# Patient Record
Sex: MALE | Race: WHITE | Employment: OTHER | URBAN - NONMETROPOLITAN AREA
[De-identification: names, ages, dates, MRNs, and addresses within clinical notes are randomized per-mention and may not be internally consistent; named-entity substitution may affect disease eponyms.]

---

## 2017-01-24 ENCOUNTER — HOSPITAL ENCOUNTER (OUTPATIENT)
Dept: PAIN MANAGEMENT | Age: 62
Discharge: HOME | End: 2017-01-24
Payer: MEDICARE

## 2017-01-24 VITALS
WEIGHT: 171 LBS | BODY MASS INDEX: 28.49 KG/M2 | RESPIRATION RATE: 16 BRPM | SYSTOLIC BLOOD PRESSURE: 141 MMHG | TEMPERATURE: 97.7 F | HEART RATE: 75 BPM | OXYGEN SATURATION: 94 % | DIASTOLIC BLOOD PRESSURE: 82 MMHG | HEIGHT: 65 IN

## 2017-01-24 DIAGNOSIS — M47.816 LUMBAR FACET ARTHROPATHY: ICD-10-CM

## 2017-01-24 PROCEDURE — 99214 OFFICE O/P EST MOD 30 MIN: CPT

## 2017-01-24 RX ORDER — OXYCODONE HYDROCHLORIDE 15 MG/1
1 TABLET, FILM COATED, EXTENDED RELEASE ORAL EVERY 12 HOURS
Qty: 60 TABLET | Refills: 0 | Status: SHIPPED | OUTPATIENT
Start: 2017-01-29 | End: 2017-02-20 | Stop reason: SDUPTHER

## 2017-01-24 RX ORDER — OXYCODONE HYDROCHLORIDE 10 MG/1
10 TABLET ORAL 4 TIMES DAILY PRN
Qty: 120 TABLET | Refills: 0 | Status: SHIPPED | OUTPATIENT
Start: 2017-01-29 | End: 2017-02-20 | Stop reason: SDUPTHER

## 2017-01-24 ASSESSMENT — PAIN DESCRIPTION - DESCRIPTORS: DESCRIPTORS: ACHING;CONSTANT

## 2017-01-24 ASSESSMENT — PAIN SCALES - GENERAL: PAINLEVEL_OUTOF10: 5

## 2017-01-24 ASSESSMENT — PAIN DESCRIPTION - PROGRESSION: CLINICAL_PROGRESSION: NOT CHANGED

## 2017-01-24 ASSESSMENT — PAIN DESCRIPTION - FREQUENCY: FREQUENCY: CONTINUOUS

## 2017-01-24 ASSESSMENT — PAIN DESCRIPTION - ORIENTATION: ORIENTATION: RIGHT;LEFT;LOWER

## 2017-01-24 ASSESSMENT — PAIN DESCRIPTION - PAIN TYPE: TYPE: CHRONIC PAIN

## 2017-01-24 ASSESSMENT — ACTIVITIES OF DAILY LIVING (ADL): EFFECT OF PAIN ON DAILY ACTIVITIES: DAILY ACTIVITIES

## 2017-01-24 ASSESSMENT — PAIN DESCRIPTION - ONSET: ONSET: ON-GOING

## 2017-02-21 RX ORDER — OXYCODONE HYDROCHLORIDE 15 MG/1
1 TABLET, FILM COATED, EXTENDED RELEASE ORAL EVERY 12 HOURS
Qty: 60 TABLET | Refills: 0 | Status: SHIPPED | OUTPATIENT
Start: 2017-02-28 | End: 2017-03-06 | Stop reason: SDUPTHER

## 2017-02-21 RX ORDER — OXYCODONE HYDROCHLORIDE 10 MG/1
10 TABLET ORAL 4 TIMES DAILY PRN
Qty: 120 TABLET | Refills: 0 | Status: SHIPPED | OUTPATIENT
Start: 2017-02-28 | End: 2017-03-06 | Stop reason: SDUPTHER

## 2017-03-06 ENCOUNTER — HOSPITAL ENCOUNTER (OUTPATIENT)
Dept: PAIN MANAGEMENT | Age: 62
Discharge: HOME | End: 2017-03-06
Payer: MEDICARE

## 2017-03-06 VITALS
BODY MASS INDEX: 28.16 KG/M2 | SYSTOLIC BLOOD PRESSURE: 122 MMHG | HEART RATE: 82 BPM | HEIGHT: 65 IN | RESPIRATION RATE: 20 BRPM | DIASTOLIC BLOOD PRESSURE: 76 MMHG | OXYGEN SATURATION: 98 % | WEIGHT: 169 LBS | TEMPERATURE: 97.5 F

## 2017-03-06 DIAGNOSIS — M79.18 MYOFASCIAL PAIN SYNDROME: Chronic | ICD-10-CM

## 2017-03-06 DIAGNOSIS — M47.816 LUMBAR FACET ARTHROPATHY: ICD-10-CM

## 2017-03-06 DIAGNOSIS — G89.29 CHRONIC BILATERAL LOW BACK PAIN WITH RIGHT-SIDED SCIATICA: ICD-10-CM

## 2017-03-06 DIAGNOSIS — M54.41 CHRONIC BILATERAL LOW BACK PAIN WITH RIGHT-SIDED SCIATICA: ICD-10-CM

## 2017-03-06 PROCEDURE — 6360000002 HC RX W HCPCS

## 2017-03-06 PROCEDURE — 20553 NJX 1/MLT TRIGGER POINTS 3/>: CPT

## 2017-03-06 PROCEDURE — 2500000003 HC RX 250 WO HCPCS

## 2017-03-06 RX ORDER — OXYCODONE HYDROCHLORIDE 10 MG/1
10 TABLET ORAL 4 TIMES DAILY PRN
Qty: 120 TABLET | Refills: 0 | Status: SHIPPED | OUTPATIENT
Start: 2017-03-30 | End: 2017-04-12 | Stop reason: SDUPTHER

## 2017-03-06 RX ORDER — OXYCODONE HYDROCHLORIDE 15 MG/1
1 TABLET, FILM COATED, EXTENDED RELEASE ORAL EVERY 12 HOURS
Qty: 60 TABLET | Refills: 0 | Status: SHIPPED | OUTPATIENT
Start: 2017-03-30 | End: 2017-04-12 | Stop reason: SDUPTHER

## 2017-03-06 RX ORDER — TRIAMCINOLONE ACETONIDE 40 MG/ML
INJECTION, SUSPENSION INTRA-ARTICULAR; INTRAMUSCULAR
Status: COMPLETED | OUTPATIENT
Start: 2017-03-06 | End: 2017-03-06

## 2017-03-06 RX ORDER — BUPIVACAINE HYDROCHLORIDE 5 MG/ML
INJECTION, SOLUTION EPIDURAL; INTRACAUDAL
Status: COMPLETED | OUTPATIENT
Start: 2017-03-06 | End: 2017-03-06

## 2017-03-06 RX ADMIN — TRIAMCINOLONE ACETONIDE 30 MG: 40 INJECTION, SUSPENSION INTRA-ARTICULAR; INTRAMUSCULAR at 11:36

## 2017-03-06 RX ADMIN — BUPIVACAINE HYDROCHLORIDE 14 ML: 5 INJECTION, SOLUTION EPIDURAL; INTRACAUDAL at 11:32

## 2017-03-06 ASSESSMENT — PAIN DESCRIPTION - DESCRIPTORS: DESCRIPTORS: ACHING;CONSTANT;RADIATING

## 2017-03-06 ASSESSMENT — PAIN DESCRIPTION - LOCATION: LOCATION: BACK;NECK

## 2017-03-06 ASSESSMENT — PAIN DESCRIPTION - ONSET: ONSET: ON-GOING

## 2017-03-06 ASSESSMENT — PAIN DESCRIPTION - FREQUENCY: FREQUENCY: CONTINUOUS

## 2017-03-06 ASSESSMENT — PAIN DESCRIPTION - PROGRESSION: CLINICAL_PROGRESSION: NOT CHANGED

## 2017-03-06 ASSESSMENT — PAIN DESCRIPTION - PAIN TYPE: TYPE: CHRONIC PAIN

## 2017-04-13 RX ORDER — OXYCODONE HYDROCHLORIDE 10 MG/1
10 TABLET ORAL 4 TIMES DAILY PRN
Qty: 120 TABLET | Refills: 0 | Status: SHIPPED | OUTPATIENT
Start: 2017-04-29 | End: 2017-05-02 | Stop reason: SDUPTHER

## 2017-04-13 RX ORDER — OXYCODONE HYDROCHLORIDE 15 MG/1
1 TABLET, FILM COATED, EXTENDED RELEASE ORAL EVERY 12 HOURS
Qty: 60 TABLET | Refills: 0 | Status: SHIPPED | OUTPATIENT
Start: 2017-04-29 | End: 2017-05-02 | Stop reason: SDUPTHER

## 2017-05-02 ENCOUNTER — HOSPITAL ENCOUNTER (OUTPATIENT)
Dept: PAIN MANAGEMENT | Age: 62
Discharge: HOME | End: 2017-05-02
Payer: MEDICARE

## 2017-05-02 VITALS
HEART RATE: 69 BPM | OXYGEN SATURATION: 95 % | DIASTOLIC BLOOD PRESSURE: 74 MMHG | SYSTOLIC BLOOD PRESSURE: 112 MMHG | RESPIRATION RATE: 20 BRPM

## 2017-05-02 DIAGNOSIS — M54.59 LUMBAR FACET JOINT PAIN: ICD-10-CM

## 2017-05-02 DIAGNOSIS — M79.18 MYOFASCIAL PAIN SYNDROME: ICD-10-CM

## 2017-05-02 DIAGNOSIS — M47.816 LUMBAR FACET ARTHROPATHY: ICD-10-CM

## 2017-05-02 PROCEDURE — 6360000002 HC RX W HCPCS

## 2017-05-02 PROCEDURE — 2500000003 HC RX 250 WO HCPCS

## 2017-05-02 PROCEDURE — 64494 INJ PARAVERT F JNT L/S 2 LEV: CPT

## 2017-05-02 PROCEDURE — 64493 INJ PARAVERT F JNT L/S 1 LEV: CPT

## 2017-05-02 PROCEDURE — 3209999900 FLUORO FOR SURGICAL PROCEDURES

## 2017-05-02 PROCEDURE — 99152 MOD SED SAME PHYS/QHP 5/>YRS: CPT

## 2017-05-02 RX ORDER — BUPIVACAINE HYDROCHLORIDE 5 MG/ML
INJECTION, SOLUTION EPIDURAL; INTRACAUDAL
Status: COMPLETED | OUTPATIENT
Start: 2017-05-02 | End: 2017-05-02

## 2017-05-02 RX ORDER — TRIAMCINOLONE ACETONIDE 40 MG/ML
INJECTION, SUSPENSION INTRA-ARTICULAR; INTRAMUSCULAR
Status: COMPLETED | OUTPATIENT
Start: 2017-05-02 | End: 2017-05-02

## 2017-05-02 RX ORDER — MIDAZOLAM HYDROCHLORIDE 1 MG/ML
INJECTION INTRAMUSCULAR; INTRAVENOUS
Status: COMPLETED | OUTPATIENT
Start: 2017-05-02 | End: 2017-05-02

## 2017-05-02 RX ORDER — OXYCODONE AND ACETAMINOPHEN 10; 325 MG/1; MG/1
1 TABLET ORAL EVERY 4 HOURS PRN
COMMUNITY
Start: 2017-05-29 | End: 2017-06-20 | Stop reason: ALTCHOICE

## 2017-05-02 RX ORDER — OXYCODONE AND ACETAMINOPHEN 10; 325 MG/1; MG/1
TABLET ORAL
Qty: 150 TABLET | Refills: 0 | Status: SHIPPED | OUTPATIENT
Start: 2017-05-02 | End: 2017-06-20 | Stop reason: ALTCHOICE

## 2017-05-02 RX ORDER — OXYCODONE AND ACETAMINOPHEN 10; 325 MG/1; MG/1
1 TABLET ORAL EVERY 4 HOURS PRN
Qty: 150 TABLET | Refills: 0 | Status: SHIPPED | OUTPATIENT
Start: 2017-05-29 | End: 2017-06-20 | Stop reason: SDUPTHER

## 2017-05-02 RX ADMIN — MIDAZOLAM HYDROCHLORIDE 2 MG: 1 INJECTION INTRAMUSCULAR; INTRAVENOUS at 11:44

## 2017-05-02 RX ADMIN — BUPIVACAINE HYDROCHLORIDE 0.5 ML: 5 INJECTION, SOLUTION EPIDURAL; INTRACAUDAL at 11:52

## 2017-05-02 RX ADMIN — TRIAMCINOLONE ACETONIDE 2 MG: 40 INJECTION, SUSPENSION INTRA-ARTICULAR; INTRAMUSCULAR at 11:53

## 2017-06-09 ENCOUNTER — TELEPHONE (OUTPATIENT)
Dept: GASTROENTEROLOGY | Facility: CLINIC | Age: 62
End: 2017-06-09

## 2017-06-09 NOTE — TELEPHONE ENCOUNTER
Sent copy of lab orders and reminder letter to patient for Hepatitis C RNA, Quantitative, PCR order.

## 2017-06-20 ENCOUNTER — HOSPITAL ENCOUNTER (OUTPATIENT)
Dept: PAIN MANAGEMENT | Age: 62
Discharge: HOME | End: 2017-06-20
Payer: MEDICARE

## 2017-06-20 ENCOUNTER — HOSPITAL ENCOUNTER (OUTPATIENT)
Dept: GENERAL RADIOLOGY | Age: 62
Discharge: HOME | End: 2017-06-20
Payer: MEDICARE

## 2017-06-20 VITALS
BODY MASS INDEX: 27.32 KG/M2 | OXYGEN SATURATION: 99 % | DIASTOLIC BLOOD PRESSURE: 88 MMHG | RESPIRATION RATE: 18 BRPM | SYSTOLIC BLOOD PRESSURE: 152 MMHG | TEMPERATURE: 97.2 F | HEART RATE: 78 BPM | WEIGHT: 164 LBS | HEIGHT: 65 IN

## 2017-06-20 DIAGNOSIS — M79.18 MYOFASCIAL PAIN SYNDROME: ICD-10-CM

## 2017-06-20 DIAGNOSIS — M54.41 CHRONIC BILATERAL LOW BACK PAIN WITH RIGHT-SIDED SCIATICA: ICD-10-CM

## 2017-06-20 DIAGNOSIS — G89.29 CHRONIC BILATERAL LOW BACK PAIN WITH RIGHT-SIDED SCIATICA: ICD-10-CM

## 2017-06-20 DIAGNOSIS — M47.816 LUMBAR FACET ARTHROPATHY: ICD-10-CM

## 2017-06-20 PROCEDURE — 99213 OFFICE O/P EST LOW 20 MIN: CPT

## 2017-06-20 PROCEDURE — 72120 X-RAY BEND ONLY L-S SPINE: CPT

## 2017-06-20 PROCEDURE — G0463 HOSPITAL OUTPT CLINIC VISIT: HCPCS

## 2017-06-20 RX ORDER — OXYCODONE AND ACETAMINOPHEN 10; 325 MG/1; MG/1
1 TABLET ORAL EVERY 4 HOURS PRN
Qty: 150 TABLET | Refills: 0 | Status: SHIPPED | OUTPATIENT
Start: 2017-06-28 | End: 2017-07-19 | Stop reason: SDUPTHER

## 2017-06-20 ASSESSMENT — PAIN DESCRIPTION - PROGRESSION: CLINICAL_PROGRESSION: NOT CHANGED

## 2017-06-20 ASSESSMENT — PAIN DESCRIPTION - DESCRIPTORS: DESCRIPTORS: ACHING;CONSTANT;RADIATING

## 2017-06-20 ASSESSMENT — PAIN DESCRIPTION - ORIENTATION: ORIENTATION: LOWER

## 2017-06-20 ASSESSMENT — ACTIVITIES OF DAILY LIVING (ADL): EFFECT OF PAIN ON DAILY ACTIVITIES: LIMITS ACTIVITIES

## 2017-06-20 ASSESSMENT — PAIN DESCRIPTION - FREQUENCY: FREQUENCY: CONTINUOUS

## 2017-06-20 ASSESSMENT — PAIN SCALES - GENERAL: PAINLEVEL_OUTOF10: 5

## 2017-06-20 ASSESSMENT — PAIN DESCRIPTION - PAIN TYPE: TYPE: CHRONIC PAIN

## 2017-06-20 ASSESSMENT — PAIN DESCRIPTION - LOCATION: LOCATION: BACK

## 2017-06-20 ASSESSMENT — PAIN DESCRIPTION - ONSET: ONSET: ON-GOING

## 2017-07-07 ENCOUNTER — TELEPHONE (OUTPATIENT)
Dept: GASTROENTEROLOGY | Facility: CLINIC | Age: 62
End: 2017-07-07

## 2017-07-07 NOTE — TELEPHONE ENCOUNTER
"----- Message from Emma Mittal MA sent at 7/7/2017  8:52 AM CDT -----  Regarding: Cancellation of Order # [id number]  Order number [id number] for the procedure HEPATITIS C RNA,   QUANTITATIVE, PCR (GRAPH) [[id number]] has been canceled by Emma Mittal MA [[id number]]. This procedure was ordered by you on Nov 21, 2016 for the patient Cayetano Curry [[id number]]. The reason for  cancellation was \"Other\".    This was a future order.  "

## 2017-07-19 RX ORDER — OXYCODONE AND ACETAMINOPHEN 10; 325 MG/1; MG/1
1 TABLET ORAL EVERY 4 HOURS PRN
Qty: 150 TABLET | Refills: 0 | Status: SHIPPED | OUTPATIENT
Start: 2017-07-28 | End: 2017-08-03 | Stop reason: SDUPTHER

## 2017-08-03 ENCOUNTER — HOSPITAL ENCOUNTER (OUTPATIENT)
Dept: PAIN MANAGEMENT | Age: 62
Discharge: HOME | End: 2017-08-03
Payer: MEDICARE

## 2017-08-03 VITALS
WEIGHT: 164 LBS | RESPIRATION RATE: 20 BRPM | HEART RATE: 70 BPM | HEIGHT: 65 IN | OXYGEN SATURATION: 99 % | TEMPERATURE: 97.5 F | BODY MASS INDEX: 27.32 KG/M2 | DIASTOLIC BLOOD PRESSURE: 68 MMHG | SYSTOLIC BLOOD PRESSURE: 124 MMHG

## 2017-08-03 DIAGNOSIS — M47.816 LUMBAR FACET ARTHROPATHY: ICD-10-CM

## 2017-08-03 DIAGNOSIS — M54.59 LUMBAR FACET JOINT PAIN: ICD-10-CM

## 2017-08-03 DIAGNOSIS — M79.18 MYOFASCIAL PAIN SYNDROME: ICD-10-CM

## 2017-08-03 PROCEDURE — 99152 MOD SED SAME PHYS/QHP 5/>YRS: CPT

## 2017-08-03 PROCEDURE — 64636 DESTROY L/S FACET JNT ADDL: CPT

## 2017-08-03 PROCEDURE — 2500000003 HC RX 250 WO HCPCS

## 2017-08-03 PROCEDURE — 64635 DESTROY LUMB/SAC FACET JNT: CPT

## 2017-08-03 PROCEDURE — 80307 DRUG TEST PRSMV CHEM ANLYZR: CPT

## 2017-08-03 PROCEDURE — 3209999900 FLUORO FOR SURGICAL PROCEDURES

## 2017-08-03 PROCEDURE — 6360000002 HC RX W HCPCS

## 2017-08-03 RX ORDER — ROPINIROLE 0.25 MG/1
0.25 TABLET, FILM COATED ORAL NIGHTLY
COMMUNITY

## 2017-08-03 RX ORDER — OXYCODONE AND ACETAMINOPHEN 10; 325 MG/1; MG/1
1 TABLET ORAL EVERY 4 HOURS PRN
Qty: 150 TABLET | Refills: 0 | Status: SHIPPED | OUTPATIENT
Start: 2017-08-26 | End: 2017-09-21 | Stop reason: SDUPTHER

## 2017-08-03 RX ORDER — LIDOCAINE HYDROCHLORIDE 10 MG/ML
INJECTION, SOLUTION EPIDURAL; INFILTRATION; INTRACAUDAL; PERINEURAL
Status: COMPLETED | OUTPATIENT
Start: 2017-08-03 | End: 2017-08-03

## 2017-08-03 RX ORDER — BUPIVACAINE HYDROCHLORIDE 5 MG/ML
INJECTION, SOLUTION EPIDURAL; INTRACAUDAL
Status: COMPLETED | OUTPATIENT
Start: 2017-08-03 | End: 2017-08-03

## 2017-08-03 RX ORDER — TRIAMCINOLONE ACETONIDE 40 MG/ML
INJECTION, SUSPENSION INTRA-ARTICULAR; INTRAMUSCULAR
Status: COMPLETED | OUTPATIENT
Start: 2017-08-03 | End: 2017-08-03

## 2017-08-03 RX ORDER — LIDOCAINE HYDROCHLORIDE 20 MG/ML
INJECTION, SOLUTION EPIDURAL; INFILTRATION; INTRACAUDAL; PERINEURAL
Status: COMPLETED | OUTPATIENT
Start: 2017-08-03 | End: 2017-08-03

## 2017-08-03 RX ORDER — MIDAZOLAM HYDROCHLORIDE 1 MG/ML
INJECTION INTRAMUSCULAR; INTRAVENOUS
Status: COMPLETED | OUTPATIENT
Start: 2017-08-03 | End: 2017-08-03

## 2017-08-03 RX ADMIN — MIDAZOLAM HYDROCHLORIDE 2 MG: 1 INJECTION INTRAMUSCULAR; INTRAVENOUS at 09:53

## 2017-08-03 RX ADMIN — LIDOCAINE HYDROCHLORIDE 2 ML: 20 INJECTION, SOLUTION EPIDURAL; INFILTRATION; INTRACAUDAL; PERINEURAL at 10:09

## 2017-08-03 RX ADMIN — LIDOCAINE HYDROCHLORIDE 3 ML: 10 INJECTION, SOLUTION EPIDURAL; INFILTRATION; INTRACAUDAL; PERINEURAL at 09:58

## 2017-08-03 RX ADMIN — TRIAMCINOLONE ACETONIDE 8 MG: 40 INJECTION, SUSPENSION INTRA-ARTICULAR; INTRAMUSCULAR at 10:10

## 2017-08-03 RX ADMIN — BUPIVACAINE HYDROCHLORIDE 1 ML: 5 INJECTION, SOLUTION EPIDURAL; INTRACAUDAL at 10:09

## 2017-08-03 ASSESSMENT — PAIN DESCRIPTION - DESCRIPTORS: DESCRIPTORS: ACHING;CONSTANT;THROBBING;RADIATING;NUMBNESS

## 2017-08-03 ASSESSMENT — PAIN - FUNCTIONAL ASSESSMENT
PAIN_FUNCTIONAL_ASSESSMENT: 0-10
PAIN_FUNCTIONAL_ASSESSMENT: 0-10

## 2017-08-03 ASSESSMENT — ACTIVITIES OF DAILY LIVING (ADL): EFFECT OF PAIN ON DAILY ACTIVITIES: DAILY CHORES AND ACTIVITIES

## 2017-08-07 LAB
AMPHETAMINES, URINE: NEGATIVE NG/ML
BARBITURATES, URINE: NEGATIVE NG/ML
BENZODIAZEPINES, URINE: NEGATIVE NG/ML
CANNABINOIDS, URINE: NEGATIVE NG/ML
COCAINE METABOLITE, URINE: NEGATIVE NG/ML
CREATININE, URINE: 21.8 MG/DL (ref 20–300)
ETHANOL U, QUAN: NEGATIVE %
FENTANYL URINE: NEGATIVE PG/ML
MEPERIDINE, UR: NEGATIVE NG/ML
METHADONE SCREEN, URINE: NEGATIVE NG/ML
OPIATES, URINE: NEGATIVE NG/ML
OXYCODONE URINE: POSITIVE
OXYCODONE, UR GC/MS: 585 NG/ML
OXYCODONE/OXYMORPH, UR: POSITIVE
OXYCODONE/OXYMORPHONE, UR: ABNORMAL NG/ML
OXYMORPHONE, URINE: NEGATIVE
PH, URINE: 6.5 (ref 4.5–8.9)
PHENCYCLIDINE, URINE: NEGATIVE NG/ML
PROPOXYPHENE, URINE: NEGATIVE NG/ML

## 2017-09-22 RX ORDER — OXYCODONE AND ACETAMINOPHEN 10; 325 MG/1; MG/1
1 TABLET ORAL 4 TIMES DAILY PRN
Qty: 120 TABLET | Refills: 0 | Status: SHIPPED | OUTPATIENT
Start: 2017-09-25 | End: 2017-10-23 | Stop reason: SDUPTHER

## 2017-10-24 RX ORDER — OXYCODONE AND ACETAMINOPHEN 10; 325 MG/1; MG/1
1 TABLET ORAL 4 TIMES DAILY PRN
Qty: 120 TABLET | Refills: 0 | Status: SHIPPED | OUTPATIENT
Start: 2017-10-25 | End: 2017-11-03 | Stop reason: SDUPTHER

## 2017-11-03 ENCOUNTER — HOSPITAL ENCOUNTER (OUTPATIENT)
Dept: PAIN MANAGEMENT | Age: 62
Discharge: HOME | End: 2017-11-03
Payer: MEDICARE

## 2017-11-03 ENCOUNTER — HOSPITAL ENCOUNTER (OUTPATIENT)
Dept: GENERAL RADIOLOGY | Age: 62
Discharge: HOME | End: 2017-11-03
Payer: MEDICARE

## 2017-11-03 VITALS
WEIGHT: 157 LBS | BODY MASS INDEX: 26.16 KG/M2 | SYSTOLIC BLOOD PRESSURE: 120 MMHG | HEART RATE: 68 BPM | TEMPERATURE: 97.5 F | RESPIRATION RATE: 18 BRPM | DIASTOLIC BLOOD PRESSURE: 80 MMHG | OXYGEN SATURATION: 97 % | HEIGHT: 65 IN

## 2017-11-03 DIAGNOSIS — M25.552 PAIN OF LEFT HIP JOINT: ICD-10-CM

## 2017-11-03 DIAGNOSIS — M47.816 LUMBAR FACET ARTHROPATHY: ICD-10-CM

## 2017-11-03 DIAGNOSIS — M79.18 MYOFASCIAL PAIN SYNDROME: ICD-10-CM

## 2017-11-03 DIAGNOSIS — M25.552 PAIN OF LEFT HIP JOINT: Primary | ICD-10-CM

## 2017-11-03 DIAGNOSIS — G89.29 CHRONIC BILATERAL LOW BACK PAIN WITH RIGHT-SIDED SCIATICA: ICD-10-CM

## 2017-11-03 DIAGNOSIS — M54.41 CHRONIC BILATERAL LOW BACK PAIN WITH RIGHT-SIDED SCIATICA: ICD-10-CM

## 2017-11-03 PROCEDURE — 99213 OFFICE O/P EST LOW 20 MIN: CPT

## 2017-11-03 PROCEDURE — 73502 X-RAY EXAM HIP UNI 2-3 VIEWS: CPT

## 2017-11-03 RX ORDER — OXYCODONE AND ACETAMINOPHEN 10; 325 MG/1; MG/1
1 TABLET ORAL 4 TIMES DAILY PRN
Qty: 120 TABLET | Refills: 0 | Status: SHIPPED | OUTPATIENT
Start: 2017-11-24 | End: 2017-12-12 | Stop reason: SDUPTHER

## 2017-11-03 ASSESSMENT — PAIN SCALES - GENERAL: PAINLEVEL_OUTOF10: 6

## 2017-11-03 ASSESSMENT — PAIN DESCRIPTION - PROGRESSION: CLINICAL_PROGRESSION: NOT CHANGED

## 2017-11-03 ASSESSMENT — PAIN DESCRIPTION - PAIN TYPE: TYPE: CHRONIC PAIN

## 2017-11-03 ASSESSMENT — PAIN DESCRIPTION - DIRECTION: RADIATING_TOWARDS: INTO BILATERAL HIPS

## 2017-11-03 ASSESSMENT — ACTIVITIES OF DAILY LIVING (ADL): EFFECT OF PAIN ON DAILY ACTIVITIES: LIMITS ACTIVITY

## 2017-11-03 ASSESSMENT — PAIN DESCRIPTION - ONSET: ONSET: ON-GOING

## 2017-11-03 ASSESSMENT — PAIN DESCRIPTION - ORIENTATION: ORIENTATION: LOWER

## 2017-11-03 ASSESSMENT — PAIN DESCRIPTION - FREQUENCY: FREQUENCY: CONTINUOUS

## 2017-11-03 ASSESSMENT — PAIN DESCRIPTION - LOCATION: LOCATION: BACK

## 2017-11-03 ASSESSMENT — PAIN DESCRIPTION - DESCRIPTORS: DESCRIPTORS: ACHING;CONSTANT;RADIATING;THROBBING;NUMBNESS

## 2017-11-03 NOTE — PROGRESS NOTES
Marisela Yates/Franc  Patient Pain Assessment  Progress Note       Chief Complaint   Patient presents with     Left Hip Pain     Neck/Upper Back Pain    Lower Back Pain     Pain Assessment  Pain Assessment: 0-10  Pain Level: 6  Pain Type: Chronic pain  Pain Location: Back, Neck, Hip  Pain Orientation: Lower, Upper, Left  Pain Descriptors: Aching, Constant, Radiating, Throbbing, Numbness  Pain Frequency: Continuous  Pain Onset: On-going  Clinical Progression: Not changed  Effect of Pain on Daily Activities: limits activity       [x]  Issues of Concern:    Reports he does not want to r/s another RFL currently, says it only lasted 2 weeks. Discussed other options related to current pain. Patient reports he has gotten good pain relief with previous trigger points to upper and lower back, neck region. Reporting pain in left hip and thinks it relates to limping due to limited ROM of left knee since prosthesis placement, reports he will be seeing Dr. Hodan Anders regarding his knee soon. Discussed injection for left hip pain as well and he is agreeable to this plan.       [x]  Reports current medication is helping, but continues to have on-going pain    [x]  Reports current pain medication increases ability to do activities of daily living     [x]  Current narcotic medications    [x]  Discussed possible medication side effects, risk of tolerance and/or dependence, alternative treatments    [x]  Encouraged to set goals of decreasing daily narcotic intake    [x]  Discussed effects of long term narcotic use      [x]  Injection options discussed (see above note)     []Yes [x]No  Current medication side effects, comment if applicable:      []Yes [x]No   Acute bladder or bowel changes    Previous Procedure / Percentage of pain control / Imaging / PT History:  Percentage of Pain Relief after Last Procedure: 40 %  How long lasted: 2 weeks     Radiology exams received during the last 12 months: XR Lumbar Spine  When 6/2017

## 2017-12-13 RX ORDER — OXYCODONE AND ACETAMINOPHEN 10; 325 MG/1; MG/1
1 TABLET ORAL 4 TIMES DAILY PRN
Qty: 120 TABLET | Refills: 0 | Status: SHIPPED | OUTPATIENT
Start: 2017-12-24 | End: 2018-01-17 | Stop reason: SDUPTHER

## 2018-01-18 RX ORDER — OXYCODONE AND ACETAMINOPHEN 10; 325 MG/1; MG/1
1 TABLET ORAL 4 TIMES DAILY PRN
Qty: 120 TABLET | Refills: 0 | Status: SHIPPED | OUTPATIENT
Start: 2018-01-23 | End: 2018-02-02 | Stop reason: SDUPTHER

## 2018-02-02 ENCOUNTER — HOSPITAL ENCOUNTER (OUTPATIENT)
Dept: PAIN MANAGEMENT | Age: 63
Discharge: HOME | End: 2018-02-02
Payer: MEDICARE

## 2018-02-02 VITALS
HEIGHT: 66 IN | OXYGEN SATURATION: 100 % | DIASTOLIC BLOOD PRESSURE: 61 MMHG | WEIGHT: 162 LBS | SYSTOLIC BLOOD PRESSURE: 126 MMHG | TEMPERATURE: 97.9 F | HEART RATE: 65 BPM | BODY MASS INDEX: 26.03 KG/M2 | RESPIRATION RATE: 20 BRPM

## 2018-02-02 DIAGNOSIS — M25.552 LEFT HIP PAIN: ICD-10-CM

## 2018-02-02 DIAGNOSIS — M47.816 LUMBAR FACET ARTHROPATHY: ICD-10-CM

## 2018-02-02 DIAGNOSIS — M79.18 MYOFASCIAL PAIN SYNDROME: ICD-10-CM

## 2018-02-02 DIAGNOSIS — G89.29 CHRONIC BILATERAL LOW BACK PAIN WITH RIGHT-SIDED SCIATICA: ICD-10-CM

## 2018-02-02 DIAGNOSIS — M79.18 MYOFASCIAL PAIN: Chronic | ICD-10-CM

## 2018-02-02 DIAGNOSIS — M54.41 CHRONIC BILATERAL LOW BACK PAIN WITH RIGHT-SIDED SCIATICA: ICD-10-CM

## 2018-02-02 PROCEDURE — 20553 NJX 1/MLT TRIGGER POINTS 3/>: CPT

## 2018-02-02 PROCEDURE — 20610 DRAIN/INJ JOINT/BURSA W/O US: CPT

## 2018-02-02 PROCEDURE — 3209999900 FLUORO FOR SURGICAL PROCEDURES

## 2018-02-02 PROCEDURE — 6360000002 HC RX W HCPCS

## 2018-02-02 PROCEDURE — 2500000003 HC RX 250 WO HCPCS

## 2018-02-02 RX ORDER — TRIAMCINOLONE ACETONIDE 40 MG/ML
INJECTION, SUSPENSION INTRA-ARTICULAR; INTRAMUSCULAR
Status: COMPLETED | OUTPATIENT
Start: 2018-02-02 | End: 2018-02-02

## 2018-02-02 RX ORDER — OXYCODONE AND ACETAMINOPHEN 10; 325 MG/1; MG/1
1 TABLET ORAL 4 TIMES DAILY PRN
Qty: 120 TABLET | Refills: 0 | Status: SHIPPED | OUTPATIENT
Start: 2018-02-22 | End: 2018-03-16 | Stop reason: SDUPTHER

## 2018-02-02 RX ORDER — BUPIVACAINE HYDROCHLORIDE 5 MG/ML
INJECTION, SOLUTION EPIDURAL; INTRACAUDAL
Status: COMPLETED | OUTPATIENT
Start: 2018-02-02 | End: 2018-02-02

## 2018-02-02 RX ADMIN — TRIAMCINOLONE ACETONIDE 30 MG: 40 INJECTION, SUSPENSION INTRA-ARTICULAR; INTRAMUSCULAR at 11:21

## 2018-02-02 RX ADMIN — BUPIVACAINE HYDROCHLORIDE 9 ML: 5 INJECTION, SOLUTION EPIDURAL; INTRACAUDAL at 11:16

## 2018-02-02 RX ADMIN — BUPIVACAINE HYDROCHLORIDE 15 ML: 5 INJECTION, SOLUTION EPIDURAL; INTRACAUDAL at 11:21

## 2018-02-02 RX ADMIN — TRIAMCINOLONE ACETONIDE 40 MG: 40 INJECTION, SUSPENSION INTRA-ARTICULAR; INTRAMUSCULAR at 11:16

## 2018-02-02 ASSESSMENT — PAIN DESCRIPTION - DESCRIPTORS: DESCRIPTORS: ACHING;CONSTANT;RADIATING;THROBBING

## 2018-02-02 ASSESSMENT — PAIN - FUNCTIONAL ASSESSMENT: PAIN_FUNCTIONAL_ASSESSMENT: 0-10

## 2018-02-02 ASSESSMENT — ACTIVITIES OF DAILY LIVING (ADL): EFFECT OF PAIN ON DAILY ACTIVITIES: LIMITS ACTIVITY

## 2018-02-02 NOTE — PROCEDURES
[]                   [] Over 50% of today's appointment was given to discussion, evaluation and counseling. [] Over 50%of today's appointment was given to discussion, evaluation and counseling.

## 2018-02-02 NOTE — PROGRESS NOTES
the last 12 months: Yes    Education Provided:  [x] Review of [employer]  [x] Agreement Review  [x] Compliance Issues Discussed    [] Cognitive Behavior Needs [x] Exercise [] Review of Test [] Financial Issues  [] Tobacco/Alcohol Use [x] Teaching [] New Patient [] Picture Obtained    Physician Plan:  [] Outgoing Referral  [] Pharmacy Consult  [] Test Ordered   [] Obtained Test Results / Consult Notes  [] UDS due at next visit, verified per EPIC      [] Suspected Physical Abuse or Suicide Risk assessed - IF YES COMPLETE QUESTIONS BELOW    If any of the following questions are answered yes - contact attending physician for referral:    Has been considering harming self to escape stress, pain problems? [] YES  [x] NO  Has a suicide plan? [] YES  [x] NO  Has attempted suicide in the past?   [] YES  [x] NO  Has a close friend or family member who committed suicide? [] YES  [x] NO    Patient Referred To :      Additional Notes:    Assessment Completed by:  Electronically signed by Enoch Parada RN on 2/2/2018 at 10:39 AM

## 2018-03-19 RX ORDER — OXYCODONE AND ACETAMINOPHEN 10; 325 MG/1; MG/1
1 TABLET ORAL 4 TIMES DAILY PRN
Qty: 120 TABLET | Refills: 0 | Status: SHIPPED | OUTPATIENT
Start: 2018-03-24 | End: 2018-04-17 | Stop reason: SDUPTHER

## 2018-04-05 ENCOUNTER — HOSPITAL ENCOUNTER (OUTPATIENT)
Dept: GENERAL RADIOLOGY | Age: 63
Discharge: HOME | End: 2018-04-05
Payer: MEDICARE

## 2018-04-05 ENCOUNTER — HOSPITAL ENCOUNTER (OUTPATIENT)
Dept: PREADMISSION TESTING | Age: 63
Discharge: HOME | End: 2018-04-09
Payer: MEDICARE

## 2018-04-05 VITALS — HEIGHT: 65 IN | BODY MASS INDEX: 27.82 KG/M2 | WEIGHT: 167 LBS

## 2018-04-05 LAB
ALBUMIN SERPL-MCNC: 4.5 G/DL (ref 3.5–5.2)
ALP BLD-CCNC: 84 U/L (ref 40–130)
ALT SERPL-CCNC: 21 U/L (ref 5–41)
ANION GAP SERPL CALCULATED.3IONS-SCNC: 14 MMOL/L (ref 7–19)
APTT: 31.8 SEC (ref 26–36.2)
AST SERPL-CCNC: 29 U/L (ref 5–40)
BASOPHILS ABSOLUTE: 0.1 K/UL (ref 0–0.2)
BASOPHILS RELATIVE PERCENT: 1 % (ref 0–1)
BILIRUB SERPL-MCNC: 0.5 MG/DL (ref 0.2–1.2)
BILIRUBIN URINE: NEGATIVE
BLOOD, URINE: NEGATIVE
BUN BLDV-MCNC: 11 MG/DL (ref 8–23)
C-REACTIVE PROTEIN: 0.24 MG/DL (ref 0–0.5)
CALCIUM SERPL-MCNC: 9.7 MG/DL (ref 8.8–10.2)
CHLORIDE BLD-SCNC: 101 MMOL/L (ref 98–111)
CLARITY: CLEAR
CO2: 28 MMOL/L (ref 22–29)
COLOR: YELLOW
CREAT SERPL-MCNC: 1 MG/DL (ref 0.5–1.2)
EOSINOPHILS ABSOLUTE: 0.4 K/UL (ref 0–0.6)
EOSINOPHILS RELATIVE PERCENT: 6 % (ref 0–5)
GFR NON-AFRICAN AMERICAN: >60
GLUCOSE BLD-MCNC: 98 MG/DL (ref 74–109)
GLUCOSE URINE: NEGATIVE MG/DL
HCT VFR BLD CALC: 46.4 % (ref 42–52)
HEMOGLOBIN: 15.2 G/DL (ref 14–18)
INR BLD: 0.97 (ref 0.88–1.18)
KETONES, URINE: NEGATIVE MG/DL
LEUKOCYTE ESTERASE, URINE: NEGATIVE
LYMPHOCYTES ABSOLUTE: 2.5 K/UL (ref 1.1–4.5)
LYMPHOCYTES RELATIVE PERCENT: 34.2 % (ref 20–40)
MCH RBC QN AUTO: 30.3 PG (ref 27–31)
MCHC RBC AUTO-ENTMCNC: 32.8 G/DL (ref 33–37)
MCV RBC AUTO: 92.4 FL (ref 80–94)
MONOCYTES ABSOLUTE: 0.7 K/UL (ref 0–0.9)
MONOCYTES RELATIVE PERCENT: 9.5 % (ref 0–10)
NEUTROPHILS ABSOLUTE: 3.6 K/UL (ref 1.5–7.5)
NEUTROPHILS RELATIVE PERCENT: 49 % (ref 50–65)
NITRITE, URINE: NEGATIVE
PDW BLD-RTO: 13.2 % (ref 11.5–14.5)
PH UA: 7
PLATELET # BLD: 260 K/UL (ref 130–400)
PMV BLD AUTO: 9.9 FL (ref 9.4–12.4)
POTASSIUM SERPL-SCNC: 4.4 MMOL/L (ref 3.5–5)
PROTEIN UA: NEGATIVE MG/DL
PROTHROMBIN TIME: 12.8 SEC (ref 12–14.6)
RBC # BLD: 5.02 M/UL (ref 4.7–6.1)
SEDIMENTATION RATE, ERYTHROCYTE: 2 MM/HR (ref 0–15)
SODIUM BLD-SCNC: 143 MMOL/L (ref 136–145)
SPECIFIC GRAVITY UA: 1.02
TOTAL PROTEIN: 7.1 G/DL (ref 6.6–8.7)
URINE REFLEX TO CULTURE: NORMAL
UROBILINOGEN, URINE: 1 E.U./DL
WBC # BLD: 7.3 K/UL (ref 4.8–10.8)

## 2018-04-05 PROCEDURE — 93005 ELECTROCARDIOGRAM TRACING: CPT

## 2018-04-05 PROCEDURE — 71046 X-RAY EXAM CHEST 2 VIEWS: CPT

## 2018-04-05 PROCEDURE — 85730 THROMBOPLASTIN TIME PARTIAL: CPT

## 2018-04-05 PROCEDURE — 85025 COMPLETE CBC W/AUTO DIFF WBC: CPT

## 2018-04-05 PROCEDURE — 85610 PROTHROMBIN TIME: CPT

## 2018-04-05 PROCEDURE — 81003 URINALYSIS AUTO W/O SCOPE: CPT

## 2018-04-05 PROCEDURE — 85652 RBC SED RATE AUTOMATED: CPT

## 2018-04-05 PROCEDURE — 86140 C-REACTIVE PROTEIN: CPT

## 2018-04-05 PROCEDURE — 87070 CULTURE OTHR SPECIMN AEROBIC: CPT

## 2018-04-05 PROCEDURE — 80053 COMPREHEN METABOLIC PANEL: CPT

## 2018-04-05 RX ORDER — ACETAMINOPHEN 500 MG
1000 TABLET ORAL ONCE
Status: CANCELLED | OUTPATIENT
Start: 2018-04-18

## 2018-04-05 RX ORDER — VANCOMYCIN HYDROCHLORIDE 1 G/200ML
1000 INJECTION, SOLUTION INTRAVENOUS ONCE
Status: CANCELLED | OUTPATIENT
Start: 2018-04-18

## 2018-04-05 RX ORDER — OXYCODONE HCL 10 MG/1
10 TABLET, FILM COATED, EXTENDED RELEASE ORAL ONCE
Status: CANCELLED | OUTPATIENT
Start: 2018-04-18

## 2018-04-05 RX ORDER — CELECOXIB 200 MG/1
200 CAPSULE ORAL ONCE
Status: CANCELLED | OUTPATIENT
Start: 2018-04-18

## 2018-04-05 RX ORDER — DEXAMETHASONE SODIUM PHOSPHATE 10 MG/ML
10 INJECTION, SOLUTION INTRAMUSCULAR; INTRAVENOUS ONCE
Status: CANCELLED | OUTPATIENT
Start: 2018-04-18

## 2018-04-05 RX ORDER — PREGABALIN 75 MG/1
75 CAPSULE ORAL ONCE
Status: CANCELLED | OUTPATIENT
Start: 2018-04-18

## 2018-04-06 LAB
EKG P AXIS: 78 DEGREES
EKG P-R INTERVAL: 148 MS
EKG Q-T INTERVAL: 394 MS
EKG QRS DURATION: 78 MS
EKG QTC CALCULATION (BAZETT): 408 MS
EKG T AXIS: 67 DEGREES
MRSA CULTURE ONLY: NORMAL

## 2018-04-12 ENCOUNTER — HOSPITAL ENCOUNTER (OUTPATIENT)
Dept: PAIN MANAGEMENT | Age: 63
Discharge: HOME | End: 2018-04-12
Payer: MEDICARE

## 2018-04-12 VITALS
SYSTOLIC BLOOD PRESSURE: 128 MMHG | BODY MASS INDEX: 26.16 KG/M2 | TEMPERATURE: 98 F | DIASTOLIC BLOOD PRESSURE: 79 MMHG | WEIGHT: 157 LBS | HEIGHT: 65 IN | OXYGEN SATURATION: 97 % | RESPIRATION RATE: 18 BRPM | HEART RATE: 69 BPM

## 2018-04-12 DIAGNOSIS — M54.41 CHRONIC BILATERAL LOW BACK PAIN WITH RIGHT-SIDED SCIATICA: ICD-10-CM

## 2018-04-12 DIAGNOSIS — M79.18 MYOFASCIAL PAIN: ICD-10-CM

## 2018-04-12 DIAGNOSIS — M47.816 LUMBAR FACET ARTHROPATHY: ICD-10-CM

## 2018-04-12 DIAGNOSIS — M79.18 MYOFASCIAL PAIN SYNDROME: ICD-10-CM

## 2018-04-12 DIAGNOSIS — G89.29 CHRONIC BILATERAL LOW BACK PAIN WITH RIGHT-SIDED SCIATICA: ICD-10-CM

## 2018-04-12 PROCEDURE — 99213 OFFICE O/P EST LOW 20 MIN: CPT

## 2018-04-12 RX ORDER — OXYCODONE AND ACETAMINOPHEN 10; 325 MG/1; MG/1
1 TABLET ORAL 4 TIMES DAILY PRN
Qty: 60 TABLET | Refills: 0 | Status: SHIPPED | OUTPATIENT
Start: 2018-04-18 | End: 2018-04-25

## 2018-04-12 ASSESSMENT — PAIN DESCRIPTION - ORIENTATION: ORIENTATION: LEFT

## 2018-04-12 ASSESSMENT — PAIN DESCRIPTION - PROGRESSION: CLINICAL_PROGRESSION: NOT CHANGED

## 2018-04-12 ASSESSMENT — PAIN DESCRIPTION - DESCRIPTORS: DESCRIPTORS: ACHING;CONSTANT;RADIATING;THROBBING

## 2018-04-12 ASSESSMENT — PAIN DESCRIPTION - DIRECTION: RADIATING_TOWARDS: LEFT HIP

## 2018-04-12 ASSESSMENT — PAIN DESCRIPTION - LOCATION: LOCATION: BACK;NECK;HIP

## 2018-04-12 ASSESSMENT — PAIN SCALES - GENERAL: PAINLEVEL_OUTOF10: 6

## 2018-04-12 ASSESSMENT — PAIN DESCRIPTION - PAIN TYPE: TYPE: CHRONIC PAIN

## 2018-04-12 ASSESSMENT — PAIN DESCRIPTION - ONSET: ONSET: ON-GOING

## 2018-04-12 ASSESSMENT — ACTIVITIES OF DAILY LIVING (ADL): EFFECT OF PAIN ON DAILY ACTIVITIES: LIMITS ACTIVITY

## 2018-04-12 ASSESSMENT — PAIN DESCRIPTION - FREQUENCY: FREQUENCY: CONTINUOUS

## 2018-04-18 ENCOUNTER — ANESTHESIA EVENT (OUTPATIENT)
Dept: OPERATING ROOM | Age: 63
DRG: 467 | End: 2018-04-18
Payer: MEDICARE

## 2018-04-18 ENCOUNTER — HOSPITAL ENCOUNTER (INPATIENT)
Age: 63
LOS: 5 days | Discharge: HOME HEALTH | DRG: 467 | End: 2018-04-23
Attending: ORTHOPAEDIC SURGERY | Admitting: ORTHOPAEDIC SURGERY
Payer: MEDICARE

## 2018-04-18 ENCOUNTER — APPOINTMENT (OUTPATIENT)
Dept: GENERAL RADIOLOGY | Age: 63
DRG: 467 | End: 2018-04-18
Attending: ORTHOPAEDIC SURGERY
Payer: MEDICARE

## 2018-04-18 ENCOUNTER — ANESTHESIA (OUTPATIENT)
Dept: OPERATING ROOM | Age: 63
DRG: 467 | End: 2018-04-18
Payer: MEDICARE

## 2018-04-18 VITALS
DIASTOLIC BLOOD PRESSURE: 67 MMHG | TEMPERATURE: 98.4 F | RESPIRATION RATE: 28 BRPM | SYSTOLIC BLOOD PRESSURE: 132 MMHG | OXYGEN SATURATION: 100 %

## 2018-04-18 PROBLEM — M76.892: Status: ACTIVE | Noted: 2018-04-18

## 2018-04-18 LAB
ABO/RH: NORMAL
ANTIBODY SCREEN: NORMAL

## 2018-04-18 PROCEDURE — 2580000003 HC RX 258: Performed by: ORTHOPAEDIC SURGERY

## 2018-04-18 PROCEDURE — 36415 COLL VENOUS BLD VENIPUNCTURE: CPT

## 2018-04-18 PROCEDURE — 6370000000 HC RX 637 (ALT 250 FOR IP): Performed by: ORTHOPAEDIC SURGERY

## 2018-04-18 PROCEDURE — 2500000003 HC RX 250 WO HCPCS: Performed by: NURSE ANESTHETIST, CERTIFIED REGISTERED

## 2018-04-18 PROCEDURE — 2580000003 HC RX 258: Performed by: NURSE ANESTHETIST, CERTIFIED REGISTERED

## 2018-04-18 PROCEDURE — 86901 BLOOD TYPING SEROLOGIC RH(D): CPT

## 2018-04-18 PROCEDURE — 7100000000 HC PACU RECOVERY - FIRST 15 MIN: Performed by: ORTHOPAEDIC SURGERY

## 2018-04-18 PROCEDURE — 87205 SMEAR GRAM STAIN: CPT

## 2018-04-18 PROCEDURE — 0SPD0JZ REMOVAL OF SYNTHETIC SUBSTITUTE FROM LEFT KNEE JOINT, OPEN APPROACH: ICD-10-PCS | Performed by: ORTHOPAEDIC SURGERY

## 2018-04-18 PROCEDURE — 87070 CULTURE OTHR SPECIMN AEROBIC: CPT

## 2018-04-18 PROCEDURE — 6360000002 HC RX W HCPCS: Performed by: NURSE ANESTHETIST, CERTIFIED REGISTERED

## 2018-04-18 PROCEDURE — 87176 TISSUE HOMOGENIZATION CULTR: CPT

## 2018-04-18 PROCEDURE — 6360000002 HC RX W HCPCS: Performed by: ORTHOPAEDIC SURGERY

## 2018-04-18 PROCEDURE — 3600000015 HC SURGERY LEVEL 5 ADDTL 15MIN: Performed by: ORTHOPAEDIC SURGERY

## 2018-04-18 PROCEDURE — 3700000001 HC ADD 15 MINUTES (ANESTHESIA): Performed by: ORTHOPAEDIC SURGERY

## 2018-04-18 PROCEDURE — 2720000000 HC MISC SURG SUPPLY STERILE $0-50: Performed by: ORTHOPAEDIC SURGERY

## 2018-04-18 PROCEDURE — 6360000002 HC RX W HCPCS: Performed by: ANESTHESIOLOGY

## 2018-04-18 PROCEDURE — C9290 INJ, BUPIVACAINE LIPOSOME: HCPCS | Performed by: ORTHOPAEDIC SURGERY

## 2018-04-18 PROCEDURE — 0SRD0J9 REPLACEMENT OF LEFT KNEE JOINT WITH SYNTHETIC SUBSTITUTE, CEMENTED, OPEN APPROACH: ICD-10-PCS | Performed by: ORTHOPAEDIC SURGERY

## 2018-04-18 PROCEDURE — 2780000010 HC IMPLANT OTHER: Performed by: ORTHOPAEDIC SURGERY

## 2018-04-18 PROCEDURE — 64447 NJX AA&/STRD FEMORAL NRV IMG: CPT | Performed by: NURSE ANESTHETIST, CERTIFIED REGISTERED

## 2018-04-18 PROCEDURE — 3209999900 FLUORO FOR SURGICAL PROCEDURES

## 2018-04-18 PROCEDURE — 86900 BLOOD TYPING SEROLOGIC ABO: CPT

## 2018-04-18 PROCEDURE — 1210000000 HC MED SURG R&B

## 2018-04-18 PROCEDURE — 86850 RBC ANTIBODY SCREEN: CPT

## 2018-04-18 PROCEDURE — 2580000003 HC RX 258: Performed by: ANESTHESIOLOGY

## 2018-04-18 PROCEDURE — C1776 JOINT DEVICE (IMPLANTABLE): HCPCS | Performed by: ORTHOPAEDIC SURGERY

## 2018-04-18 PROCEDURE — C1713 ANCHOR/SCREW BN/BN,TIS/BN: HCPCS | Performed by: ORTHOPAEDIC SURGERY

## 2018-04-18 PROCEDURE — 2720000001 HC MISC SURG SUPPLY STERILE $51-500: Performed by: ORTHOPAEDIC SURGERY

## 2018-04-18 PROCEDURE — 73560 X-RAY EXAM OF KNEE 1 OR 2: CPT

## 2018-04-18 PROCEDURE — 94664 DEMO&/EVAL PT USE INHALER: CPT

## 2018-04-18 PROCEDURE — 2500000003 HC RX 250 WO HCPCS: Performed by: ORTHOPAEDIC SURGERY

## 2018-04-18 PROCEDURE — 3600000005 HC SURGERY LEVEL 5 BASE: Performed by: ORTHOPAEDIC SURGERY

## 2018-04-18 PROCEDURE — 0T9B70Z DRAINAGE OF BLADDER WITH DRAINAGE DEVICE, VIA NATURAL OR ARTIFICIAL OPENING: ICD-10-PCS | Performed by: ORTHOPAEDIC SURGERY

## 2018-04-18 PROCEDURE — 3700000000 HC ANESTHESIA ATTENDED CARE: Performed by: ORTHOPAEDIC SURGERY

## 2018-04-18 PROCEDURE — 7100000001 HC PACU RECOVERY - ADDTL 15 MIN: Performed by: ORTHOPAEDIC SURGERY

## 2018-04-18 PROCEDURE — 2720000010 HC SURG SUPPLY STERILE: Performed by: ORTHOPAEDIC SURGERY

## 2018-04-18 DEVICE — STEM FEM L115MM DIA14MM UNIV KNEE FLUT PRESSFIT FOR ROT HNG: Type: IMPLANTABLE DEVICE | Site: KNEE | Status: FUNCTIONAL

## 2018-04-18 DEVICE — IMPLANTABLE DEVICE: Type: IMPLANTABLE DEVICE | Site: KNEE | Status: FUNCTIONAL

## 2018-04-18 DEVICE — SLEEVE FEM L34MM UNIV MTPHSEAL FULL POR LPS: Type: IMPLANTABLE DEVICE | Site: KNEE | Status: FUNCTIONAL

## 2018-04-18 DEVICE — STEM FEM L115MM DIA12MM UNIV KNEE FLUT PRESSFIT FOR ROT HNG: Type: IMPLANTABLE DEVICE | Site: KNEE | Status: FUNCTIONAL

## 2018-04-18 DEVICE — TRAY TIB SZ 3 AP45.8MM ML69.6MM THK4.8MM CEM KEELED MOB: Type: IMPLANTABLE DEVICE | Site: KNEE | Status: FUNCTIONAL

## 2018-04-18 DEVICE — AUGMENT FEM SZ 4 THK4MM POST KNEE TI BLK PRI CEM PFC SIG: Type: IMPLANTABLE DEVICE | Site: KNEE | Status: FUNCTIONAL

## 2018-04-18 DEVICE — ADAPTER FEM 5DEG KNEE PFC SIG: Type: IMPLANTABLE DEVICE | Site: KNEE | Status: FUNCTIONAL

## 2018-04-18 DEVICE — AGENT HEMSTAT HUM FBRN SEAL EVICEL KT: Type: IMPLANTABLE DEVICE | Site: KNEE | Status: FUNCTIONAL

## 2018-04-18 DEVICE — DISCONTINUED USE 415964 CEMENT PALACOS R + G SING DOSE 40GR: Type: IMPLANTABLE DEVICE | Site: KNEE | Status: FUNCTIONAL

## 2018-04-18 DEVICE — ADAPTER FEM +2/-2MM OFFSET BOLT PFC SIG: Type: IMPLANTABLE DEVICE | Site: KNEE | Status: FUNCTIONAL

## 2018-04-18 DEVICE — SLEEVE TIB H40MM AP27MM ML45MM MTPHSEAL TI PORCOAT POR REV: Type: IMPLANTABLE DEVICE | Site: KNEE | Status: FUNCTIONAL

## 2018-04-18 RX ORDER — SODIUM CHLORIDE, SODIUM LACTATE, POTASSIUM CHLORIDE, CALCIUM CHLORIDE 600; 310; 30; 20 MG/100ML; MG/100ML; MG/100ML; MG/100ML
INJECTION, SOLUTION INTRAVENOUS CONTINUOUS
Status: DISCONTINUED | OUTPATIENT
Start: 2018-04-18 | End: 2018-04-18

## 2018-04-18 RX ORDER — ROCURONIUM BROMIDE 10 MG/ML
INJECTION, SOLUTION INTRAVENOUS PRN
Status: DISCONTINUED | OUTPATIENT
Start: 2018-04-18 | End: 2018-04-18 | Stop reason: SDUPTHER

## 2018-04-18 RX ORDER — VANCOMYCIN HYDROCHLORIDE 1 G/200ML
1000 INJECTION, SOLUTION INTRAVENOUS ONCE
Status: COMPLETED | OUTPATIENT
Start: 2018-04-18 | End: 2018-04-18

## 2018-04-18 RX ORDER — PROMETHAZINE HYDROCHLORIDE 25 MG/ML
6.25 INJECTION, SOLUTION INTRAMUSCULAR; INTRAVENOUS
Status: DISCONTINUED | OUTPATIENT
Start: 2018-04-18 | End: 2018-04-18 | Stop reason: HOSPADM

## 2018-04-18 RX ORDER — FENTANYL CITRATE 50 UG/ML
25 INJECTION, SOLUTION INTRAMUSCULAR; INTRAVENOUS
Status: DISCONTINUED | OUTPATIENT
Start: 2018-04-18 | End: 2018-04-18 | Stop reason: HOSPADM

## 2018-04-18 RX ORDER — ACETAMINOPHEN 500 MG
1000 TABLET ORAL EVERY 8 HOURS
Status: DISCONTINUED | OUTPATIENT
Start: 2018-04-18 | End: 2018-04-23 | Stop reason: HOSPADM

## 2018-04-18 RX ORDER — HYDRALAZINE HYDROCHLORIDE 20 MG/ML
5 INJECTION INTRAMUSCULAR; INTRAVENOUS EVERY 10 MIN PRN
Status: DISCONTINUED | OUTPATIENT
Start: 2018-04-18 | End: 2018-04-18 | Stop reason: HOSPADM

## 2018-04-18 RX ORDER — BUPIVACAINE HYDROCHLORIDE 2.5 MG/ML
INJECTION, SOLUTION INFILTRATION; PERINEURAL PRN
Status: DISCONTINUED | OUTPATIENT
Start: 2018-04-18 | End: 2018-04-18 | Stop reason: HOSPADM

## 2018-04-18 RX ORDER — SODIUM CHLORIDE 0.9 % (FLUSH) 0.9 %
10 SYRINGE (ML) INJECTION PRN
Status: DISCONTINUED | OUTPATIENT
Start: 2018-04-18 | End: 2018-04-23 | Stop reason: HOSPADM

## 2018-04-18 RX ORDER — DIPHENHYDRAMINE HYDROCHLORIDE 50 MG/ML
12.5 INJECTION INTRAMUSCULAR; INTRAVENOUS
Status: DISCONTINUED | OUTPATIENT
Start: 2018-04-18 | End: 2018-04-18 | Stop reason: HOSPADM

## 2018-04-18 RX ORDER — OXYCODONE HCL 20 MG/1
20 TABLET, FILM COATED, EXTENDED RELEASE ORAL EVERY 12 HOURS SCHEDULED
Status: DISCONTINUED | OUTPATIENT
Start: 2018-04-18 | End: 2018-04-23 | Stop reason: HOSPADM

## 2018-04-18 RX ORDER — SODIUM CHLORIDE, SODIUM LACTATE, POTASSIUM CHLORIDE, CALCIUM CHLORIDE 600; 310; 30; 20 MG/100ML; MG/100ML; MG/100ML; MG/100ML
INJECTION, SOLUTION INTRAVENOUS CONTINUOUS PRN
Status: DISCONTINUED | OUTPATIENT
Start: 2018-04-18 | End: 2018-04-18 | Stop reason: SDUPTHER

## 2018-04-18 RX ORDER — DEXAMETHASONE SODIUM PHOSPHATE 10 MG/ML
INJECTION INTRAMUSCULAR; INTRAVENOUS PRN
Status: DISCONTINUED | OUTPATIENT
Start: 2018-04-18 | End: 2018-04-18 | Stop reason: SDUPTHER

## 2018-04-18 RX ORDER — MIDAZOLAM HYDROCHLORIDE 1 MG/ML
2 INJECTION INTRAMUSCULAR; INTRAVENOUS
Status: COMPLETED | OUTPATIENT
Start: 2018-04-18 | End: 2018-04-18

## 2018-04-18 RX ORDER — HYDROMORPHONE HCL IN 0.9% NACL 0.5 MG/ML
0.25 SYRINGE (ML) INTRAVENOUS EVERY 5 MIN PRN
Status: DISCONTINUED | OUTPATIENT
Start: 2018-04-18 | End: 2018-04-18 | Stop reason: HOSPADM

## 2018-04-18 RX ORDER — SUCCINYLCHOLINE CHLORIDE 20 MG/ML
INJECTION INTRAMUSCULAR; INTRAVENOUS PRN
Status: DISCONTINUED | OUTPATIENT
Start: 2018-04-18 | End: 2018-04-18 | Stop reason: SDUPTHER

## 2018-04-18 RX ORDER — DOCUSATE SODIUM 100 MG/1
100 CAPSULE, LIQUID FILLED ORAL 2 TIMES DAILY
Status: DISCONTINUED | OUTPATIENT
Start: 2018-04-18 | End: 2018-04-23 | Stop reason: HOSPADM

## 2018-04-18 RX ORDER — HYDROMORPHONE HCL IN 0.9% NACL 0.5 MG/ML
1 SYRINGE (ML) INTRAVENOUS
Status: DISCONTINUED | OUTPATIENT
Start: 2018-04-18 | End: 2018-04-23 | Stop reason: HOSPADM

## 2018-04-18 RX ORDER — SODIUM CHLORIDE 0.9 % (FLUSH) 0.9 %
10 SYRINGE (ML) INJECTION EVERY 12 HOURS SCHEDULED
Status: DISCONTINUED | OUTPATIENT
Start: 2018-04-18 | End: 2018-04-23 | Stop reason: HOSPADM

## 2018-04-18 RX ORDER — ONDANSETRON 2 MG/ML
4 INJECTION INTRAMUSCULAR; INTRAVENOUS
Status: DISCONTINUED | OUTPATIENT
Start: 2018-04-18 | End: 2018-04-18 | Stop reason: HOSPADM

## 2018-04-18 RX ORDER — TAMSULOSIN HYDROCHLORIDE 0.4 MG/1
0.4 CAPSULE ORAL NIGHTLY
Status: DISCONTINUED | OUTPATIENT
Start: 2018-04-18 | End: 2018-04-23 | Stop reason: HOSPADM

## 2018-04-18 RX ORDER — OXYCODONE AND ACETAMINOPHEN 10; 325 MG/1; MG/1
1 TABLET ORAL 4 TIMES DAILY PRN
Qty: 120 TABLET | Refills: 0 | Status: SHIPPED | OUTPATIENT
Start: 2018-04-23 | End: 2018-05-23

## 2018-04-18 RX ORDER — SODIUM CHLORIDE 0.9 % (FLUSH) 0.9 %
10 SYRINGE (ML) INJECTION PRN
Status: DISCONTINUED | OUTPATIENT
Start: 2018-04-18 | End: 2018-04-18 | Stop reason: HOSPADM

## 2018-04-18 RX ORDER — OXYCODONE HYDROCHLORIDE 5 MG/1
10 TABLET ORAL EVERY 4 HOURS PRN
Status: DISCONTINUED | OUTPATIENT
Start: 2018-04-18 | End: 2018-04-23 | Stop reason: HOSPADM

## 2018-04-18 RX ORDER — PREGABALIN 75 MG/1
75 CAPSULE ORAL ONCE
Status: COMPLETED | OUTPATIENT
Start: 2018-04-18 | End: 2018-04-18

## 2018-04-18 RX ORDER — OXYCODONE HYDROCHLORIDE 5 MG/1
20 TABLET ORAL EVERY 4 HOURS PRN
Status: DISCONTINUED | OUTPATIENT
Start: 2018-04-18 | End: 2018-04-23 | Stop reason: HOSPADM

## 2018-04-18 RX ORDER — FENTANYL CITRATE 50 UG/ML
INJECTION, SOLUTION INTRAMUSCULAR; INTRAVENOUS PRN
Status: DISCONTINUED | OUTPATIENT
Start: 2018-04-18 | End: 2018-04-18 | Stop reason: SDUPTHER

## 2018-04-18 RX ORDER — ONDANSETRON 2 MG/ML
INJECTION INTRAMUSCULAR; INTRAVENOUS PRN
Status: DISCONTINUED | OUTPATIENT
Start: 2018-04-18 | End: 2018-04-18 | Stop reason: SDUPTHER

## 2018-04-18 RX ORDER — SODIUM CHLORIDE 0.9 % (FLUSH) 0.9 %
10 SYRINGE (ML) INJECTION EVERY 12 HOURS SCHEDULED
Status: DISCONTINUED | OUTPATIENT
Start: 2018-04-18 | End: 2018-04-18 | Stop reason: HOSPADM

## 2018-04-18 RX ORDER — ROPINIROLE 0.25 MG/1
0.25 TABLET, FILM COATED ORAL NIGHTLY
Status: DISCONTINUED | OUTPATIENT
Start: 2018-04-18 | End: 2018-04-23 | Stop reason: HOSPADM

## 2018-04-18 RX ORDER — PROPOFOL 10 MG/ML
INJECTION, EMULSION INTRAVENOUS PRN
Status: DISCONTINUED | OUTPATIENT
Start: 2018-04-18 | End: 2018-04-18 | Stop reason: SDUPTHER

## 2018-04-18 RX ORDER — 0.9 % SODIUM CHLORIDE 0.9 %
500 INTRAVENOUS SOLUTION INTRAVENOUS PRN
Status: DISCONTINUED | OUTPATIENT
Start: 2018-04-18 | End: 2018-04-23 | Stop reason: HOSPADM

## 2018-04-18 RX ORDER — CITALOPRAM 40 MG/1
40 TABLET ORAL DAILY
Status: DISCONTINUED | OUTPATIENT
Start: 2018-04-19 | End: 2018-04-23 | Stop reason: HOSPADM

## 2018-04-18 RX ORDER — MORPHINE SULFATE 4 MG/ML
2 INJECTION, SOLUTION INTRAMUSCULAR; INTRAVENOUS EVERY 5 MIN PRN
Status: DISCONTINUED | OUTPATIENT
Start: 2018-04-18 | End: 2018-04-18 | Stop reason: HOSPADM

## 2018-04-18 RX ORDER — HYDROMORPHONE HCL IN 0.9% NACL 0.5 MG/ML
2 SYRINGE (ML) INTRAVENOUS
Status: DISCONTINUED | OUTPATIENT
Start: 2018-04-18 | End: 2018-04-23 | Stop reason: HOSPADM

## 2018-04-18 RX ORDER — CELECOXIB 200 MG/1
200 CAPSULE ORAL ONCE
Status: COMPLETED | OUTPATIENT
Start: 2018-04-18 | End: 2018-04-18

## 2018-04-18 RX ORDER — MEPERIDINE HYDROCHLORIDE 50 MG/ML
12.5 INJECTION INTRAMUSCULAR; INTRAVENOUS; SUBCUTANEOUS EVERY 5 MIN PRN
Status: DISCONTINUED | OUTPATIENT
Start: 2018-04-18 | End: 2018-04-18 | Stop reason: HOSPADM

## 2018-04-18 RX ORDER — LIDOCAINE HYDROCHLORIDE 10 MG/ML
1 INJECTION, SOLUTION EPIDURAL; INFILTRATION; INTRACAUDAL; PERINEURAL
Status: DISCONTINUED | OUTPATIENT
Start: 2018-04-18 | End: 2018-04-18 | Stop reason: HOSPADM

## 2018-04-18 RX ORDER — KETAMINE HYDROCHLORIDE 100 MG/ML
INJECTION, SOLUTION INTRAMUSCULAR; INTRAVENOUS PRN
Status: DISCONTINUED | OUTPATIENT
Start: 2018-04-18 | End: 2018-04-18 | Stop reason: SDUPTHER

## 2018-04-18 RX ORDER — LIDOCAINE HYDROCHLORIDE 10 MG/ML
INJECTION, SOLUTION EPIDURAL; INFILTRATION; INTRACAUDAL; PERINEURAL PRN
Status: DISCONTINUED | OUTPATIENT
Start: 2018-04-18 | End: 2018-04-18 | Stop reason: SDUPTHER

## 2018-04-18 RX ORDER — HYDROMORPHONE HCL IN 0.9% NACL 0.5 MG/ML
0.5 SYRINGE (ML) INTRAVENOUS EVERY 5 MIN PRN
Status: DISCONTINUED | OUTPATIENT
Start: 2018-04-18 | End: 2018-04-18 | Stop reason: HOSPADM

## 2018-04-18 RX ORDER — TRAMADOL HYDROCHLORIDE 50 MG/1
50 TABLET ORAL EVERY 6 HOURS
Status: DISCONTINUED | OUTPATIENT
Start: 2018-04-18 | End: 2018-04-23 | Stop reason: HOSPADM

## 2018-04-18 RX ORDER — LABETALOL HYDROCHLORIDE 5 MG/ML
5 INJECTION, SOLUTION INTRAVENOUS EVERY 10 MIN PRN
Status: DISCONTINUED | OUTPATIENT
Start: 2018-04-18 | End: 2018-04-18 | Stop reason: HOSPADM

## 2018-04-18 RX ORDER — VANCOMYCIN HYDROCHLORIDE 1 G/200ML
1000 INJECTION, SOLUTION INTRAVENOUS EVERY 12 HOURS
Status: COMPLETED | OUTPATIENT
Start: 2018-04-19 | End: 2018-04-20

## 2018-04-18 RX ORDER — MORPHINE SULFATE 4 MG/ML
1 INJECTION, SOLUTION INTRAMUSCULAR; INTRAVENOUS EVERY 5 MIN PRN
Status: DISCONTINUED | OUTPATIENT
Start: 2018-04-18 | End: 2018-04-18 | Stop reason: HOSPADM

## 2018-04-18 RX ORDER — HYDROMORPHONE HCL IN 0.9% NACL 0.5 MG/ML
0.5 SYRINGE (ML) INTRAVENOUS
Status: DISCONTINUED | OUTPATIENT
Start: 2018-04-18 | End: 2018-04-23 | Stop reason: HOSPADM

## 2018-04-18 RX ORDER — OXYCODONE HCL 10 MG/1
10 TABLET, FILM COATED, EXTENDED RELEASE ORAL ONCE
Status: COMPLETED | OUTPATIENT
Start: 2018-04-18 | End: 2018-04-18

## 2018-04-18 RX ORDER — ONDANSETRON 2 MG/ML
4 INJECTION INTRAMUSCULAR; INTRAVENOUS EVERY 6 HOURS PRN
Status: DISCONTINUED | OUTPATIENT
Start: 2018-04-18 | End: 2018-04-23 | Stop reason: HOSPADM

## 2018-04-18 RX ORDER — DIPHENHYDRAMINE HCL 25 MG
25 TABLET ORAL EVERY 6 HOURS PRN
Status: DISCONTINUED | OUTPATIENT
Start: 2018-04-18 | End: 2018-04-23 | Stop reason: HOSPADM

## 2018-04-18 RX ORDER — DEXAMETHASONE SODIUM PHOSPHATE 10 MG/ML
10 INJECTION INTRAMUSCULAR; INTRAVENOUS ONCE
Status: DISCONTINUED | OUTPATIENT
Start: 2018-04-18 | End: 2018-04-23 | Stop reason: HOSPADM

## 2018-04-18 RX ORDER — ROPIVACAINE HYDROCHLORIDE 5 MG/ML
INJECTION, SOLUTION EPIDURAL; INFILTRATION; PERINEURAL PRN
Status: DISCONTINUED | OUTPATIENT
Start: 2018-04-18 | End: 2018-04-18 | Stop reason: SDUPTHER

## 2018-04-18 RX ORDER — DIAZEPAM 5 MG/1
5 TABLET ORAL EVERY 6 HOURS PRN
Status: DISCONTINUED | OUTPATIENT
Start: 2018-04-18 | End: 2018-04-23 | Stop reason: HOSPADM

## 2018-04-18 RX ORDER — FENTANYL CITRATE 50 UG/ML
50 INJECTION, SOLUTION INTRAMUSCULAR; INTRAVENOUS
Status: DISCONTINUED | OUTPATIENT
Start: 2018-04-18 | End: 2018-04-18 | Stop reason: HOSPADM

## 2018-04-18 RX ORDER — PANTOPRAZOLE SODIUM 40 MG/1
40 TABLET, DELAYED RELEASE ORAL
Status: DISCONTINUED | OUTPATIENT
Start: 2018-04-19 | End: 2018-04-23 | Stop reason: HOSPADM

## 2018-04-18 RX ORDER — FLUTICASONE PROPIONATE 50 MCG
1 SPRAY, SUSPENSION (ML) NASAL 2 TIMES DAILY
Status: DISCONTINUED | OUTPATIENT
Start: 2018-04-18 | End: 2018-04-23 | Stop reason: HOSPADM

## 2018-04-18 RX ORDER — SUFENTANIL CITRATE 50 UG/ML
INJECTION EPIDURAL; INTRAVENOUS PRN
Status: DISCONTINUED | OUTPATIENT
Start: 2018-04-18 | End: 2018-04-18 | Stop reason: SDUPTHER

## 2018-04-18 RX ORDER — TRANEXAMIC ACID 100 MG/ML
INJECTION, SOLUTION INTRAVENOUS PRN
Status: DISCONTINUED | OUTPATIENT
Start: 2018-04-18 | End: 2018-04-18 | Stop reason: SDUPTHER

## 2018-04-18 RX ORDER — OXYCODONE HYDROCHLORIDE 5 MG/1
5 TABLET ORAL EVERY 4 HOURS PRN
Status: DISCONTINUED | OUTPATIENT
Start: 2018-04-18 | End: 2018-04-23 | Stop reason: HOSPADM

## 2018-04-18 RX ORDER — ACETAMINOPHEN 500 MG
1000 TABLET ORAL ONCE
Status: COMPLETED | OUTPATIENT
Start: 2018-04-18 | End: 2018-04-18

## 2018-04-18 RX ORDER — ENALAPRILAT 2.5 MG/2ML
1.25 INJECTION INTRAVENOUS
Status: DISCONTINUED | OUTPATIENT
Start: 2018-04-18 | End: 2018-04-18 | Stop reason: HOSPADM

## 2018-04-18 RX ORDER — SODIUM CHLORIDE 9 MG/ML
INJECTION, SOLUTION INTRAVENOUS CONTINUOUS
Status: DISCONTINUED | OUTPATIENT
Start: 2018-04-18 | End: 2018-04-23 | Stop reason: HOSPADM

## 2018-04-18 RX ADMIN — HYDROMORPHONE HYDROCHLORIDE 0.5 MG: 1 INJECTION, SOLUTION INTRAMUSCULAR; INTRAVENOUS; SUBCUTANEOUS at 18:13

## 2018-04-18 RX ADMIN — TRANEXAMIC ACID 1000 MG: 100 INJECTION, SOLUTION INTRAVENOUS at 15:00

## 2018-04-18 RX ADMIN — TRAMADOL HYDROCHLORIDE 50 MG: 50 TABLET, FILM COATED ORAL at 22:28

## 2018-04-18 RX ADMIN — ROPINIROLE HYDROCHLORIDE 0.25 MG: 0.25 TABLET, FILM COATED ORAL at 22:28

## 2018-04-18 RX ADMIN — PROPOFOL 150 MG: 10 INJECTION, EMULSION INTRAVENOUS at 14:44

## 2018-04-18 RX ADMIN — MIDAZOLAM 2 MG: 1 INJECTION INTRAMUSCULAR; INTRAVENOUS at 14:30

## 2018-04-18 RX ADMIN — SUFENTANIL CITRATE 5 MCG: 50 INJECTION EPIDURAL; INTRAVENOUS at 19:21

## 2018-04-18 RX ADMIN — SUGAMMADEX 150 MG: 100 INJECTION, SOLUTION INTRAVENOUS at 18:01

## 2018-04-18 RX ADMIN — FENTANYL CITRATE 100 MCG: 50 INJECTION, SOLUTION INTRAMUSCULAR; INTRAVENOUS at 15:55

## 2018-04-18 RX ADMIN — PHENYLEPHRINE HYDROCHLORIDE 80 MCG: 10 INJECTION INTRAVENOUS at 17:52

## 2018-04-18 RX ADMIN — ROCURONIUM BROMIDE 50 MG: 10 INJECTION INTRAVENOUS at 14:44

## 2018-04-18 RX ADMIN — ACETAMINOPHEN 1000 MG: 500 TABLET ORAL at 13:43

## 2018-04-18 RX ADMIN — SODIUM CHLORIDE, POTASSIUM CHLORIDE, SODIUM LACTATE AND CALCIUM CHLORIDE: 600; 310; 30; 20 INJECTION, SOLUTION INTRAVENOUS at 13:43

## 2018-04-18 RX ADMIN — OXYCODONE HYDROCHLORIDE 10 MG: 10 TABLET, FILM COATED, EXTENDED RELEASE ORAL at 13:43

## 2018-04-18 RX ADMIN — HYDROMORPHONE HYDROCHLORIDE 0.5 MG: 1 INJECTION, SOLUTION INTRAMUSCULAR; INTRAVENOUS; SUBCUTANEOUS at 16:15

## 2018-04-18 RX ADMIN — VANCOMYCIN HYDROCHLORIDE 1000 MG: 1 INJECTION, SOLUTION INTRAVENOUS at 13:43

## 2018-04-18 RX ADMIN — Medication 0.5 MG: at 19:56

## 2018-04-18 RX ADMIN — Medication 30 MG: at 17:14

## 2018-04-18 RX ADMIN — CELECOXIB 200 MG: 200 CAPSULE ORAL at 13:43

## 2018-04-18 RX ADMIN — TAMSULOSIN HYDROCHLORIDE 0.4 MG: 0.4 CAPSULE ORAL at 22:29

## 2018-04-18 RX ADMIN — ACETAMINOPHEN 1000 MG: 500 TABLET, FILM COATED ORAL at 22:29

## 2018-04-18 RX ADMIN — ONDANSETRON HYDROCHLORIDE 4 MG: 2 SOLUTION INTRAMUSCULAR; INTRAVENOUS at 17:58

## 2018-04-18 RX ADMIN — LIDOCAINE HYDROCHLORIDE 5 ML: 10 INJECTION, SOLUTION EPIDURAL; INFILTRATION; INTRACAUDAL; PERINEURAL at 14:44

## 2018-04-18 RX ADMIN — SODIUM CHLORIDE, SODIUM LACTATE, POTASSIUM CHLORIDE, AND CALCIUM CHLORIDE: 600; 310; 30; 20 INJECTION, SOLUTION INTRAVENOUS at 17:40

## 2018-04-18 RX ADMIN — Medication 80 MG: at 18:54

## 2018-04-18 RX ADMIN — SODIUM CHLORIDE: 9 INJECTION, SOLUTION INTRAVENOUS at 22:29

## 2018-04-18 RX ADMIN — ROPIVACAINE HYDROCHLORIDE 20 ML: 5 INJECTION, SOLUTION EPIDURAL; INFILTRATION; PERINEURAL at 14:30

## 2018-04-18 RX ADMIN — OXYCODONE HYDROCHLORIDE 20 MG: 5 TABLET ORAL at 23:43

## 2018-04-18 RX ADMIN — PHENYLEPHRINE HYDROCHLORIDE 80 MCG: 10 INJECTION INTRAVENOUS at 17:59

## 2018-04-18 RX ADMIN — DOCUSATE SODIUM 100 MG: 100 CAPSULE, LIQUID FILLED ORAL at 22:28

## 2018-04-18 RX ADMIN — ROCURONIUM BROMIDE 50 MG: 10 INJECTION INTRAVENOUS at 15:55

## 2018-04-18 RX ADMIN — TRANEXAMIC ACID 1000 MG: 100 INJECTION, SOLUTION INTRAVENOUS at 17:15

## 2018-04-18 RX ADMIN — SODIUM CHLORIDE 2 G: 9 INJECTION INTRAMUSCULAR; INTRAVENOUS; SUBCUTANEOUS at 15:00

## 2018-04-18 RX ADMIN — SUFENTANIL CITRATE 5 MCG: 50 INJECTION EPIDURAL; INTRAVENOUS at 19:26

## 2018-04-18 RX ADMIN — DEXAMETHASONE SODIUM PHOSPHATE 10 MG: 10 INJECTION INTRAMUSCULAR; INTRAVENOUS at 14:44

## 2018-04-18 RX ADMIN — Medication 40 MG: at 18:57

## 2018-04-18 RX ADMIN — OXYCODONE HYDROCHLORIDE 20 MG: 20 TABLET, FILM COATED, EXTENDED RELEASE ORAL at 22:28

## 2018-04-18 RX ADMIN — FLUTICASONE PROPIONATE 1 SPRAY: 50 SPRAY, METERED NASAL at 22:28

## 2018-04-18 RX ADMIN — FENTANYL CITRATE 50 MCG: 50 INJECTION, SOLUTION INTRAMUSCULAR; INTRAVENOUS at 15:30

## 2018-04-18 RX ADMIN — SODIUM CHLORIDE, SODIUM LACTATE, POTASSIUM CHLORIDE, AND CALCIUM CHLORIDE: 600; 310; 30; 20 INJECTION, SOLUTION INTRAVENOUS at 15:15

## 2018-04-18 RX ADMIN — PREGABALIN 75 MG: 75 CAPSULE ORAL at 13:43

## 2018-04-18 RX ADMIN — Medication 0.5 MG: at 19:35

## 2018-04-18 RX ADMIN — FENTANYL CITRATE 100 MCG: 50 INJECTION, SOLUTION INTRAMUSCULAR; INTRAVENOUS at 14:44

## 2018-04-18 RX ADMIN — SODIUM CHLORIDE, SODIUM LACTATE, POTASSIUM CHLORIDE, AND CALCIUM CHLORIDE: 600; 310; 30; 20 INJECTION, SOLUTION INTRAVENOUS at 14:40

## 2018-04-18 ASSESSMENT — PAIN SCALES - GENERAL
PAINLEVEL_OUTOF10: 7
PAINLEVEL_OUTOF10: 10

## 2018-04-18 ASSESSMENT — LIFESTYLE VARIABLES: SMOKING_STATUS: 0

## 2018-04-18 ASSESSMENT — PAIN - FUNCTIONAL ASSESSMENT: PAIN_FUNCTIONAL_ASSESSMENT: 0-10

## 2018-04-18 ASSESSMENT — PAIN DESCRIPTION - DESCRIPTORS: DESCRIPTORS: ACHING

## 2018-04-18 ASSESSMENT — PAIN DESCRIPTION - PAIN TYPE: TYPE: CHRONIC PAIN;ACUTE PAIN

## 2018-04-19 LAB
ANION GAP SERPL CALCULATED.3IONS-SCNC: 8 MMOL/L (ref 7–19)
BUN BLDV-MCNC: 10 MG/DL (ref 8–23)
CALCIUM SERPL-MCNC: 8.1 MG/DL (ref 8.8–10.2)
CHLORIDE BLD-SCNC: 101 MMOL/L (ref 98–111)
CO2: 27 MMOL/L (ref 22–29)
CREAT SERPL-MCNC: 0.9 MG/DL (ref 0.5–1.2)
GFR NON-AFRICAN AMERICAN: >60
GLUCOSE BLD-MCNC: 168 MG/DL (ref 74–109)
HCT VFR BLD CALC: 35.2 % (ref 42–52)
HEMOGLOBIN: 11.5 G/DL (ref 14–18)
POTASSIUM REFLEX MAGNESIUM: 4.4 MMOL/L (ref 3.5–5)
SODIUM BLD-SCNC: 136 MMOL/L (ref 136–145)

## 2018-04-19 PROCEDURE — 97161 PT EVAL LOW COMPLEX 20 MIN: CPT

## 2018-04-19 PROCEDURE — 6360000002 HC RX W HCPCS: Performed by: ORTHOPAEDIC SURGERY

## 2018-04-19 PROCEDURE — 85018 HEMOGLOBIN: CPT

## 2018-04-19 PROCEDURE — 6370000000 HC RX 637 (ALT 250 FOR IP): Performed by: ORTHOPAEDIC SURGERY

## 2018-04-19 PROCEDURE — 80048 BASIC METABOLIC PNL TOTAL CA: CPT

## 2018-04-19 PROCEDURE — G8979 MOBILITY GOAL STATUS: HCPCS

## 2018-04-19 PROCEDURE — 36415 COLL VENOUS BLD VENIPUNCTURE: CPT

## 2018-04-19 PROCEDURE — 1210000000 HC MED SURG R&B

## 2018-04-19 PROCEDURE — 2580000003 HC RX 258: Performed by: ORTHOPAEDIC SURGERY

## 2018-04-19 PROCEDURE — 85014 HEMATOCRIT: CPT

## 2018-04-19 PROCEDURE — G8978 MOBILITY CURRENT STATUS: HCPCS

## 2018-04-19 RX ADMIN — ACETAMINOPHEN 1000 MG: 500 TABLET, FILM COATED ORAL at 17:00

## 2018-04-19 RX ADMIN — OXYCODONE HYDROCHLORIDE 20 MG: 5 TABLET ORAL at 20:15

## 2018-04-19 RX ADMIN — TAMSULOSIN HYDROCHLORIDE 0.4 MG: 0.4 CAPSULE ORAL at 20:14

## 2018-04-19 RX ADMIN — Medication 1 MG: at 14:18

## 2018-04-19 RX ADMIN — SODIUM CHLORIDE: 9 INJECTION, SOLUTION INTRAVENOUS at 05:55

## 2018-04-19 RX ADMIN — ROPINIROLE HYDROCHLORIDE 0.25 MG: 0.25 TABLET, FILM COATED ORAL at 20:14

## 2018-04-19 RX ADMIN — OXYCODONE HYDROCHLORIDE 20 MG: 5 TABLET ORAL at 12:38

## 2018-04-19 RX ADMIN — PANTOPRAZOLE SODIUM 40 MG: 40 TABLET, DELAYED RELEASE ORAL at 05:51

## 2018-04-19 RX ADMIN — FLUTICASONE PROPIONATE 1 SPRAY: 50 SPRAY, METERED NASAL at 20:14

## 2018-04-19 RX ADMIN — ACETAMINOPHEN 1000 MG: 500 TABLET, FILM COATED ORAL at 05:51

## 2018-04-19 RX ADMIN — Medication 2 G: at 03:12

## 2018-04-19 RX ADMIN — TRAMADOL HYDROCHLORIDE 50 MG: 50 TABLET, FILM COATED ORAL at 21:46

## 2018-04-19 RX ADMIN — TRAMADOL HYDROCHLORIDE 50 MG: 50 TABLET, FILM COATED ORAL at 03:13

## 2018-04-19 RX ADMIN — Medication 1 MG: at 05:52

## 2018-04-19 RX ADMIN — FLUTICASONE PROPIONATE 1 SPRAY: 50 SPRAY, METERED NASAL at 08:58

## 2018-04-19 RX ADMIN — TRAMADOL HYDROCHLORIDE 50 MG: 50 TABLET, FILM COATED ORAL at 17:00

## 2018-04-19 RX ADMIN — Medication 10 ML: at 20:15

## 2018-04-19 RX ADMIN — RIVAROXABAN 10 MG: 10 TABLET, FILM COATED ORAL at 03:13

## 2018-04-19 RX ADMIN — Medication 1 MG: at 18:45

## 2018-04-19 RX ADMIN — DIAZEPAM 5 MG: 5 TABLET ORAL at 20:14

## 2018-04-19 RX ADMIN — Medication 1 MG: at 21:46

## 2018-04-19 RX ADMIN — CITALOPRAM HYDROBROMIDE 40 MG: 40 TABLET ORAL at 08:57

## 2018-04-19 RX ADMIN — Medication 1 MG: at 10:03

## 2018-04-19 RX ADMIN — Medication 1 MG: at 01:51

## 2018-04-19 RX ADMIN — VANCOMYCIN HYDROCHLORIDE 1000 MG: 1 INJECTION, SOLUTION INTRAVENOUS at 01:45

## 2018-04-19 RX ADMIN — OXYCODONE HYDROCHLORIDE 20 MG: 20 TABLET, FILM COATED, EXTENDED RELEASE ORAL at 20:14

## 2018-04-19 RX ADMIN — ACETAMINOPHEN 1000 MG: 500 TABLET, FILM COATED ORAL at 21:46

## 2018-04-19 RX ADMIN — OXYCODONE HYDROCHLORIDE 10 MG: 5 TABLET ORAL at 04:38

## 2018-04-19 RX ADMIN — VANCOMYCIN HYDROCHLORIDE 1000 MG: 1 INJECTION, SOLUTION INTRAVENOUS at 15:53

## 2018-04-19 RX ADMIN — TRAMADOL HYDROCHLORIDE 50 MG: 50 TABLET, FILM COATED ORAL at 08:57

## 2018-04-19 RX ADMIN — OXYCODONE HYDROCHLORIDE 20 MG: 20 TABLET, FILM COATED, EXTENDED RELEASE ORAL at 08:57

## 2018-04-19 RX ADMIN — Medication 2 G: at 17:00

## 2018-04-19 RX ADMIN — DOCUSATE SODIUM 100 MG: 100 CAPSULE, LIQUID FILLED ORAL at 20:14

## 2018-04-19 RX ADMIN — DOCUSATE SODIUM 100 MG: 100 CAPSULE, LIQUID FILLED ORAL at 08:57

## 2018-04-19 ASSESSMENT — PAIN SCALES - GENERAL
PAINLEVEL_OUTOF10: 4
PAINLEVEL_OUTOF10: 4
PAINLEVEL_OUTOF10: 7
PAINLEVEL_OUTOF10: 9
PAINLEVEL_OUTOF10: 7
PAINLEVEL_OUTOF10: 4
PAINLEVEL_OUTOF10: 7
PAINLEVEL_OUTOF10: 7
PAINLEVEL_OUTOF10: 4
PAINLEVEL_OUTOF10: 4
PAINLEVEL_OUTOF10: 3
PAINLEVEL_OUTOF10: 7
PAINLEVEL_OUTOF10: 8
PAINLEVEL_OUTOF10: 6
PAINLEVEL_OUTOF10: 7
PAINLEVEL_OUTOF10: 6
PAINLEVEL_OUTOF10: 4
PAINLEVEL_OUTOF10: 7
PAINLEVEL_OUTOF10: 5
PAINLEVEL_OUTOF10: 8

## 2018-04-20 LAB
ANION GAP SERPL CALCULATED.3IONS-SCNC: 11 MMOL/L (ref 7–19)
BUN BLDV-MCNC: 6 MG/DL (ref 8–23)
CALCIUM SERPL-MCNC: 8.3 MG/DL (ref 8.8–10.2)
CHLORIDE BLD-SCNC: 101 MMOL/L (ref 98–111)
CO2: 30 MMOL/L (ref 22–29)
CREAT SERPL-MCNC: 0.7 MG/DL (ref 0.5–1.2)
GFR NON-AFRICAN AMERICAN: >60
GLUCOSE BLD-MCNC: 136 MG/DL (ref 74–109)
HBA1C MFR BLD: 5.5 %
HCT VFR BLD CALC: 31.4 % (ref 42–52)
HEMOGLOBIN: 10.3 G/DL (ref 14–18)
MAGNESIUM: 2 MG/DL (ref 1.6–2.4)
MCH RBC QN AUTO: 30.5 PG (ref 27–31)
MCHC RBC AUTO-ENTMCNC: 32.8 G/DL (ref 33–37)
MCV RBC AUTO: 92.9 FL (ref 80–94)
PDW BLD-RTO: 13.4 % (ref 11.5–14.5)
PLATELET # BLD: 152 K/UL (ref 130–400)
PMV BLD AUTO: 10.2 FL (ref 9.4–12.4)
POTASSIUM REFLEX MAGNESIUM: 3.3 MMOL/L (ref 3.5–5)
RBC # BLD: 3.38 M/UL (ref 4.7–6.1)
SODIUM BLD-SCNC: 142 MMOL/L (ref 136–145)
WBC # BLD: 13.5 K/UL (ref 4.8–10.8)

## 2018-04-20 PROCEDURE — 85027 COMPLETE CBC AUTOMATED: CPT

## 2018-04-20 PROCEDURE — 1210000000 HC MED SURG R&B

## 2018-04-20 PROCEDURE — 83735 ASSAY OF MAGNESIUM: CPT

## 2018-04-20 PROCEDURE — 6360000002 HC RX W HCPCS: Performed by: ORTHOPAEDIC SURGERY

## 2018-04-20 PROCEDURE — 97116 GAIT TRAINING THERAPY: CPT

## 2018-04-20 PROCEDURE — 2580000003 HC RX 258: Performed by: ORTHOPAEDIC SURGERY

## 2018-04-20 PROCEDURE — 83036 HEMOGLOBIN GLYCOSYLATED A1C: CPT

## 2018-04-20 PROCEDURE — 36415 COLL VENOUS BLD VENIPUNCTURE: CPT

## 2018-04-20 PROCEDURE — 6370000000 HC RX 637 (ALT 250 FOR IP): Performed by: ORTHOPAEDIC SURGERY

## 2018-04-20 PROCEDURE — 6370000000 HC RX 637 (ALT 250 FOR IP): Performed by: FAMILY MEDICINE

## 2018-04-20 PROCEDURE — 80048 BASIC METABOLIC PNL TOTAL CA: CPT

## 2018-04-20 RX ORDER — POTASSIUM CHLORIDE 20 MEQ/1
40 TABLET, EXTENDED RELEASE ORAL ONCE
Status: COMPLETED | OUTPATIENT
Start: 2018-04-20 | End: 2018-04-20

## 2018-04-20 RX ADMIN — OXYCODONE HYDROCHLORIDE 20 MG: 5 TABLET ORAL at 19:07

## 2018-04-20 RX ADMIN — OXYCODONE HYDROCHLORIDE 20 MG: 20 TABLET, FILM COATED, EXTENDED RELEASE ORAL at 09:00

## 2018-04-20 RX ADMIN — TAMSULOSIN HYDROCHLORIDE 0.4 MG: 0.4 CAPSULE ORAL at 20:08

## 2018-04-20 RX ADMIN — Medication 1 MG: at 01:21

## 2018-04-20 RX ADMIN — PANTOPRAZOLE SODIUM 40 MG: 40 TABLET, DELAYED RELEASE ORAL at 09:04

## 2018-04-20 RX ADMIN — CITALOPRAM HYDROBROMIDE 40 MG: 40 TABLET ORAL at 09:00

## 2018-04-20 RX ADMIN — Medication 0.5 MG: at 16:17

## 2018-04-20 RX ADMIN — VANCOMYCIN HYDROCHLORIDE 1000 MG: 1 INJECTION, SOLUTION INTRAVENOUS at 14:49

## 2018-04-20 RX ADMIN — OXYCODONE HYDROCHLORIDE 20 MG: 5 TABLET ORAL at 04:19

## 2018-04-20 RX ADMIN — Medication 10 ML: at 09:05

## 2018-04-20 RX ADMIN — DIAZEPAM 5 MG: 5 TABLET ORAL at 04:19

## 2018-04-20 RX ADMIN — TRAMADOL HYDROCHLORIDE 50 MG: 50 TABLET, FILM COATED ORAL at 16:17

## 2018-04-20 RX ADMIN — VANCOMYCIN HYDROCHLORIDE 1000 MG: 1 INJECTION, SOLUTION INTRAVENOUS at 01:21

## 2018-04-20 RX ADMIN — OXYCODONE HYDROCHLORIDE 20 MG: 5 TABLET ORAL at 00:16

## 2018-04-20 RX ADMIN — TRAMADOL HYDROCHLORIDE 50 MG: 50 TABLET, FILM COATED ORAL at 09:00

## 2018-04-20 RX ADMIN — OXYCODONE HYDROCHLORIDE 20 MG: 20 TABLET, FILM COATED, EXTENDED RELEASE ORAL at 20:08

## 2018-04-20 RX ADMIN — Medication 1 MG: at 06:40

## 2018-04-20 RX ADMIN — TRAMADOL HYDROCHLORIDE 50 MG: 50 TABLET, FILM COATED ORAL at 20:08

## 2018-04-20 RX ADMIN — Medication 2 G: at 14:49

## 2018-04-20 RX ADMIN — ROPINIROLE HYDROCHLORIDE 0.25 MG: 0.25 TABLET, FILM COATED ORAL at 20:08

## 2018-04-20 RX ADMIN — ACETAMINOPHEN 1000 MG: 500 TABLET, FILM COATED ORAL at 14:49

## 2018-04-20 RX ADMIN — POTASSIUM CHLORIDE 40 MEQ: 20 TABLET, EXTENDED RELEASE ORAL at 20:12

## 2018-04-20 RX ADMIN — DOCUSATE SODIUM 100 MG: 100 CAPSULE, LIQUID FILLED ORAL at 20:08

## 2018-04-20 RX ADMIN — RIVAROXABAN 10 MG: 10 TABLET, FILM COATED ORAL at 09:00

## 2018-04-20 RX ADMIN — TRAMADOL HYDROCHLORIDE 50 MG: 50 TABLET, FILM COATED ORAL at 04:19

## 2018-04-20 RX ADMIN — OXYCODONE HYDROCHLORIDE 20 MG: 5 TABLET ORAL at 12:27

## 2018-04-20 RX ADMIN — DOCUSATE SODIUM 100 MG: 100 CAPSULE, LIQUID FILLED ORAL at 09:00

## 2018-04-20 RX ADMIN — ACETAMINOPHEN 1000 MG: 500 TABLET, FILM COATED ORAL at 05:21

## 2018-04-20 RX ADMIN — Medication 10 ML: at 20:13

## 2018-04-20 RX ADMIN — ACETAMINOPHEN 1000 MG: 500 TABLET, FILM COATED ORAL at 20:07

## 2018-04-20 RX ADMIN — FLUTICASONE PROPIONATE 1 SPRAY: 50 SPRAY, METERED NASAL at 09:00

## 2018-04-20 RX ADMIN — Medication 2 G: at 02:36

## 2018-04-20 RX ADMIN — OXYCODONE HYDROCHLORIDE 20 MG: 5 TABLET ORAL at 23:13

## 2018-04-20 ASSESSMENT — PAIN SCALES - GENERAL
PAINLEVEL_OUTOF10: 6
PAINLEVEL_OUTOF10: 5
PAINLEVEL_OUTOF10: 0
PAINLEVEL_OUTOF10: 6
PAINLEVEL_OUTOF10: 5
PAINLEVEL_OUTOF10: 3
PAINLEVEL_OUTOF10: 8
PAINLEVEL_OUTOF10: 7
PAINLEVEL_OUTOF10: 0
PAINLEVEL_OUTOF10: 8
PAINLEVEL_OUTOF10: 8
PAINLEVEL_OUTOF10: 7
PAINLEVEL_OUTOF10: 7
PAINLEVEL_OUTOF10: 3
PAINLEVEL_OUTOF10: 0
PAINLEVEL_OUTOF10: 2
PAINLEVEL_OUTOF10: 3

## 2018-04-21 PROBLEM — C92.10 CML (CHRONIC MYELOCYTIC LEUKEMIA) (HCC): Chronic | Status: ACTIVE | Noted: 2018-04-21

## 2018-04-21 PROBLEM — E78.5 HYPERLIPIDEMIA: Chronic | Status: ACTIVE | Noted: 2018-04-21

## 2018-04-21 LAB
ANION GAP SERPL CALCULATED.3IONS-SCNC: 7 MMOL/L (ref 7–19)
BUN BLDV-MCNC: 6 MG/DL (ref 8–23)
CALCIUM SERPL-MCNC: 9 MG/DL (ref 8.8–10.2)
CHLORIDE BLD-SCNC: 96 MMOL/L (ref 98–111)
CO2: 33 MMOL/L (ref 22–29)
CREAT SERPL-MCNC: 0.8 MG/DL (ref 0.5–1.2)
GFR NON-AFRICAN AMERICAN: >60
GLUCOSE BLD-MCNC: 124 MG/DL (ref 74–109)
HCT VFR BLD CALC: 31.6 % (ref 42–52)
HEMOGLOBIN: 10.3 G/DL (ref 14–18)
POTASSIUM REFLEX MAGNESIUM: 3.8 MMOL/L (ref 3.5–5)
SODIUM BLD-SCNC: 136 MMOL/L (ref 136–145)

## 2018-04-21 PROCEDURE — 85018 HEMOGLOBIN: CPT

## 2018-04-21 PROCEDURE — 6370000000 HC RX 637 (ALT 250 FOR IP): Performed by: ORTHOPAEDIC SURGERY

## 2018-04-21 PROCEDURE — 85014 HEMATOCRIT: CPT

## 2018-04-21 PROCEDURE — 2580000003 HC RX 258: Performed by: ORTHOPAEDIC SURGERY

## 2018-04-21 PROCEDURE — 6360000002 HC RX W HCPCS: Performed by: ORTHOPAEDIC SURGERY

## 2018-04-21 PROCEDURE — 80048 BASIC METABOLIC PNL TOTAL CA: CPT

## 2018-04-21 PROCEDURE — 1210000000 HC MED SURG R&B

## 2018-04-21 PROCEDURE — 36415 COLL VENOUS BLD VENIPUNCTURE: CPT

## 2018-04-21 RX ADMIN — OXYCODONE HYDROCHLORIDE 20 MG: 20 TABLET, FILM COATED, EXTENDED RELEASE ORAL at 08:15

## 2018-04-21 RX ADMIN — TRAMADOL HYDROCHLORIDE 50 MG: 50 TABLET, FILM COATED ORAL at 08:15

## 2018-04-21 RX ADMIN — TAMSULOSIN HYDROCHLORIDE 0.4 MG: 0.4 CAPSULE ORAL at 20:22

## 2018-04-21 RX ADMIN — DOCUSATE SODIUM 100 MG: 100 CAPSULE, LIQUID FILLED ORAL at 08:15

## 2018-04-21 RX ADMIN — PANTOPRAZOLE SODIUM 40 MG: 40 TABLET, DELAYED RELEASE ORAL at 06:06

## 2018-04-21 RX ADMIN — OXYCODONE HYDROCHLORIDE 20 MG: 5 TABLET ORAL at 08:15

## 2018-04-21 RX ADMIN — Medication 2 G: at 14:56

## 2018-04-21 RX ADMIN — TRAMADOL HYDROCHLORIDE 50 MG: 50 TABLET, FILM COATED ORAL at 14:56

## 2018-04-21 RX ADMIN — DIAZEPAM 5 MG: 5 TABLET ORAL at 11:57

## 2018-04-21 RX ADMIN — DOCUSATE SODIUM 100 MG: 100 CAPSULE, LIQUID FILLED ORAL at 20:22

## 2018-04-21 RX ADMIN — ACETAMINOPHEN 1000 MG: 500 TABLET, FILM COATED ORAL at 14:56

## 2018-04-21 RX ADMIN — ROPINIROLE HYDROCHLORIDE 0.25 MG: 0.25 TABLET, FILM COATED ORAL at 20:23

## 2018-04-21 RX ADMIN — CITALOPRAM HYDROBROMIDE 40 MG: 40 TABLET ORAL at 08:15

## 2018-04-21 RX ADMIN — OXYCODONE HYDROCHLORIDE 10 MG: 5 TABLET ORAL at 23:45

## 2018-04-21 RX ADMIN — DIAZEPAM 5 MG: 5 TABLET ORAL at 18:45

## 2018-04-21 RX ADMIN — OXYCODONE HYDROCHLORIDE 10 MG: 5 TABLET ORAL at 03:28

## 2018-04-21 RX ADMIN — Medication 2 G: at 03:27

## 2018-04-21 RX ADMIN — ACETAMINOPHEN 1000 MG: 500 TABLET, FILM COATED ORAL at 06:06

## 2018-04-21 RX ADMIN — OXYCODONE HYDROCHLORIDE 20 MG: 20 TABLET, FILM COATED, EXTENDED RELEASE ORAL at 20:23

## 2018-04-21 RX ADMIN — RIVAROXABAN 10 MG: 10 TABLET, FILM COATED ORAL at 08:15

## 2018-04-21 RX ADMIN — OXYCODONE HYDROCHLORIDE 20 MG: 5 TABLET ORAL at 11:57

## 2018-04-21 RX ADMIN — OXYCODONE HYDROCHLORIDE 20 MG: 5 TABLET ORAL at 18:45

## 2018-04-21 RX ADMIN — TRAMADOL HYDROCHLORIDE 50 MG: 50 TABLET, FILM COATED ORAL at 03:27

## 2018-04-21 ASSESSMENT — PAIN SCALES - GENERAL
PAINLEVEL_OUTOF10: 6
PAINLEVEL_OUTOF10: 0
PAINLEVEL_OUTOF10: 3
PAINLEVEL_OUTOF10: 8
PAINLEVEL_OUTOF10: 7
PAINLEVEL_OUTOF10: 4
PAINLEVEL_OUTOF10: 1
PAINLEVEL_OUTOF10: 7
PAINLEVEL_OUTOF10: 6

## 2018-04-22 LAB
ANAEROBIC CULTURE: NORMAL
CULTURE SURGICAL: NORMAL
GRAM STAIN RESULT: NORMAL

## 2018-04-22 PROCEDURE — 1210000000 HC MED SURG R&B

## 2018-04-22 PROCEDURE — 2580000003 HC RX 258: Performed by: ORTHOPAEDIC SURGERY

## 2018-04-22 PROCEDURE — 6370000000 HC RX 637 (ALT 250 FOR IP): Performed by: ORTHOPAEDIC SURGERY

## 2018-04-22 RX ORDER — POLYETHYLENE GLYCOL 3350 17 G/17G
17 POWDER, FOR SOLUTION ORAL DAILY PRN
Status: DISCONTINUED | OUTPATIENT
Start: 2018-04-22 | End: 2018-04-23 | Stop reason: HOSPADM

## 2018-04-22 RX ADMIN — TRAMADOL HYDROCHLORIDE 50 MG: 50 TABLET, FILM COATED ORAL at 08:21

## 2018-04-22 RX ADMIN — POLYETHYLENE GLYCOL 3350 17 G: 17 POWDER, FOR SOLUTION ORAL at 16:47

## 2018-04-22 RX ADMIN — DIAZEPAM 5 MG: 5 TABLET ORAL at 08:22

## 2018-04-22 RX ADMIN — OXYCODONE HYDROCHLORIDE 20 MG: 5 TABLET ORAL at 15:31

## 2018-04-22 RX ADMIN — OXYCODONE HYDROCHLORIDE 20 MG: 5 TABLET ORAL at 04:08

## 2018-04-22 RX ADMIN — DOCUSATE SODIUM 100 MG: 100 CAPSULE, LIQUID FILLED ORAL at 20:58

## 2018-04-22 RX ADMIN — Medication 10 ML: at 21:12

## 2018-04-22 RX ADMIN — ACETAMINOPHEN 1000 MG: 500 TABLET, FILM COATED ORAL at 06:12

## 2018-04-22 RX ADMIN — RIVAROXABAN 10 MG: 10 TABLET, FILM COATED ORAL at 08:22

## 2018-04-22 RX ADMIN — OXYCODONE HYDROCHLORIDE 20 MG: 20 TABLET, FILM COATED, EXTENDED RELEASE ORAL at 08:22

## 2018-04-22 RX ADMIN — DOCUSATE SODIUM 100 MG: 100 CAPSULE, LIQUID FILLED ORAL at 08:22

## 2018-04-22 RX ADMIN — OXYCODONE HYDROCHLORIDE 20 MG: 20 TABLET, FILM COATED, EXTENDED RELEASE ORAL at 20:58

## 2018-04-22 RX ADMIN — ROPINIROLE HYDROCHLORIDE 0.25 MG: 0.25 TABLET, FILM COATED ORAL at 20:58

## 2018-04-22 RX ADMIN — PANTOPRAZOLE SODIUM 40 MG: 40 TABLET, DELAYED RELEASE ORAL at 06:12

## 2018-04-22 RX ADMIN — CITALOPRAM HYDROBROMIDE 40 MG: 40 TABLET ORAL at 08:21

## 2018-04-22 RX ADMIN — TRAMADOL HYDROCHLORIDE 50 MG: 50 TABLET, FILM COATED ORAL at 20:58

## 2018-04-22 RX ADMIN — ACETAMINOPHEN 1000 MG: 500 TABLET, FILM COATED ORAL at 20:59

## 2018-04-22 RX ADMIN — ACETAMINOPHEN 1000 MG: 500 TABLET, FILM COATED ORAL at 15:31

## 2018-04-22 RX ADMIN — OXYCODONE HYDROCHLORIDE 20 MG: 5 TABLET ORAL at 08:21

## 2018-04-22 RX ADMIN — TRAMADOL HYDROCHLORIDE 50 MG: 50 TABLET, FILM COATED ORAL at 15:31

## 2018-04-22 RX ADMIN — TAMSULOSIN HYDROCHLORIDE 0.4 MG: 0.4 CAPSULE ORAL at 20:58

## 2018-04-22 ASSESSMENT — PAIN SCALES - GENERAL
PAINLEVEL_OUTOF10: 8
PAINLEVEL_OUTOF10: 8
PAINLEVEL_OUTOF10: 3
PAINLEVEL_OUTOF10: 5
PAINLEVEL_OUTOF10: 7

## 2018-04-23 VITALS
HEIGHT: 65 IN | SYSTOLIC BLOOD PRESSURE: 115 MMHG | DIASTOLIC BLOOD PRESSURE: 79 MMHG | HEART RATE: 100 BPM | TEMPERATURE: 96.9 F | BODY MASS INDEX: 26.16 KG/M2 | WEIGHT: 157 LBS | RESPIRATION RATE: 14 BRPM | OXYGEN SATURATION: 96 %

## 2018-04-23 PROCEDURE — 6370000000 HC RX 637 (ALT 250 FOR IP): Performed by: ORTHOPAEDIC SURGERY

## 2018-04-23 RX ADMIN — FLUTICASONE PROPIONATE 1 SPRAY: 50 SPRAY, METERED NASAL at 09:29

## 2018-04-23 RX ADMIN — ACETAMINOPHEN 1000 MG: 500 TABLET, FILM COATED ORAL at 06:19

## 2018-04-23 RX ADMIN — PANTOPRAZOLE SODIUM 40 MG: 40 TABLET, DELAYED RELEASE ORAL at 06:18

## 2018-04-23 RX ADMIN — OXYCODONE HYDROCHLORIDE 20 MG: 20 TABLET, FILM COATED, EXTENDED RELEASE ORAL at 09:29

## 2018-04-23 RX ADMIN — CITALOPRAM HYDROBROMIDE 40 MG: 40 TABLET ORAL at 09:29

## 2018-04-23 RX ADMIN — TRAMADOL HYDROCHLORIDE 50 MG: 50 TABLET, FILM COATED ORAL at 09:29

## 2018-04-23 RX ADMIN — RIVAROXABAN 10 MG: 10 TABLET, FILM COATED ORAL at 09:29

## 2018-04-23 RX ADMIN — TRAMADOL HYDROCHLORIDE 50 MG: 50 TABLET, FILM COATED ORAL at 04:37

## 2018-04-23 RX ADMIN — OXYCODONE HYDROCHLORIDE 10 MG: 5 TABLET ORAL at 04:38

## 2018-04-23 RX ADMIN — DOCUSATE SODIUM 100 MG: 100 CAPSULE, LIQUID FILLED ORAL at 09:29

## 2018-04-23 ASSESSMENT — ENCOUNTER SYMPTOMS
DIARRHEA: 0
SHORTNESS OF BREATH: 0
COUGH: 0

## 2018-04-23 ASSESSMENT — PAIN SCALES - GENERAL
PAINLEVEL_OUTOF10: 6
PAINLEVEL_OUTOF10: 6
PAINLEVEL_OUTOF10: 7

## 2018-05-04 ENCOUNTER — TELEPHONE (OUTPATIENT)
Dept: PAIN MANAGEMENT | Age: 63
End: 2018-05-04

## 2018-05-09 ENCOUNTER — ANESTHESIA EVENT (OUTPATIENT)
Dept: OPERATING ROOM | Age: 63
End: 2018-05-09
Payer: MEDICARE

## 2018-05-09 ENCOUNTER — ANESTHESIA (OUTPATIENT)
Dept: OPERATING ROOM | Age: 63
End: 2018-05-09
Payer: MEDICARE

## 2018-05-09 ENCOUNTER — HOSPITAL ENCOUNTER (OUTPATIENT)
Age: 63
Setting detail: OUTPATIENT SURGERY
Discharge: HOME | End: 2018-05-09
Attending: ORTHOPAEDIC SURGERY | Admitting: ORTHOPAEDIC SURGERY
Payer: MEDICARE

## 2018-05-09 VITALS
OXYGEN SATURATION: 98 % | BODY MASS INDEX: 26.66 KG/M2 | HEIGHT: 65 IN | HEART RATE: 88 BPM | DIASTOLIC BLOOD PRESSURE: 90 MMHG | RESPIRATION RATE: 16 BRPM | SYSTOLIC BLOOD PRESSURE: 142 MMHG | TEMPERATURE: 98.5 F | WEIGHT: 160 LBS

## 2018-05-09 VITALS
OXYGEN SATURATION: 98 % | SYSTOLIC BLOOD PRESSURE: 134 MMHG | RESPIRATION RATE: 15 BRPM | DIASTOLIC BLOOD PRESSURE: 79 MMHG

## 2018-05-09 PROCEDURE — 2500000003 HC RX 250 WO HCPCS: Performed by: ORTHOPAEDIC SURGERY

## 2018-05-09 PROCEDURE — 6360000002 HC RX W HCPCS: Performed by: NURSE ANESTHETIST, CERTIFIED REGISTERED

## 2018-05-09 PROCEDURE — 7100000010 HC PHASE II RECOVERY - FIRST 15 MIN: Performed by: ORTHOPAEDIC SURGERY

## 2018-05-09 PROCEDURE — 7100000001 HC PACU RECOVERY - ADDTL 15 MIN: Performed by: ORTHOPAEDIC SURGERY

## 2018-05-09 PROCEDURE — 7100000000 HC PACU RECOVERY - FIRST 15 MIN: Performed by: ORTHOPAEDIC SURGERY

## 2018-05-09 PROCEDURE — 6370000000 HC RX 637 (ALT 250 FOR IP): Performed by: ORTHOPAEDIC SURGERY

## 2018-05-09 PROCEDURE — 7100000011 HC PHASE II RECOVERY - ADDTL 15 MIN: Performed by: ORTHOPAEDIC SURGERY

## 2018-05-09 PROCEDURE — 2500000003 HC RX 250 WO HCPCS: Performed by: NURSE ANESTHETIST, CERTIFIED REGISTERED

## 2018-05-09 PROCEDURE — 3700000000 HC ANESTHESIA ATTENDED CARE: Performed by: ORTHOPAEDIC SURGERY

## 2018-05-09 PROCEDURE — 6360000002 HC RX W HCPCS: Performed by: ORTHOPAEDIC SURGERY

## 2018-05-09 PROCEDURE — 3600000100 HC MANIPULATE KNEE UNDER ANESTH: Performed by: ORTHOPAEDIC SURGERY

## 2018-05-09 PROCEDURE — C9290 INJ, BUPIVACAINE LIPOSOME: HCPCS | Performed by: ORTHOPAEDIC SURGERY

## 2018-05-09 PROCEDURE — 3700000001 HC ADD 15 MINUTES (ANESTHESIA): Performed by: ORTHOPAEDIC SURGERY

## 2018-05-09 PROCEDURE — 2580000003 HC RX 258: Performed by: ORTHOPAEDIC SURGERY

## 2018-05-09 PROCEDURE — 2580000003 HC RX 258: Performed by: NURSE ANESTHETIST, CERTIFIED REGISTERED

## 2018-05-09 RX ORDER — SODIUM CHLORIDE 0.9 % (FLUSH) 0.9 %
10 SYRINGE (ML) INJECTION PRN
Status: DISCONTINUED | OUTPATIENT
Start: 2018-05-09 | End: 2018-05-09 | Stop reason: HOSPADM

## 2018-05-09 RX ORDER — HYDROMORPHONE HCL IN 0.9% NACL 0.5 MG/ML
0.25 SYRINGE (ML) INTRAVENOUS EVERY 5 MIN PRN
Status: DISCONTINUED | OUTPATIENT
Start: 2018-05-09 | End: 2018-05-09 | Stop reason: HOSPADM

## 2018-05-09 RX ORDER — CEFAZOLIN SODIUM 1 G/3ML
INJECTION, POWDER, FOR SOLUTION INTRAMUSCULAR; INTRAVENOUS PRN
Status: DISCONTINUED | OUTPATIENT
Start: 2018-05-09 | End: 2018-05-09 | Stop reason: SDUPTHER

## 2018-05-09 RX ORDER — MORPHINE SULFATE 4 MG/ML
4 INJECTION, SOLUTION INTRAMUSCULAR; INTRAVENOUS EVERY 5 MIN PRN
Status: DISCONTINUED | OUTPATIENT
Start: 2018-05-09 | End: 2018-05-09 | Stop reason: HOSPADM

## 2018-05-09 RX ORDER — LABETALOL HYDROCHLORIDE 5 MG/ML
5 INJECTION, SOLUTION INTRAVENOUS EVERY 10 MIN PRN
Status: DISCONTINUED | OUTPATIENT
Start: 2018-05-09 | End: 2018-05-09 | Stop reason: HOSPADM

## 2018-05-09 RX ORDER — LIDOCAINE HYDROCHLORIDE 10 MG/ML
INJECTION, SOLUTION INFILTRATION; PERINEURAL PRN
Status: DISCONTINUED | OUTPATIENT
Start: 2018-05-09 | End: 2018-05-09 | Stop reason: SDUPTHER

## 2018-05-09 RX ORDER — FENTANYL CITRATE 50 UG/ML
25 INJECTION, SOLUTION INTRAMUSCULAR; INTRAVENOUS
Status: DISCONTINUED | OUTPATIENT
Start: 2018-05-09 | End: 2018-05-09 | Stop reason: HOSPADM

## 2018-05-09 RX ORDER — LIDOCAINE HYDROCHLORIDE 10 MG/ML
1 INJECTION, SOLUTION EPIDURAL; INFILTRATION; INTRACAUDAL; PERINEURAL
Status: DISCONTINUED | OUTPATIENT
Start: 2018-05-09 | End: 2018-05-09 | Stop reason: HOSPADM

## 2018-05-09 RX ORDER — SODIUM CHLORIDE, SODIUM LACTATE, POTASSIUM CHLORIDE, CALCIUM CHLORIDE 600; 310; 30; 20 MG/100ML; MG/100ML; MG/100ML; MG/100ML
INJECTION, SOLUTION INTRAVENOUS CONTINUOUS PRN
Status: DISCONTINUED | OUTPATIENT
Start: 2018-05-09 | End: 2018-05-09 | Stop reason: SDUPTHER

## 2018-05-09 RX ORDER — DIPHENHYDRAMINE HYDROCHLORIDE 50 MG/ML
12.5 INJECTION INTRAMUSCULAR; INTRAVENOUS
Status: DISCONTINUED | OUTPATIENT
Start: 2018-05-09 | End: 2018-05-09 | Stop reason: HOSPADM

## 2018-05-09 RX ORDER — MIDAZOLAM HYDROCHLORIDE 1 MG/ML
INJECTION INTRAMUSCULAR; INTRAVENOUS PRN
Status: DISCONTINUED | OUTPATIENT
Start: 2018-05-09 | End: 2018-05-09 | Stop reason: SDUPTHER

## 2018-05-09 RX ORDER — HYDRALAZINE HYDROCHLORIDE 20 MG/ML
5 INJECTION INTRAMUSCULAR; INTRAVENOUS EVERY 10 MIN PRN
Status: DISCONTINUED | OUTPATIENT
Start: 2018-05-09 | End: 2018-05-09 | Stop reason: HOSPADM

## 2018-05-09 RX ORDER — PROPOFOL 10 MG/ML
INJECTION, EMULSION INTRAVENOUS PRN
Status: DISCONTINUED | OUTPATIENT
Start: 2018-05-09 | End: 2018-05-09 | Stop reason: SDUPTHER

## 2018-05-09 RX ORDER — SODIUM CHLORIDE, SODIUM LACTATE, POTASSIUM CHLORIDE, CALCIUM CHLORIDE 600; 310; 30; 20 MG/100ML; MG/100ML; MG/100ML; MG/100ML
INJECTION, SOLUTION INTRAVENOUS CONTINUOUS
Status: DISCONTINUED | OUTPATIENT
Start: 2018-05-09 | End: 2018-05-09 | Stop reason: HOSPADM

## 2018-05-09 RX ORDER — MORPHINE SULFATE 4 MG/ML
2 INJECTION, SOLUTION INTRAMUSCULAR; INTRAVENOUS EVERY 5 MIN PRN
Status: DISCONTINUED | OUTPATIENT
Start: 2018-05-09 | End: 2018-05-09 | Stop reason: HOSPADM

## 2018-05-09 RX ORDER — PROMETHAZINE HYDROCHLORIDE 25 MG/ML
6.25 INJECTION, SOLUTION INTRAMUSCULAR; INTRAVENOUS
Status: DISCONTINUED | OUTPATIENT
Start: 2018-05-09 | End: 2018-05-09 | Stop reason: HOSPADM

## 2018-05-09 RX ORDER — MEPERIDINE HYDROCHLORIDE 50 MG/ML
12.5 INJECTION INTRAMUSCULAR; INTRAVENOUS; SUBCUTANEOUS EVERY 5 MIN PRN
Status: DISCONTINUED | OUTPATIENT
Start: 2018-05-09 | End: 2018-05-09 | Stop reason: HOSPADM

## 2018-05-09 RX ORDER — HYDROMORPHONE HCL IN 0.9% NACL 0.5 MG/ML
0.5 SYRINGE (ML) INTRAVENOUS EVERY 5 MIN PRN
Status: DISCONTINUED | OUTPATIENT
Start: 2018-05-09 | End: 2018-05-09 | Stop reason: HOSPADM

## 2018-05-09 RX ORDER — FENTANYL CITRATE 50 UG/ML
INJECTION, SOLUTION INTRAMUSCULAR; INTRAVENOUS PRN
Status: DISCONTINUED | OUTPATIENT
Start: 2018-05-09 | End: 2018-05-09 | Stop reason: SDUPTHER

## 2018-05-09 RX ORDER — FENTANYL CITRATE 50 UG/ML
50 INJECTION, SOLUTION INTRAMUSCULAR; INTRAVENOUS
Status: DISCONTINUED | OUTPATIENT
Start: 2018-05-09 | End: 2018-05-09 | Stop reason: HOSPADM

## 2018-05-09 RX ORDER — SODIUM CHLORIDE 0.9 % (FLUSH) 0.9 %
10 SYRINGE (ML) INJECTION EVERY 12 HOURS SCHEDULED
Status: DISCONTINUED | OUTPATIENT
Start: 2018-05-09 | End: 2018-05-09 | Stop reason: HOSPADM

## 2018-05-09 RX ORDER — ONDANSETRON 2 MG/ML
INJECTION INTRAMUSCULAR; INTRAVENOUS PRN
Status: DISCONTINUED | OUTPATIENT
Start: 2018-05-09 | End: 2018-05-09 | Stop reason: SDUPTHER

## 2018-05-09 RX ORDER — ENALAPRILAT 2.5 MG/2ML
1.25 INJECTION INTRAVENOUS
Status: DISCONTINUED | OUTPATIENT
Start: 2018-05-09 | End: 2018-05-09 | Stop reason: HOSPADM

## 2018-05-09 RX ORDER — DEXAMETHASONE SODIUM PHOSPHATE 10 MG/ML
INJECTION INTRAMUSCULAR; INTRAVENOUS PRN
Status: DISCONTINUED | OUTPATIENT
Start: 2018-05-09 | End: 2018-05-09 | Stop reason: SDUPTHER

## 2018-05-09 RX ORDER — KETOROLAC TROMETHAMINE 30 MG/ML
INJECTION, SOLUTION INTRAMUSCULAR; INTRAVENOUS PRN
Status: DISCONTINUED | OUTPATIENT
Start: 2018-05-09 | End: 2018-05-09 | Stop reason: SDUPTHER

## 2018-05-09 RX ORDER — OXYCODONE HYDROCHLORIDE 5 MG/1
10 TABLET ORAL ONCE
Status: COMPLETED | OUTPATIENT
Start: 2018-05-09 | End: 2018-05-09

## 2018-05-09 RX ORDER — MIDAZOLAM HYDROCHLORIDE 1 MG/ML
2 INJECTION INTRAMUSCULAR; INTRAVENOUS
Status: DISCONTINUED | OUTPATIENT
Start: 2018-05-09 | End: 2018-05-09 | Stop reason: HOSPADM

## 2018-05-09 RX ORDER — METOCLOPRAMIDE HYDROCHLORIDE 5 MG/ML
10 INJECTION INTRAMUSCULAR; INTRAVENOUS
Status: DISCONTINUED | OUTPATIENT
Start: 2018-05-09 | End: 2018-05-09 | Stop reason: HOSPADM

## 2018-05-09 RX ORDER — OXYCODONE HYDROCHLORIDE 10 MG/1
TABLET ORAL
Qty: 70 TABLET | Refills: 0 | Status: SHIPPED | OUTPATIENT
Start: 2018-05-09 | End: 2018-06-09

## 2018-05-09 RX ADMIN — CEFAZOLIN 1000 MG: 1 INJECTION, POWDER, FOR SOLUTION INTRAMUSCULAR; INTRAVENOUS; PARENTERAL at 07:33

## 2018-05-09 RX ADMIN — PROPOFOL 80 MG: 10 INJECTION, EMULSION INTRAVENOUS at 07:16

## 2018-05-09 RX ADMIN — PROPOFOL 30 MG: 10 INJECTION, EMULSION INTRAVENOUS at 07:18

## 2018-05-09 RX ADMIN — DEXAMETHASONE SODIUM PHOSPHATE 10 MG: 10 INJECTION INTRAMUSCULAR; INTRAVENOUS at 07:18

## 2018-05-09 RX ADMIN — Medication 0.5 MG: at 07:44

## 2018-05-09 RX ADMIN — MIDAZOLAM HYDROCHLORIDE 2 MG: 1 INJECTION, SOLUTION INTRAMUSCULAR; INTRAVENOUS at 07:15

## 2018-05-09 RX ADMIN — FENTANYL CITRATE 25 MCG: 50 INJECTION, SOLUTION INTRAMUSCULAR; INTRAVENOUS at 07:17

## 2018-05-09 RX ADMIN — SODIUM CHLORIDE, POTASSIUM CHLORIDE, SODIUM LACTATE AND CALCIUM CHLORIDE: 600; 310; 30; 20 INJECTION, SOLUTION INTRAVENOUS at 06:02

## 2018-05-09 RX ADMIN — OXYCODONE HYDROCHLORIDE 10 MG: 5 TABLET ORAL at 08:33

## 2018-05-09 RX ADMIN — LIDOCAINE HYDROCHLORIDE 50 MG: 10 INJECTION, SOLUTION INFILTRATION; PERINEURAL at 07:16

## 2018-05-09 RX ADMIN — PHENYLEPHRINE HYDROCHLORIDE 80 MCG: 10 INJECTION INTRAVENOUS at 07:21

## 2018-05-09 RX ADMIN — Medication 0.5 MG: at 08:00

## 2018-05-09 RX ADMIN — FENTANYL CITRATE 25 MCG: 50 INJECTION, SOLUTION INTRAMUSCULAR; INTRAVENOUS at 07:15

## 2018-05-09 RX ADMIN — SODIUM CHLORIDE, SODIUM LACTATE, POTASSIUM CHLORIDE, AND CALCIUM CHLORIDE: 600; 310; 30; 20 INJECTION, SOLUTION INTRAVENOUS at 07:15

## 2018-05-09 RX ADMIN — HYDROMORPHONE HYDROCHLORIDE 0.5 MG: 1 INJECTION, SOLUTION INTRAMUSCULAR; INTRAVENOUS; SUBCUTANEOUS at 07:17

## 2018-05-09 RX ADMIN — KETOROLAC TROMETHAMINE 30 MG: 30 INJECTION, SOLUTION INTRAMUSCULAR at 07:18

## 2018-05-09 RX ADMIN — ONDANSETRON HYDROCHLORIDE 4 MG: 2 SOLUTION INTRAMUSCULAR; INTRAVENOUS at 07:18

## 2018-05-09 ASSESSMENT — PAIN DESCRIPTION - PAIN TYPE: TYPE: SURGICAL PAIN

## 2018-05-09 ASSESSMENT — PAIN DESCRIPTION - ORIENTATION
ORIENTATION: LEFT
ORIENTATION: LEFT

## 2018-05-09 ASSESSMENT — PAIN DESCRIPTION - LOCATION
LOCATION: KNEE
LOCATION: KNEE

## 2018-05-09 ASSESSMENT — PAIN DESCRIPTION - FREQUENCY
FREQUENCY: CONTINUOUS
FREQUENCY: CONTINUOUS

## 2018-05-09 ASSESSMENT — PAIN DESCRIPTION - DESCRIPTORS
DESCRIPTORS: ACHING;BURNING
DESCRIPTORS: BURNING

## 2018-05-09 ASSESSMENT — PAIN DESCRIPTION - ONSET: ONSET: AWAKENED FROM SLEEP

## 2018-05-09 ASSESSMENT — LIFESTYLE VARIABLES: SMOKING_STATUS: 0

## 2018-05-09 ASSESSMENT — PAIN SCALES - GENERAL
PAINLEVEL_OUTOF10: 0
PAINLEVEL_OUTOF10: 8
PAINLEVEL_OUTOF10: 9
PAINLEVEL_OUTOF10: 6
PAINLEVEL_OUTOF10: 8

## 2018-05-15 ENCOUNTER — TELEPHONE (OUTPATIENT)
Dept: INPATIENT UNIT | Age: 63
End: 2018-05-15

## 2018-05-18 ENCOUNTER — TELEPHONE (OUTPATIENT)
Dept: PAIN MANAGEMENT | Age: 63
End: 2018-05-18

## 2018-07-11 ENCOUNTER — TELEPHONE (OUTPATIENT)
Dept: PAIN MANAGEMENT | Age: 63
End: 2018-07-11

## 2018-07-12 ENCOUNTER — HOSPITAL ENCOUNTER (OUTPATIENT)
Dept: PAIN MANAGEMENT | Age: 63
Discharge: HOME | End: 2018-07-12
Payer: MEDICARE

## 2018-07-12 VITALS
RESPIRATION RATE: 18 BRPM | BODY MASS INDEX: 26.29 KG/M2 | DIASTOLIC BLOOD PRESSURE: 92 MMHG | HEIGHT: 64 IN | SYSTOLIC BLOOD PRESSURE: 143 MMHG | OXYGEN SATURATION: 99 % | TEMPERATURE: 97.4 F | WEIGHT: 154 LBS | HEART RATE: 68 BPM

## 2018-07-12 DIAGNOSIS — M54.41 CHRONIC BILATERAL LOW BACK PAIN WITH RIGHT-SIDED SCIATICA: ICD-10-CM

## 2018-07-12 DIAGNOSIS — M54.42 CHRONIC MIDLINE LOW BACK PAIN WITH BILATERAL SCIATICA: ICD-10-CM

## 2018-07-12 DIAGNOSIS — G89.29 CHRONIC BILATERAL LOW BACK PAIN WITH RIGHT-SIDED SCIATICA: ICD-10-CM

## 2018-07-12 DIAGNOSIS — G89.29 CHRONIC MIDLINE LOW BACK PAIN WITH BILATERAL SCIATICA: ICD-10-CM

## 2018-07-12 DIAGNOSIS — M79.18 MYOFASCIAL PAIN: ICD-10-CM

## 2018-07-12 DIAGNOSIS — M47.816 LUMBAR FACET ARTHROPATHY: ICD-10-CM

## 2018-07-12 DIAGNOSIS — M54.2 CERVICALGIA: ICD-10-CM

## 2018-07-12 DIAGNOSIS — M54.41 CHRONIC MIDLINE LOW BACK PAIN WITH BILATERAL SCIATICA: ICD-10-CM

## 2018-07-12 DIAGNOSIS — M54.16 LUMBAR RADICULOPATHY: Primary | ICD-10-CM

## 2018-07-12 DIAGNOSIS — M79.18 MYOFASCIAL PAIN SYNDROME: ICD-10-CM

## 2018-07-12 PROCEDURE — 99213 OFFICE O/P EST LOW 20 MIN: CPT

## 2018-07-12 PROCEDURE — 99214 OFFICE O/P EST MOD 30 MIN: CPT | Performed by: NURSE PRACTITIONER

## 2018-07-12 RX ORDER — OXYCODONE AND ACETAMINOPHEN 10; 325 MG/1; MG/1
1 TABLET ORAL EVERY 6 HOURS PRN
Qty: 120 TABLET | Refills: 0 | Status: SHIPPED | OUTPATIENT
Start: 2018-07-12 | End: 2018-08-11

## 2018-07-12 ASSESSMENT — ENCOUNTER SYMPTOMS
SHORTNESS OF BREATH: 0
DOUBLE VISION: 0
BLURRED VISION: 0
BACK PAIN: 1
SORE THROAT: 0
WHEEZING: 0
CONSTIPATION: 0

## 2018-07-12 ASSESSMENT — PAIN DESCRIPTION - LOCATION: LOCATION: BACK;NECK

## 2018-07-12 ASSESSMENT — PAIN DESCRIPTION - PAIN TYPE: TYPE: CHRONIC PAIN

## 2018-07-12 ASSESSMENT — PAIN DESCRIPTION - ONSET: ONSET: ON-GOING

## 2018-07-12 ASSESSMENT — PAIN DESCRIPTION - DESCRIPTORS: DESCRIPTORS: CONSTANT;ACHING;THROBBING;RADIATING

## 2018-07-12 ASSESSMENT — PAIN DESCRIPTION - DIRECTION: RADIATING_TOWARDS: INTO LEFT HIP

## 2018-07-12 ASSESSMENT — PAIN DESCRIPTION - ORIENTATION: ORIENTATION: UPPER;LOWER

## 2018-07-12 ASSESSMENT — PAIN DESCRIPTION - FREQUENCY: FREQUENCY: CONTINUOUS

## 2018-07-12 ASSESSMENT — PAIN SCALES - GENERAL: PAINLEVEL_OUTOF10: 7

## 2018-07-12 ASSESSMENT — PAIN DESCRIPTION - PROGRESSION: CLINICAL_PROGRESSION: NOT CHANGED

## 2018-07-12 ASSESSMENT — ACTIVITIES OF DAILY LIVING (ADL): EFFECT OF PAIN ON DAILY ACTIVITIES: LIMITS ACTIVITY

## 2018-07-12 NOTE — PROGRESS NOTES
The pain is present in the midline. The quality of the pain is described as aching and burning. The pain is at a severity of 6/10. The pain is mild. The symptoms are aggravated by twisting and bending. The pain is same all the time. Stiffness is present in the morning. Associated symptoms include leg pain. Pertinent negatives include no chest pain or fever. He has tried bed rest, heat, home exercises, ice and NSAIDs for the symptoms. The treatment provided no relief. Current Pain Assement  Pain Assessment  Pain Assessment: 0-10  Pain Level: 7  Pain Type: Chronic pain  Pain Location: Back, Neck  Pain Orientation: Upper, Lower  Pain Radiating Towards: into left hip  Pain Descriptors: Constant, Aching, Throbbing, Radiating  Pain Frequency: Continuous  Pain Onset: On-going  Clinical Progression: Not changed  Effect of Pain on Daily Activities: limits activity  Patient's Stated Pain Goal: No pain  Pain Intervention(s): Medication (see eMar), Repositioned, Rest  Response to Pain Intervention: None  Multiple Pain Sites: Yes  POSS Score (Patient Ctrl Analgesia): 1          Previous Physical Therapy In the last 6 months? Yes [x]  No []  Did Physical Therapy make the pain better or worse? Worse  []   Better []      Injections in the past? Yes [x]  No [] patient reports cervical and lumbar trigger points injections have been more effective in the past, would like to discuss lumbar epidural or facet injections in future visits. Did the injections help relieve the pain?  Yes [x]  No []    Past Medical Histoy  Past Medical History:   Diagnosis Date    Arthritis     Bilateral low back pain with right-sided sciatica 12/18/2015    Cancer ([facility] [address]     cml    Hepatitis     c; remission after treatment    Hyperlipidemia 4/21/2018    MDRO (multiple drug resistant organisms) resistance     MRSA (methicillin resistant staph aureus) culture positive     bump in nose       Surgery History  Past Surgical History:   Procedure Laterality Date    BACK SURGERY      CERVICAL SPINE SURGERY      COLONOSCOPY      JOINT MANIPULATION Left 5/9/2018    LEG CAST REMOVAL KNEE MANIPULATION WITH KNEE INJECTION EXAM UNDER ANESTHESIA performed by Odilia Alanis MD at [address]      ltk/wbh/dr mccall    AL REVISE KNEE JOINT REPLACE,ALL PARTS Left 4/18/2018    KNEE TOTAL ARTHROPLASTY REVISION performed by Odilia Alanis MD at [address] History  family history includes Cancer in his father; Heart Failure in his mother. Social History    Social History   Substance Use Topics    Smoking status: Former Smoker     Quit date: 12/18/1975    Smokeless tobacco: Never Used    Alcohol use Yes      Comment: occasionally       Allergies  Patient has no known allergies. Current Medications  Current Outpatient Prescriptions   Medication Sig Dispense Refill    oxyCODONE-acetaminophen (PERCOCET)  MG per tablet Take 1 tablet by mouth every 6 hours as needed for Pain for up to 30 days. Atlee Alysa Date: 7/12/18 120 tablet 0    rOPINIRole (REQUIP) 0.25 MG tablet Take 0.25 mg by mouth nightly      citalopram (CELEXA) 40 MG tablet Take 40 mg by mouth daily      omeprazole (PRILOSEC) 40 MG capsule Take 40 mg by mouth daily      GLEEVEC 100 MG chemo tablet Take 300 mg by mouth nightly       fluticasone (FLONASE) 50 MCG/ACT nasal spray 1 spray by Nasal route 2 times daily       No current facility-administered medications for this encounter. Review of Systems:  Review of Systems   Constitutional: Negative for chills and fever. HENT: Negative for ear discharge and sore throat. Eyes: Negative for blurred vision and double vision. Respiratory: Negative for shortness of breath and wheezing. Cardiovascular: Negative for chest pain. Gastrointestinal: Negative for constipation. Genitourinary: Negative for flank pain. Musculoskeletal: Positive for back pain, myalgias and neck pain.    Skin: Positive for strength equal and handgrips- no arm weakness reported    Left leg strength impaired related to left knee replacement surgery April 2018   Skin: Skin is warm and dry. Psychiatric: He has a normal mood and affect. His behavior is normal. Judgment and thought content normal.   Nursing note and vitals reviewed. Recent Imaging:  HISTORY: Lumbar facet arthropathy. FINDINGS: Lateral view of the lumbar spine in neutral position as well  as with flexion and extension were obtained. There is extensive facet  overgrowth at the L4 and L5 level as well as previous suspected bony  fusion with prominent paraspinal bone grafting present. There is grade  1-2 anterolisthesis of L5 on S1. This is diminished with flexion. Slight retrolisthesis of L4 on L5 is stable. There is retrolisthesis  of L1 on L2, L2 on L3 and L3 on L4. The L3-L4 listhesis is stable with  flexion and extension. The L2-L3 retrolisthesis is slightly diminished  with flexion with similar mild instability noted at the L1-L2 level  with accentuation of the retrolisthesis of L1 on L2 with flexion. No  fractures are present.     Impression  1.. There is listhesis at essentially all lumbar levels. There is  slight reduction of the anterolisthesis of L5 on S1 with flexion with  mild changes in listhesis of L1 on L2 and L2 on L3. . This would  suggest instability at these levels. There is no evidence of fracture. 3. Postoperative changes with evidence of bony buttressing in the  posterior elements at the L4 and L5 level.   Signed by Dr Robin Santana on 6/20/2017 2:20 PM          Patient Active Problem List   Diagnosis    Bilateral low back pain with right-sided sciatica    Lumbar facet arthropathy    Myofascial pain syndrome    Lumbar facet arthropathy    Lumbar facet arthropathy    Myofascial pain    Adhesive capsulitis of knee, left    CML (chronic myelocytic leukemia) (HCC)    Hyperlipidemia    Chronic midline low back pain with bilateral sciatica    decreasing daily narcotic intake. Discussed with the patient about the development of hyperalgesia with long term narcotic intake.      CC:  MD [place] APRN, 7/12/2018 at 11:57 AM

## 2018-07-19 ENCOUNTER — TELEPHONE (OUTPATIENT)
Dept: PAIN MANAGEMENT | Age: 63
End: 2018-07-19

## 2018-08-01 LAB
ALBUMIN SERPL-MCNC: 4.1 G/DL (ref 3.5–5.2)
ALP BLD-CCNC: 98 U/L (ref 40–130)
ALT SERPL-CCNC: 14 U/L (ref 5–41)
ANION GAP SERPL CALCULATED.3IONS-SCNC: 12 MMOL/L (ref 7–19)
AST SERPL-CCNC: 20 U/L (ref 5–40)
BASOPHILS ABSOLUTE: 0.1 K/UL (ref 0–0.2)
BASOPHILS RELATIVE PERCENT: 0.9 % (ref 0–1)
BILIRUB SERPL-MCNC: 0.5 MG/DL (ref 0.2–1.2)
BILIRUBIN URINE: NEGATIVE
BLOOD, URINE: NEGATIVE
BUN BLDV-MCNC: 10 MG/DL (ref 8–23)
CALCIUM SERPL-MCNC: 9.1 MG/DL (ref 8.8–10.2)
CHLORIDE BLD-SCNC: 101 MMOL/L (ref 98–111)
CLARITY: CLEAR
CO2: 27 MMOL/L (ref 22–29)
COLOR: YELLOW
CREAT SERPL-MCNC: 0.9 MG/DL (ref 0.5–1.2)
CREATININE URINE: 218.8 MG/DL (ref 4.2–622)
EOSINOPHILS ABSOLUTE: 0.3 K/UL (ref 0–0.6)
EOSINOPHILS RELATIVE PERCENT: 3.1 % (ref 0–5)
GFR NON-AFRICAN AMERICAN: >60
GLUCOSE BLD-MCNC: 114 MG/DL (ref 74–109)
GLUCOSE URINE: NEGATIVE MG/DL
HCT VFR BLD CALC: 44.9 % (ref 42–52)
HEMOGLOBIN: 14 G/DL (ref 14–18)
KETONES, URINE: NEGATIVE MG/DL
LEUKOCYTE ESTERASE, URINE: NEGATIVE
LYMPHOCYTES ABSOLUTE: 3.1 K/UL (ref 1.1–4.5)
LYMPHOCYTES RELATIVE PERCENT: 35.9 % (ref 20–40)
MAGNESIUM: 2 MG/DL (ref 1.6–2.4)
MCH RBC QN AUTO: 27.8 PG (ref 27–31)
MCHC RBC AUTO-ENTMCNC: 31.2 G/DL (ref 33–37)
MCV RBC AUTO: 89.1 FL (ref 80–94)
MONOCYTES ABSOLUTE: 0.7 K/UL (ref 0–0.9)
MONOCYTES RELATIVE PERCENT: 8.3 % (ref 0–10)
NEUTROPHILS ABSOLUTE: 4.5 K/UL (ref 1.5–7.5)
NEUTROPHILS RELATIVE PERCENT: 51.7 % (ref 50–65)
NITRITE, URINE: NEGATIVE
PDW BLD-RTO: 15.4 % (ref 11.5–14.5)
PH UA: 5.5
PHOSPHORUS: 3.7 MG/DL (ref 2.5–4.5)
PLATELET # BLD: 258 K/UL (ref 130–400)
PMV BLD AUTO: 9 FL (ref 9.4–12.4)
POTASSIUM SERPL-SCNC: 4.2 MMOL/L (ref 3.5–5)
PROTEIN PROTEIN: 19 MG/DL (ref 15–45)
PROTEIN UA: NEGATIVE MG/DL
RBC # BLD: 5.04 M/UL (ref 4.7–6.1)
SODIUM BLD-SCNC: 140 MMOL/L (ref 136–145)
SPECIFIC GRAVITY UA: 1.03
TOTAL PROTEIN: 6.9 G/DL (ref 6.6–8.7)
URIC ACID, SERUM: 4.1 MG/DL (ref 3.4–7)
UROBILINOGEN, URINE: 0.2 E.U./DL
WBC # BLD: 8.7 K/UL (ref 4.8–10.8)

## 2018-09-04 LAB
ALBUMIN SERPL-MCNC: 4.2 G/DL (ref 3.5–5.2)
ANION GAP SERPL CALCULATED.3IONS-SCNC: 11 MMOL/L (ref 7–19)
BILIRUBIN URINE: NEGATIVE
BLOOD, URINE: NEGATIVE
BUN BLDV-MCNC: 10 MG/DL (ref 8–23)
CALCIUM SERPL-MCNC: 8.7 MG/DL (ref 8.8–10.2)
CHLORIDE BLD-SCNC: 103 MMOL/L (ref 98–111)
CLARITY: CLEAR
CO2: 27 MMOL/L (ref 22–29)
COLOR: YELLOW
CREAT SERPL-MCNC: 1 MG/DL (ref 0.5–1.2)
CREATININE URINE: 309.9 MG/DL (ref 4.2–622)
GFR NON-AFRICAN AMERICAN: >60
GLUCOSE BLD-MCNC: 104 MG/DL (ref 74–109)
GLUCOSE URINE: NEGATIVE MG/DL
KETONES, URINE: NEGATIVE MG/DL
LEUKOCYTE ESTERASE, URINE: NEGATIVE
NITRITE, URINE: NEGATIVE
PH UA: 6
PHOSPHORUS: 2.1 MG/DL (ref 2.5–4.5)
POTASSIUM SERPL-SCNC: 3.5 MMOL/L (ref 3.5–5)
PROTEIN PROTEIN: 28 MG/DL (ref 15–45)
PROTEIN UA: ABNORMAL MG/DL
SODIUM BLD-SCNC: 141 MMOL/L (ref 136–145)
SPECIFIC GRAVITY UA: 1.03
UROBILINOGEN, URINE: 1 E.U./DL

## 2019-01-22 ENCOUNTER — LAB REQUISITION (OUTPATIENT)
Dept: LAB | Facility: HOSPITAL | Age: 64
End: 2019-01-22

## 2019-01-22 DIAGNOSIS — Z00.00 ENCOUNTER FOR GENERAL ADULT MEDICAL EXAMINATION WITHOUT ABNORMAL FINDINGS: ICD-10-CM

## 2019-01-22 LAB
ALBUMIN SERPL-MCNC: 4 G/DL (ref 3.5–5)
ALBUMIN/GLOB SERPL: 1.5 G/DL (ref 1.1–2.5)
ALP SERPL-CCNC: 69 U/L (ref 24–120)
ALT SERPL W P-5'-P-CCNC: 35 U/L (ref 0–54)
ANION GAP SERPL CALCULATED.3IONS-SCNC: 10 MMOL/L (ref 4–13)
AST SERPL-CCNC: 41 U/L (ref 7–45)
BILIRUB SERPL-MCNC: 0.5 MG/DL (ref 0.1–1)
BUN BLD-MCNC: 14 MG/DL (ref 5–21)
BUN/CREAT SERPL: 11.9 (ref 7–25)
CALCIUM SPEC-SCNC: 9.4 MG/DL (ref 8.4–10.4)
CHLORIDE SERPL-SCNC: 99 MMOL/L (ref 98–110)
CO2 SERPL-SCNC: 32 MMOL/L (ref 24–31)
CREAT BLD-MCNC: 1.18 MG/DL (ref 0.5–1.4)
FERRITIN SERPL-MCNC: 69.8 NG/ML (ref 17.9–464)
GFR SERPL CREATININE-BSD FRML MDRD: 63 ML/MIN/1.73
GLOBULIN UR ELPH-MCNC: 2.7 GM/DL
GLUCOSE BLD-MCNC: 128 MG/DL (ref 70–100)
IRON 24H UR-MRATE: 112 MCG/DL (ref 42–180)
IRON SATN MFR SERPL: 34 % (ref 20–45)
PHOSPHATE SERPL-MCNC: 3.9 MG/DL (ref 2.5–4.5)
POTASSIUM BLD-SCNC: 4.7 MMOL/L (ref 3.5–5.3)
PROT SERPL-MCNC: 6.7 G/DL (ref 6.3–8.7)
SODIUM BLD-SCNC: 141 MMOL/L (ref 135–145)
TIBC SERPL-MCNC: 328 MCG/DL (ref 225–420)

## 2019-01-22 PROCEDURE — 84100 ASSAY OF PHOSPHORUS: CPT | Performed by: INTERNAL MEDICINE

## 2019-01-22 PROCEDURE — 83540 ASSAY OF IRON: CPT | Performed by: INTERNAL MEDICINE

## 2019-01-22 PROCEDURE — 83550 IRON BINDING TEST: CPT | Performed by: INTERNAL MEDICINE

## 2019-01-22 PROCEDURE — 82728 ASSAY OF FERRITIN: CPT | Performed by: INTERNAL MEDICINE

## 2019-01-22 PROCEDURE — 80053 COMPREHEN METABOLIC PANEL: CPT | Performed by: INTERNAL MEDICINE

## 2019-01-28 LAB
INTERPRETATION: NORMAL
LAB DIRECTOR NAME PROVIDER: NORMAL
LABORATORY COMMENT REPORT: NORMAL
Lab: NORMAL
T(ABL1,BCR)B2A2/CONTROL (IS) BLD/T: NORMAL %
T(ABL1,BCR)B3A2 MAR QL: NORMAL %
T(ABL1,BCR)E1A2/CONTROL BLD/T: NORMAL %

## 2019-02-01 ENCOUNTER — TRANSCRIBE ORDERS (OUTPATIENT)
Dept: ONCOLOGY | Facility: CLINIC | Age: 64
End: 2019-02-01

## 2019-02-01 DIAGNOSIS — C92.10 CHRONIC MYELOID LEUKEMIA (HCC): Primary | ICD-10-CM

## 2019-05-17 ENCOUNTER — LAB (OUTPATIENT)
Dept: LAB | Facility: HOSPITAL | Age: 64
End: 2019-05-17
Attending: INTERNAL MEDICINE

## 2019-05-17 DIAGNOSIS — C92.10 CHRONIC MYELOID LEUKEMIA (HCC): ICD-10-CM

## 2019-05-17 LAB
ALBUMIN SERPL-MCNC: 4.7 G/DL (ref 3.5–5)
ALBUMIN/GLOB SERPL: 1.7 G/DL (ref 1.1–2.5)
ALP SERPL-CCNC: 92 U/L (ref 24–120)
ALT SERPL W P-5'-P-CCNC: 228 U/L (ref 0–54)
ANION GAP SERPL CALCULATED.3IONS-SCNC: 9 MMOL/L (ref 4–13)
AST SERPL-CCNC: 199 U/L (ref 7–45)
BASOPHILS # BLD AUTO: 0.09 10*3/MM3 (ref 0–0.2)
BASOPHILS NFR BLD AUTO: 1.5 % (ref 0–2)
BILIRUB SERPL-MCNC: 1 MG/DL (ref 0.1–1)
BUN BLD-MCNC: 16 MG/DL (ref 5–21)
BUN/CREAT SERPL: 15.2 (ref 7–25)
CALCIUM SPEC-SCNC: 9 MG/DL (ref 8.4–10.4)
CHLORIDE SERPL-SCNC: 104 MMOL/L (ref 98–110)
CO2 SERPL-SCNC: 27 MMOL/L (ref 24–31)
CREAT BLD-MCNC: 1.05 MG/DL (ref 0.5–1.4)
DEPRECATED RDW RBC AUTO: 45.2 FL (ref 40–54)
EOSINOPHIL # BLD AUTO: 0.52 10*3/MM3 (ref 0–0.7)
EOSINOPHIL NFR BLD AUTO: 8.7 % (ref 0–4)
ERYTHROCYTE [DISTWIDTH] IN BLOOD BY AUTOMATED COUNT: 13.9 % (ref 12–15)
FERRITIN SERPL-MCNC: 144 NG/ML (ref 17.9–464)
GFR SERPL CREATININE-BSD FRML MDRD: 72 ML/MIN/1.73
GLOBULIN UR ELPH-MCNC: 2.7 GM/DL
GLUCOSE BLD-MCNC: 104 MG/DL (ref 70–100)
HCT VFR BLD AUTO: 41.8 % (ref 40–52)
HGB BLD-MCNC: 13.9 G/DL (ref 14–18)
IMM GRANULOCYTES # BLD AUTO: 0 10*3/MM3 (ref 0–0.05)
IMM GRANULOCYTES NFR BLD AUTO: 0 % (ref 0–5)
IRON 24H UR-MRATE: 90 MCG/DL (ref 42–180)
IRON SATN MFR SERPL: 26 % (ref 20–45)
LYMPHOCYTES # BLD AUTO: 1.92 10*3/MM3 (ref 0.72–4.86)
LYMPHOCYTES NFR BLD AUTO: 32.3 % (ref 15–45)
MCH RBC QN AUTO: 30 PG (ref 28–32)
MCHC RBC AUTO-ENTMCNC: 33.3 G/DL (ref 33–36)
MCV RBC AUTO: 90.1 FL (ref 82–95)
MONOCYTES # BLD AUTO: 0.74 10*3/MM3 (ref 0.19–1.3)
MONOCYTES NFR BLD AUTO: 12.4 % (ref 4–12)
NEUTROPHILS # BLD AUTO: 2.68 10*3/MM3 (ref 1.87–8.4)
NEUTROPHILS NFR BLD AUTO: 45.1 % (ref 39–78)
NRBC BLD AUTO-RTO: 0 /100 WBC (ref 0–0.2)
PLATELET # BLD AUTO: 220 10*3/MM3 (ref 130–400)
PMV BLD AUTO: 9.8 FL (ref 6–12)
POTASSIUM BLD-SCNC: 4.3 MMOL/L (ref 3.5–5.3)
PROT SERPL-MCNC: 7.4 G/DL (ref 6.3–8.7)
RBC # BLD AUTO: 4.64 10*6/MM3 (ref 4.8–5.9)
SODIUM BLD-SCNC: 140 MMOL/L (ref 135–145)
TIBC SERPL-MCNC: 340 MCG/DL (ref 225–420)
WBC NRBC COR # BLD: 5.95 10*3/MM3 (ref 4.8–10.8)

## 2019-05-17 PROCEDURE — 82728 ASSAY OF FERRITIN: CPT

## 2019-05-17 PROCEDURE — 36415 COLL VENOUS BLD VENIPUNCTURE: CPT

## 2019-05-17 PROCEDURE — 85025 COMPLETE CBC W/AUTO DIFF WBC: CPT

## 2019-05-17 PROCEDURE — 80053 COMPREHEN METABOLIC PANEL: CPT

## 2019-05-17 PROCEDURE — 83540 ASSAY OF IRON: CPT

## 2019-05-17 PROCEDURE — 83550 IRON BINDING TEST: CPT

## 2019-05-24 ENCOUNTER — LAB (OUTPATIENT)
Dept: LAB | Facility: HOSPITAL | Age: 64
End: 2019-05-24

## 2019-05-24 ENCOUNTER — TRANSCRIBE ORDERS (OUTPATIENT)
Dept: ADMINISTRATIVE | Facility: HOSPITAL | Age: 64
End: 2019-05-24

## 2019-05-24 DIAGNOSIS — B19.20 HEPATITIS C VIRUS INFECTION WITHOUT HEPATIC COMA, UNSPECIFIED CHRONICITY: ICD-10-CM

## 2019-05-24 DIAGNOSIS — C92.10 CHRONIC MYELOID LEUKEMIA (HCC): Primary | ICD-10-CM

## 2019-05-24 DIAGNOSIS — C92.10 CHRONIC MYELOID LEUKEMIA (HCC): ICD-10-CM

## 2019-05-24 LAB
BASOPHILS # BLD AUTO: 0.08 10*3/MM3 (ref 0–0.2)
BASOPHILS NFR BLD AUTO: 1.1 % (ref 0–2)
DEPRECATED RDW RBC AUTO: 47.2 FL (ref 40–54)
EOSINOPHIL # BLD AUTO: 0.45 10*3/MM3 (ref 0–0.7)
EOSINOPHIL NFR BLD AUTO: 6.4 % (ref 0–4)
ERYTHROCYTE [DISTWIDTH] IN BLOOD BY AUTOMATED COUNT: 13.8 % (ref 12–15)
HAV IGM SERPL QL IA: NEGATIVE
HBV CORE IGM SERPL QL IA: NEGATIVE
HBV SURFACE AG SERPL QL IA: NEGATIVE
HCT VFR BLD AUTO: 45.1 % (ref 40–52)
HCV AB SER DONR QL: REACTIVE
HCV S/C RATIO: 27.6 (ref 0–0.99)
HGB BLD-MCNC: 15 G/DL (ref 14–18)
IMM GRANULOCYTES # BLD AUTO: 0.02 10*3/MM3 (ref 0–0.05)
IMM GRANULOCYTES NFR BLD AUTO: 0.3 % (ref 0–5)
LYMPHOCYTES # BLD AUTO: 2.33 10*3/MM3 (ref 0.72–4.86)
LYMPHOCYTES NFR BLD AUTO: 33.3 % (ref 15–45)
MCH RBC QN AUTO: 30.7 PG (ref 28–32)
MCHC RBC AUTO-ENTMCNC: 33.3 G/DL (ref 33–36)
MCV RBC AUTO: 92.2 FL (ref 82–95)
MONOCYTES # BLD AUTO: 0.69 10*3/MM3 (ref 0.19–1.3)
MONOCYTES NFR BLD AUTO: 9.9 % (ref 4–12)
NEUTROPHILS # BLD AUTO: 3.42 10*3/MM3 (ref 1.87–8.4)
NEUTROPHILS NFR BLD AUTO: 49 % (ref 39–78)
NRBC BLD AUTO-RTO: 0 /100 WBC (ref 0–0.2)
PLATELET # BLD AUTO: 263 10*3/MM3 (ref 130–400)
PMV BLD AUTO: 10.2 FL (ref 6–12)
RBC # BLD AUTO: 4.89 10*6/MM3 (ref 4.8–5.9)
WBC NRBC COR # BLD: 6.99 10*3/MM3 (ref 4.8–10.8)

## 2019-05-24 PROCEDURE — 80074 ACUTE HEPATITIS PANEL: CPT | Performed by: INTERNAL MEDICINE

## 2019-05-24 PROCEDURE — 36415 COLL VENOUS BLD VENIPUNCTURE: CPT | Performed by: INTERNAL MEDICINE

## 2019-05-24 PROCEDURE — 85025 COMPLETE CBC W/AUTO DIFF WBC: CPT | Performed by: INTERNAL MEDICINE

## 2019-05-28 ENCOUNTER — TRANSCRIBE ORDERS (OUTPATIENT)
Dept: ADMINISTRATIVE | Facility: HOSPITAL | Age: 64
End: 2019-05-28

## 2019-05-28 DIAGNOSIS — B19.20 HEPATITIS C VIRUS INFECTION WITHOUT HEPATIC COMA, UNSPECIFIED CHRONICITY: Primary | ICD-10-CM

## 2019-06-06 ENCOUNTER — HOSPITAL ENCOUNTER (OUTPATIENT)
Dept: ULTRASOUND IMAGING | Facility: HOSPITAL | Age: 64
Discharge: HOME | End: 2019-06-06
Admitting: INTERNAL MEDICINE

## 2019-06-06 DIAGNOSIS — B19.20 HEPATITIS C VIRUS INFECTION WITHOUT HEPATIC COMA, UNSPECIFIED CHRONICITY: ICD-10-CM

## 2019-06-06 PROCEDURE — 76705 ECHO EXAM OF ABDOMEN: CPT

## 2019-06-07 ENCOUNTER — OFFICE VISIT (OUTPATIENT)
Dept: GASTROENTEROLOGY | Facility: CLINIC | Age: 64
End: 2019-06-07

## 2019-06-07 VITALS
SYSTOLIC BLOOD PRESSURE: 124 MMHG | WEIGHT: 152 LBS | OXYGEN SATURATION: 100 % | BODY MASS INDEX: 25.33 KG/M2 | TEMPERATURE: 97 F | HEIGHT: 65 IN | HEART RATE: 68 BPM | DIASTOLIC BLOOD PRESSURE: 78 MMHG

## 2019-06-07 DIAGNOSIS — D64.9 ANEMIA, UNSPECIFIED TYPE: Primary | ICD-10-CM

## 2019-06-07 DIAGNOSIS — F10.11 HISTORY OF ALCOHOL ABUSE: ICD-10-CM

## 2019-06-07 DIAGNOSIS — B18.2 CHRONIC HEPATITIS C WITHOUT HEPATIC COMA (HCC): ICD-10-CM

## 2019-06-07 DIAGNOSIS — R74.8 ELEVATED LIVER ENZYMES: ICD-10-CM

## 2019-06-07 PROCEDURE — 99214 OFFICE O/P EST MOD 30 MIN: CPT | Performed by: NURSE PRACTITIONER

## 2019-06-07 RX ORDER — GABAPENTIN 300 MG/1
CAPSULE ORAL
COMMUNITY
Start: 2019-04-04 | End: 2022-02-07

## 2019-06-07 RX ORDER — OXYCODONE AND ACETAMINOPHEN 10; 325 MG/1; MG/1
TABLET ORAL
COMMUNITY
Start: 2019-05-13 | End: 2021-09-29 | Stop reason: ALTCHOICE

## 2019-06-07 RX ORDER — OMEPRAZOLE 40 MG/1
40 CAPSULE, DELAYED RELEASE ORAL DAILY
COMMUNITY
Start: 2019-05-17

## 2019-06-07 RX ORDER — IMATINIB MESYLATE 100 MG/1
TABLET ORAL
COMMUNITY
Start: 2019-05-28 | End: 2020-04-20 | Stop reason: SDUPTHER

## 2019-06-07 RX ORDER — ROPINIROLE 0.5 MG/1
TABLET, FILM COATED ORAL
COMMUNITY
Start: 2019-05-17 | End: 2022-02-07

## 2019-06-07 RX ORDER — CITALOPRAM 40 MG/1
40 TABLET ORAL DAILY
COMMUNITY
Start: 2019-05-17

## 2019-06-07 RX ORDER — FLUTICASONE PROPIONATE 50 MCG
2 SPRAY, SUSPENSION (ML) NASAL DAILY
COMMUNITY
Start: 2019-04-04

## 2019-06-07 NOTE — H&P (VIEW-ONLY)
Chief Complaint:   Chief Complaint   Patient presents with   • Colon Cancer Screening   • Hepatitis C         Patient ID: Cayetano Curry is a 64 y.o. male     History of Present Illness:   Referred to our office for findings of hepatitis C and anemia. History of chronic leukemia diagnosed in 2003 followed by Dr. Kramer.    Labs on 5/24/2019: CBC unremarkable.  Negative hepatitis A negative hepatitis B positive hepatitis C. He tells me was treated in the past for Hep C with harvoni.  Records show the patient was initially seen in our office on 3/14/2016 for complaints of hepatitis C diagnosed in the 1990s.  The patient had not had any work-up for this.  He denied and continues to deny any history of IV drug use.  A liver biopsy performed 11/2014 revealed mild portal hypertension grade 2 stage 0.  He was found to have genotype 1a at that time as well.  The patient was seen back in the office on 4/8/2016 for final work-up of hepatitis C.  On 6/30/2016 he was seen in the office last he was being treated with Harvoni once per day for 8 weeks.  Patient stated that he had 1 week left of it.  After that visit there is no note of the patient following up after treatment.  Hepatitis C quant was ordered and HCV was not detected.     The patient denies any nausea, vomiting, epigastric pain, dysphagia, pyrosis or hematemesis.  The patient denies any fever or chills.  Denies any melena or hematochezia.  Denies any unintentional weight loss or loss of appetite.  He denies any jaundice, icterus or ascites. The patient states that he stopped drinking about 1 year ago. He drank about 1 case a day for about 30 years.     The patient's last colonoscopy was performed on 3/31/2016 found to be normal recall set for 10 years.  CBC on 5/17/2019 revealed a hemoglobin of 13.  CBC repeated on 5/24/2019 revealed a hemoglobin of 15.      Liver ultrasound 6/6/2019  IMPRESSION:  Mildly coarsened liver echogenicity, which can be seen with  chronic  liver disease.  This report was finalized on 06/06/2019 10:56 by Dr Rissa Salazar MD.    Past Medical History:   Diagnosis Date   • Chondromalacia    • Chronic myeloid leukemia (CMS/HCC)    • DJD (degenerative joint disease)    • GERD (gastroesophageal reflux disease)    • Hep C w/o coma, chronic (CMS/HCC)    • Leukopenia    • Lichen planus    • Normocytic normochromic anemia    • Restless leg syndrome    • Seasonal allergic rhinitis        Past Surgical History:   Procedure Laterality Date   • ANTERIOR CERVICAL FUSION     • CARPAL TUNNEL RELEASE Right    • COLONOSCOPY  03/31/2016    Normal; Repeat 10 years    • GANGLION CYST EXCISION     • KNEE ARTHROSCOPY Left    • KNEE ARTHROSCOPY Right    • LIVER BIOPSY  11/03/2004    Mild portal inflammation; No hepatic steatosis, portal fibrosis, cirrhosis, or metastatic malignancy; mild increase in stainable iron (3+); Changes consistent with Hep C, Scheuer grade 2, stage 0   • NECK SURGERY      To replace neck plate after a fall    • OTHER SURGICAL HISTORY      arm surgery with hardware   • REPLACEMENT TOTAL KNEE Left    • SPINAL FUSION           Current Outpatient Medications:   •  citalopram (CeleXA) 40 MG tablet, , Disp: , Rfl:   •  fluticasone (FLONASE) 50 MCG/ACT nasal spray, , Disp: , Rfl:   •  gabapentin (NEURONTIN) 300 MG capsule, , Disp: , Rfl:   •  GLEEVEC 100 MG chemo tablet, , Disp: , Rfl:   •  omeprazole (priLOSEC) 40 MG capsule, , Disp: , Rfl:   •  oxyCODONE-acetaminophen (PERCOCET)  MG per tablet, , Disp: , Rfl:   •  rOPINIRole (REQUIP) 0.5 MG tablet, , Disp: , Rfl:   •  Sod Picosulfate-Mag Ox-Cit Acd (CLENPIQ) 10-3.5-12 MG-GM -GM/160ML solution, Take 2 bottles by mouth Take As Directed., Disp: 2 bottle, Rfl: 0    No Known Allergies    Social History     Socioeconomic History   • Marital status:      Spouse name: Not on file   • Number of children: Not on file   • Years of education: Not on file   • Highest education level: Not on file   "  Tobacco Use   • Smoking status: Former Smoker   • Smokeless tobacco: Never Used   Substance and Sexual Activity   • Alcohol use: Yes     Comment: occ   • Drug use: No   • Sexual activity: Defer       Family History   Problem Relation Age of Onset   • Prostate cancer Father    • Colon cancer Neg Hx    • Colon polyps Neg Hx        Vitals:    06/07/19 0848   BP: 124/78   Pulse: 68   Temp: 97 °F (36.1 °C)   SpO2: 100%   Weight: 68.9 kg (152 lb)   Height: 165.1 cm (65\")       Review of Systems:    General:    Present -feeling well   Skin:    Not Present-Rash   HEENT:     Not Present-Acute visual changes or Acute hearing changes   Neck :    Not Present- swollen glands   Genitourinary:      Not Present- burning, frequency, urgency hematuria, dysuria,   Cardiovascular:   Not Present-chest pain, palpitations, or pressure   Respiratory:   Not Present- shortness of breath or cough   Gastrointestinal:  Musculoskeletal:  Neurological:  Psychiatric:   Present as mentioned in the HP    Not Present. Recent gait disturbances.    Not Present-Seizures and weakness in extremities.    Not Present- Anxiety or Depression.       Physical Exam:    General Appearance:    Alert, cooperative, in no acute distress   Psych:    Mood appropriate    Eyes:          conjunctivae and sclerae normal, no   icterus, no pallor   ENMT:    Ears appear intact with no abnormalities noted oral mucosa moist   Neck:   No adenopathy, supple, trachea midline, no thyromegaly, no   carotid bruit, no JVD    Cardiovascular:    Regular rhythm and normal rate, normal S1 and S2, no            murmur, no gallop, no rub, no click   Gastrointestinal:     Inspection normal.  Normal bowel sounds, no masses, no organomegaly, soft round non-tender, non-distended, no guarding, no rebound or tenderness. No hepatosplenomegaly.   Skin:   No bleeding, bruising or rash   Neurologic:   nonfocal       Lab Results   Component Value Date    WBC 6.99 05/24/2019    WBC 5.95 05/17/2019    " WBC 5.55 03/15/2016    HGB 15.0 05/24/2019    HGB 13.9 (L) 05/17/2019    HGB 10.3 (L) 04/21/2018    HCT 45.1 05/24/2019    HCT 41.8 05/17/2019    HCT 31.6 (L) 04/21/2018     05/24/2019     05/17/2019     03/15/2016        Lab Results   Component Value Date     05/17/2019     01/22/2019     08/01/2018    K 4.3 05/17/2019    K 4.7 01/22/2019    K 4.2 08/01/2018     05/17/2019    CL 99 01/22/2019     08/01/2018    CO2 27.0 05/17/2019    CO2 32.0 (H) 01/22/2019    CO2 27 08/01/2018    BUN 16 05/17/2019    BUN 14 01/22/2019    BUN 10 08/01/2018    CREATININE 1.05 05/17/2019    CREATININE 1.18 01/22/2019    CREATININE 0.9 08/01/2018    BILITOT 1.0 05/17/2019    BILITOT 0.5 01/22/2019    BILITOT 0.5 08/01/2018    ALKPHOS 92 05/17/2019    ALKPHOS 69 01/22/2019    ALKPHOS 98 08/01/2018     (H) 05/17/2019    ALT 35 01/22/2019    ALT 14 08/01/2018     (H) 05/17/2019    AST 41 01/22/2019    AST 20 08/01/2018    GLUCOSE 104 (H) 05/17/2019    GLUCOSE 128 (H) 01/22/2019    GLUCOSE 114 (H) 08/01/2018       Lab Results   Component Value Date    INR 0.97 04/05/2018    INR 0.95 03/15/2016    INR 1.00 04/28/2014       Body mass index is 25.29 kg/m². Patient's Body mass index is 25.29 kg/m². BMI is above normal parameters. Recommendations include: nutrition counseling schedule colonoscopy.    Assessment and Plan:  Assessment/Plan   Cayetnao was seen today for colon cancer screening and hepatitis c.    Diagnoses and all orders for this visit:    Anemia, unspecified type  Comments:  Schedule colonoscopy  Orders:  -     Case Request; Standing  -     Case Request    Chronic hepatitis C without hepatic coma (CMS/HCC)  Comments:  Labs ordered as noted below  Orders:  -     HCV RNA By PCR, Qn Rfx Cassie; Future  -     HCV RNA DINORA QL RFX QT; Future  -     Hepatitis B Surface Antibody; Future  -     Ethanol; Future  -     Drug Screen (10), Serum; Future  -     US Liver; Future  -      HIV-1 & HIV-2 Antibodies; Future    Elevated liver enzymes    History of alcohol abuse    Other orders  -     Sod Picosulfate-Mag Ox-Cit Acd (CLENPIQ) 10-3.5-12 MG-GM -GM/160ML solution; Take 2 bottles by mouth Take As Directed.  -     Follow Anesthesia Guidelines / Standing Orders; Future  -     Obtain Informed Consent; Future  -     Implement Anesthesia Orders Day of Procedure; Standing  -     Obtain Informed Consent; Standing  -     Verify bowel prep was successful; Standing        The risks, benefits, and alternatives of colonoscopy were reviewed with the patient today.  Risks including perforation of the colon possibly requiring surgery or colostomy.  Additional risks include risk of bleeding from biopsies or removal of colon tissue.  There is also the risk of a drug reaction or problems with anesthesia.  This will be discussed with the further by the anesthesia team on the day of the procedure.  Lastly there is a possibility of missing a colon polyp or cancer.  The benefits include the diagnosis and management of disease of the colon and rectum.  Alternatives to colonoscopy include barium enema, laboratory testing, radiographic evaluation, or no intervention.  The patient verbalizes understanding and agrees.       Patient Instructions   Hepatitis C  Hepatitis C is a liver infection that is caused by a virus. Many people have no symptoms or only mild symptoms. Hepatitis C is contagious. This means that it can spread from person to person. You can get this disease through:  · Blood.  · Birth.  · Body fluids, like breast milk, tears, semen, vaginal fluids, and spit (saliva).  · Blood or organ donations done in the [place] before 1992.    Follow these instructions at home:  Medicines  · Take over-the-counter and prescription medicines only as told by your doctor.  · Take your antiviral medicine as told by your doctor. Do not stop taking the antiviral medicine even if you start to feel better.  · Do not take  any new medicines unless your doctor says that this is okay. This includes over-the-counter medicines and birth control pills.  Activity  · Rest when you feel tired.  · Do not have sex until your doctor says this is okay.  · Ask your doctor when you may go back to school or work.  Eating and drinking  · Eat a balanced diet. Eat plenty of:  ? Fruits and vegetables.  ? Whole grains.  ? Low-fat (lean) meats or non-meat proteins, such as beans or tofu.  · Drink enough fluids to keep your pee (urine) clear or pale yellow.  · Do not drink alcohol.  How is this prevented?  · Wash your hands often with soap and water. If you do not have soap and water, use hand .  · Do not share needles or syringes.  · Practice safe sex and use condoms.  · Do not handle blood or body fluids without gloves or other protection.  · Avoid getting tattoos or piercings in places that are not clean.  General instructions  · Do not share toothbrushes, nail clippers, or razors.  · Wash your hands often with soap and water. If you do not have soap and water, use hand .  · Cover any cuts or open sores on your skin.  · Keep all follow-up visits as told by your doctor. This is important. You may need follow-up visits every 6–12 months.  Contact a doctor if:  · You have a fever.  · You have belly (abdominal) pain.  · Your pee (urine) is dark.  · Your poop (bowel movement) is the color of josiah.  · You have joint pain.  Get help right away if:  · You feel more and more tired (fatigued).  · You feel more and more weak.  · You do not feel like eating.  · You cannot eat or drink without throwing up (vomiting).  · Your skin or the whites of your eyes turn yellow (jaundice) or turn more yellow than they were before.  · You bruise or bleed easily.  Summary  · Hepatitis C is a liver infection that is caused by a virus.  · The virus can be spread through blood and body fluids.  · Do not take any new medicines unless your doctor says that this is  okay.  · Wash your hands often with soap and water. This helps prevent infection.  This information is not intended to replace advice given to you by your health care provider. Make sure you discuss any questions you have with your health care provider.  Document Released: 11/30/2009 Document Revised: 01/23/2018 Document Reviewed: 01/23/2018  [employer] Interactive Patient Education © 2019 [employer]        Next follow-up appointment          EMR Dragon/Transcription disclaimer:  Much of this encounter note is an electronic transcription/translation of spoken language to printed text. The electronic translation of spoken language may permit erroneous, or at times, nonsensical words or phrases to be inadvertently transcribed; although I have reviewed the note for such errors, some may still exist.

## 2019-06-07 NOTE — PROGRESS NOTES
Chief Complaint:   Chief Complaint   Patient presents with   • Colon Cancer Screening   • Hepatitis C         Patient ID: Cayetano Curry is a 64 y.o. male     History of Present Illness:   Referred to our office for findings of hepatitis C and anemia. History of chronic leukemia diagnosed in 2003 followed by Dr. Kramer.    Labs on 5/24/2019: CBC unremarkable.  Negative hepatitis A negative hepatitis B positive hepatitis C. He tells me was treated in the past for Hep C with harvoni.  Records show the patient was initially seen in our office on 3/14/2016 for complaints of hepatitis C diagnosed in the 1990s.  The patient had not had any work-up for this.  He denied and continues to deny any history of IV drug use.  A liver biopsy performed 11/2014 revealed mild portal hypertension grade 2 stage 0.  He was found to have genotype 1a at that time as well.  The patient was seen back in the office on 4/8/2016 for final work-up of hepatitis C.  On 6/30/2016 he was seen in the office last he was being treated with Harvoni once per day for 8 weeks.  Patient stated that he had 1 week left of it.  After that visit there is no note of the patient following up after treatment.  Hepatitis C quant was ordered and HCV was not detected.     The patient denies any nausea, vomiting, epigastric pain, dysphagia, pyrosis or hematemesis.  The patient denies any fever or chills.  Denies any melena or hematochezia.  Denies any unintentional weight loss or loss of appetite.  He denies any jaundice, icterus or ascites. The patient states that he stopped drinking about 1 year ago. He drank about 1 case a day for about 30 years.     The patient's last colonoscopy was performed on 3/31/2016 found to be normal recall set for 10 years.  CBC on 5/17/2019 revealed a hemoglobin of 13.  CBC repeated on 5/24/2019 revealed a hemoglobin of 15.      Liver ultrasound 6/6/2019  IMPRESSION:  Mildly coarsened liver echogenicity, which can be seen with  chronic  liver disease.  This report was finalized on 06/06/2019 10:56 by Dr Rissa Salazar MD.    Past Medical History:   Diagnosis Date   • Chondromalacia    • Chronic myeloid leukemia (CMS/HCC)    • DJD (degenerative joint disease)    • GERD (gastroesophageal reflux disease)    • Hep C w/o coma, chronic (CMS/HCC)    • Leukopenia    • Lichen planus    • Normocytic normochromic anemia    • Restless leg syndrome    • Seasonal allergic rhinitis        Past Surgical History:   Procedure Laterality Date   • ANTERIOR CERVICAL FUSION     • CARPAL TUNNEL RELEASE Right    • COLONOSCOPY  03/31/2016    Normal; Repeat 10 years    • GANGLION CYST EXCISION     • KNEE ARTHROSCOPY Left    • KNEE ARTHROSCOPY Right    • LIVER BIOPSY  11/03/2004    Mild portal inflammation; No hepatic steatosis, portal fibrosis, cirrhosis, or metastatic malignancy; mild increase in stainable iron (3+); Changes consistent with Hep C, Scheuer grade 2, stage 0   • NECK SURGERY      To replace neck plate after a fall    • OTHER SURGICAL HISTORY      arm surgery with hardware   • REPLACEMENT TOTAL KNEE Left    • SPINAL FUSION           Current Outpatient Medications:   •  citalopram (CeleXA) 40 MG tablet, , Disp: , Rfl:   •  fluticasone (FLONASE) 50 MCG/ACT nasal spray, , Disp: , Rfl:   •  gabapentin (NEURONTIN) 300 MG capsule, , Disp: , Rfl:   •  GLEEVEC 100 MG chemo tablet, , Disp: , Rfl:   •  omeprazole (priLOSEC) 40 MG capsule, , Disp: , Rfl:   •  oxyCODONE-acetaminophen (PERCOCET)  MG per tablet, , Disp: , Rfl:   •  rOPINIRole (REQUIP) 0.5 MG tablet, , Disp: , Rfl:   •  Sod Picosulfate-Mag Ox-Cit Acd (CLENPIQ) 10-3.5-12 MG-GM -GM/160ML solution, Take 2 bottles by mouth Take As Directed., Disp: 2 bottle, Rfl: 0    No Known Allergies    Social History     Socioeconomic History   • Marital status:      Spouse name: Not on file   • Number of children: Not on file   • Years of education: Not on file   • Highest education level: Not on file  "  Tobacco Use   • Smoking status: Former Smoker   • Smokeless tobacco: Never Used   Substance and Sexual Activity   • Alcohol use: Yes     Comment: occ   • Drug use: No   • Sexual activity: Defer       Family History   Problem Relation Age of Onset   • Prostate cancer Father    • Colon cancer Neg Hx    • Colon polyps Neg Hx        Vitals:    06/07/19 0848   BP: 124/78   Pulse: 68   Temp: 97 °F (36.1 °C)   SpO2: 100%   Weight: 68.9 kg (152 lb)   Height: 165.1 cm (65\")       Review of Systems:    General:    Present -feeling well   Skin:    Not Present-Rash   HEENT:     Not Present-Acute visual changes or Acute hearing changes   Neck :    Not Present- swollen glands   Genitourinary:      Not Present- burning, frequency, urgency hematuria, dysuria,   Cardiovascular:   Not Present-chest pain, palpitations, or pressure   Respiratory:   Not Present- shortness of breath or cough   Gastrointestinal:  Musculoskeletal:  Neurological:  Psychiatric:   Present as mentioned in the HP    Not Present. Recent gait disturbances.    Not Present-Seizures and weakness in extremities.    Not Present- Anxiety or Depression.       Physical Exam:    General Appearance:    Alert, cooperative, in no acute distress   Psych:    Mood appropriate    Eyes:          conjunctivae and sclerae normal, no   icterus, no pallor   ENMT:    Ears appear intact with no abnormalities noted oral mucosa moist   Neck:   No adenopathy, supple, trachea midline, no thyromegaly, no   carotid bruit, no JVD    Cardiovascular:    Regular rhythm and normal rate, normal S1 and S2, no            murmur, no gallop, no rub, no click   Gastrointestinal:     Inspection normal.  Normal bowel sounds, no masses, no organomegaly, soft round non-tender, non-distended, no guarding, no rebound or tenderness. No hepatosplenomegaly.   Skin:   No bleeding, bruising or rash   Neurologic:   nonfocal       Lab Results   Component Value Date    WBC 6.99 05/24/2019    WBC 5.95 05/17/2019    " WBC 5.55 03/15/2016    HGB 15.0 05/24/2019    HGB 13.9 (L) 05/17/2019    HGB 10.3 (L) 04/21/2018    HCT 45.1 05/24/2019    HCT 41.8 05/17/2019    HCT 31.6 (L) 04/21/2018     05/24/2019     05/17/2019     03/15/2016        Lab Results   Component Value Date     05/17/2019     01/22/2019     08/01/2018    K 4.3 05/17/2019    K 4.7 01/22/2019    K 4.2 08/01/2018     05/17/2019    CL 99 01/22/2019     08/01/2018    CO2 27.0 05/17/2019    CO2 32.0 (H) 01/22/2019    CO2 27 08/01/2018    BUN 16 05/17/2019    BUN 14 01/22/2019    BUN 10 08/01/2018    CREATININE 1.05 05/17/2019    CREATININE 1.18 01/22/2019    CREATININE 0.9 08/01/2018    BILITOT 1.0 05/17/2019    BILITOT 0.5 01/22/2019    BILITOT 0.5 08/01/2018    ALKPHOS 92 05/17/2019    ALKPHOS 69 01/22/2019    ALKPHOS 98 08/01/2018     (H) 05/17/2019    ALT 35 01/22/2019    ALT 14 08/01/2018     (H) 05/17/2019    AST 41 01/22/2019    AST 20 08/01/2018    GLUCOSE 104 (H) 05/17/2019    GLUCOSE 128 (H) 01/22/2019    GLUCOSE 114 (H) 08/01/2018       Lab Results   Component Value Date    INR 0.97 04/05/2018    INR 0.95 03/15/2016    INR 1.00 04/28/2014       Body mass index is 25.29 kg/m². Patient's Body mass index is 25.29 kg/m². BMI is above normal parameters. Recommendations include: nutrition counseling schedule colonoscopy.    Assessment and Plan:  Assessment/Plan   Cayetano was seen today for colon cancer screening and hepatitis c.    Diagnoses and all orders for this visit:    Anemia, unspecified type  Comments:  Schedule colonoscopy  Orders:  -     Case Request; Standing  -     Case Request    Chronic hepatitis C without hepatic coma (CMS/HCC)  Comments:  Labs ordered as noted below  Orders:  -     HCV RNA By PCR, Qn Rfx Cassie; Future  -     HCV RNA DINORA QL RFX QT; Future  -     Hepatitis B Surface Antibody; Future  -     Ethanol; Future  -     Drug Screen (10), Serum; Future  -     US Liver; Future  -      HIV-1 & HIV-2 Antibodies; Future    Elevated liver enzymes    History of alcohol abuse    Other orders  -     Sod Picosulfate-Mag Ox-Cit Acd (CLENPIQ) 10-3.5-12 MG-GM -GM/160ML solution; Take 2 bottles by mouth Take As Directed.  -     Follow Anesthesia Guidelines / Standing Orders; Future  -     Obtain Informed Consent; Future  -     Implement Anesthesia Orders Day of Procedure; Standing  -     Obtain Informed Consent; Standing  -     Verify bowel prep was successful; Standing        The risks, benefits, and alternatives of colonoscopy were reviewed with the patient today.  Risks including perforation of the colon possibly requiring surgery or colostomy.  Additional risks include risk of bleeding from biopsies or removal of colon tissue.  There is also the risk of a drug reaction or problems with anesthesia.  This will be discussed with the further by the anesthesia team on the day of the procedure.  Lastly there is a possibility of missing a colon polyp or cancer.  The benefits include the diagnosis and management of disease of the colon and rectum.  Alternatives to colonoscopy include barium enema, laboratory testing, radiographic evaluation, or no intervention.  The patient verbalizes understanding and agrees.       Patient Instructions   Hepatitis C  Hepatitis C is a liver infection that is caused by a virus. Many people have no symptoms or only mild symptoms. Hepatitis C is contagious. This means that it can spread from person to person. You can get this disease through:  · Blood.  · Birth.  · Body fluids, like breast milk, tears, semen, vaginal fluids, and spit (saliva).  · Blood or organ donations done in the [place] before 1992.    Follow these instructions at home:  Medicines  · Take over-the-counter and prescription medicines only as told by your doctor.  · Take your antiviral medicine as told by your doctor. Do not stop taking the antiviral medicine even if you start to feel better.  · Do not take  any new medicines unless your doctor says that this is okay. This includes over-the-counter medicines and birth control pills.  Activity  · Rest when you feel tired.  · Do not have sex until your doctor says this is okay.  · Ask your doctor when you may go back to school or work.  Eating and drinking  · Eat a balanced diet. Eat plenty of:  ? Fruits and vegetables.  ? Whole grains.  ? Low-fat (lean) meats or non-meat proteins, such as beans or tofu.  · Drink enough fluids to keep your pee (urine) clear or pale yellow.  · Do not drink alcohol.  How is this prevented?  · Wash your hands often with soap and water. If you do not have soap and water, use hand .  · Do not share needles or syringes.  · Practice safe sex and use condoms.  · Do not handle blood or body fluids without gloves or other protection.  · Avoid getting tattoos or piercings in places that are not clean.  General instructions  · Do not share toothbrushes, nail clippers, or razors.  · Wash your hands often with soap and water. If you do not have soap and water, use hand .  · Cover any cuts or open sores on your skin.  · Keep all follow-up visits as told by your doctor. This is important. You may need follow-up visits every 6-12 months.  Contact a doctor if:  · You have a fever.  · You have belly (abdominal) pain.  · Your pee (urine) is dark.  · Your poop (bowel movement) is the color of josiah.  · You have joint pain.  Get help right away if:  · You feel more and more tired (fatigued).  · You feel more and more weak.  · You do not feel like eating.  · You cannot eat or drink without throwing up (vomiting).  · Your skin or the whites of your eyes turn yellow (jaundice) or turn more yellow than they were before.  · You bruise or bleed easily.  Summary  · Hepatitis C is a liver infection that is caused by a virus.  · The virus can be spread through blood and body fluids.  · Do not take any new medicines unless your doctor says that this is  okay.  · Wash your hands often with soap and water. This helps prevent infection.  This information is not intended to replace advice given to you by your health care provider. Make sure you discuss any questions you have with your health care provider.  Document Released: 11/30/2009 Document Revised: 01/23/2018 Document Reviewed: 01/23/2018  [employer] Patient Education © 2019 [employer]        Next follow-up appointment          EMR Dragon/Transcription disclaimer:  Much of this encounter note is an electronic transcription/translation of spoken language to printed text. The electronic translation of spoken language may permit erroneous, or at times, nonsensical words or phrases to be inadvertently transcribed; although I have reviewed the note for such errors, some may still exist.

## 2019-06-07 NOTE — H&P (VIEW-ONLY)
Chief Complaint:   Chief Complaint   Patient presents with   • Colon Cancer Screening   • Hepatitis C         Patient ID: Cayetano Curry is a 64 y.o. male     History of Present Illness:   Referred to our office for findings of hepatitis C and anemia. History of chronic leukemia diagnosed in 2003 followed by Dr. Kramer.    Labs on 5/24/2019: CBC unremarkable.  Negative hepatitis A negative hepatitis B positive hepatitis C. He tells me was treated in the past for Hep C with harvoni.  Records show the patient was initially seen in our office on 3/14/2016 for complaints of hepatitis C diagnosed in the 1990s.  The patient had not had any work-up for this.  He denied and continues to deny any history of IV drug use.  A liver biopsy performed 11/2014 revealed mild portal hypertension grade 2 stage 0.  He was found to have genotype 1a at that time as well.  The patient was seen back in the office on 4/8/2016 for final work-up of hepatitis C.  On 6/30/2016 he was seen in the office last he was being treated with Harvoni once per day for 8 weeks.  Patient stated that he had 1 week left of it.  After that visit there is no note of the patient following up after treatment.  Hepatitis C quant was ordered and HCV was not detected.     The patient denies any nausea, vomiting, epigastric pain, dysphagia, pyrosis or hematemesis.  The patient denies any fever or chills.  Denies any melena or hematochezia.  Denies any unintentional weight loss or loss of appetite.  He denies any jaundice, icterus or ascites. The patient states that he stopped drinking about 1 year ago. He drank about 1 case a day for about 30 years.     The patient's last colonoscopy was performed on 3/31/2016 found to be normal recall set for 10 years.  CBC on 5/17/2019 revealed a hemoglobin of 13.  CBC repeated on 5/24/2019 revealed a hemoglobin of 15.      Liver ultrasound 6/6/2019  IMPRESSION:  Mildly coarsened liver echogenicity, which can be seen with  chronic  liver disease.  This report was finalized on 06/06/2019 10:56 by Dr Rissa Salazar MD.    Past Medical History:   Diagnosis Date   • Chondromalacia    • Chronic myeloid leukemia (CMS/HCC)    • DJD (degenerative joint disease)    • GERD (gastroesophageal reflux disease)    • Hep C w/o coma, chronic (CMS/HCC)    • Leukopenia    • Lichen planus    • Normocytic normochromic anemia    • Restless leg syndrome    • Seasonal allergic rhinitis        Past Surgical History:   Procedure Laterality Date   • ANTERIOR CERVICAL FUSION     • CARPAL TUNNEL RELEASE Right    • COLONOSCOPY  03/31/2016    Normal; Repeat 10 years    • GANGLION CYST EXCISION     • KNEE ARTHROSCOPY Left    • KNEE ARTHROSCOPY Right    • LIVER BIOPSY  11/03/2004    Mild portal inflammation; No hepatic steatosis, portal fibrosis, cirrhosis, or metastatic malignancy; mild increase in stainable iron (3+); Changes consistent with Hep C, Scheuer grade 2, stage 0   • NECK SURGERY      To replace neck plate after a fall    • OTHER SURGICAL HISTORY      arm surgery with hardware   • REPLACEMENT TOTAL KNEE Left    • SPINAL FUSION           Current Outpatient Medications:   •  citalopram (CeleXA) 40 MG tablet, , Disp: , Rfl:   •  fluticasone (FLONASE) 50 MCG/ACT nasal spray, , Disp: , Rfl:   •  gabapentin (NEURONTIN) 300 MG capsule, , Disp: , Rfl:   •  GLEEVEC 100 MG chemo tablet, , Disp: , Rfl:   •  omeprazole (priLOSEC) 40 MG capsule, , Disp: , Rfl:   •  oxyCODONE-acetaminophen (PERCOCET)  MG per tablet, , Disp: , Rfl:   •  rOPINIRole (REQUIP) 0.5 MG tablet, , Disp: , Rfl:   •  Sod Picosulfate-Mag Ox-Cit Acd (CLENPIQ) 10-3.5-12 MG-GM -GM/160ML solution, Take 2 bottles by mouth Take As Directed., Disp: 2 bottle, Rfl: 0    No Known Allergies    Social History     Socioeconomic History   • Marital status:      Spouse name: Not on file   • Number of children: Not on file   • Years of education: Not on file   • Highest education level: Not on file   "  Tobacco Use   • Smoking status: Former Smoker   • Smokeless tobacco: Never Used   Substance and Sexual Activity   • Alcohol use: Yes     Comment: occ   • Drug use: No   • Sexual activity: Defer       Family History   Problem Relation Age of Onset   • Prostate cancer Father    • Colon cancer Neg Hx    • Colon polyps Neg Hx        Vitals:    06/07/19 0848   BP: 124/78   Pulse: 68   Temp: 97 °F (36.1 °C)   SpO2: 100%   Weight: 68.9 kg (152 lb)   Height: 165.1 cm (65\")       Review of Systems:    General:    Present -feeling well   Skin:    Not Present-Rash   HEENT:     Not Present-Acute visual changes or Acute hearing changes   Neck :    Not Present- swollen glands   Genitourinary:      Not Present- burning, frequency, urgency hematuria, dysuria,   Cardiovascular:   Not Present-chest pain, palpitations, or pressure   Respiratory:   Not Present- shortness of breath or cough   Gastrointestinal:  Musculoskeletal:  Neurological:  Psychiatric:   Present as mentioned in the HP    Not Present. Recent gait disturbances.    Not Present-Seizures and weakness in extremities.    Not Present- Anxiety or Depression.       Physical Exam:    General Appearance:    Alert, cooperative, in no acute distress   Psych:    Mood appropriate    Eyes:          conjunctivae and sclerae normal, no   icterus, no pallor   ENMT:    Ears appear intact with no abnormalities noted oral mucosa moist   Neck:   No adenopathy, supple, trachea midline, no thyromegaly, no   carotid bruit, no JVD    Cardiovascular:    Regular rhythm and normal rate, normal S1 and S2, no            murmur, no gallop, no rub, no click   Gastrointestinal:     Inspection normal.  Normal bowel sounds, no masses, no organomegaly, soft round non-tender, non-distended, no guarding, no rebound or tenderness. No hepatosplenomegaly.   Skin:   No bleeding, bruising or rash   Neurologic:   nonfocal       Lab Results   Component Value Date    WBC 6.99 05/24/2019    WBC 5.95 05/17/2019    " WBC 5.55 03/15/2016    HGB 15.0 05/24/2019    HGB 13.9 (L) 05/17/2019    HGB 10.3 (L) 04/21/2018    HCT 45.1 05/24/2019    HCT 41.8 05/17/2019    HCT 31.6 (L) 04/21/2018     05/24/2019     05/17/2019     03/15/2016        Lab Results   Component Value Date     05/17/2019     01/22/2019     08/01/2018    K 4.3 05/17/2019    K 4.7 01/22/2019    K 4.2 08/01/2018     05/17/2019    CL 99 01/22/2019     08/01/2018    CO2 27.0 05/17/2019    CO2 32.0 (H) 01/22/2019    CO2 27 08/01/2018    BUN 16 05/17/2019    BUN 14 01/22/2019    BUN 10 08/01/2018    CREATININE 1.05 05/17/2019    CREATININE 1.18 01/22/2019    CREATININE 0.9 08/01/2018    BILITOT 1.0 05/17/2019    BILITOT 0.5 01/22/2019    BILITOT 0.5 08/01/2018    ALKPHOS 92 05/17/2019    ALKPHOS 69 01/22/2019    ALKPHOS 98 08/01/2018     (H) 05/17/2019    ALT 35 01/22/2019    ALT 14 08/01/2018     (H) 05/17/2019    AST 41 01/22/2019    AST 20 08/01/2018    GLUCOSE 104 (H) 05/17/2019    GLUCOSE 128 (H) 01/22/2019    GLUCOSE 114 (H) 08/01/2018       Lab Results   Component Value Date    INR 0.97 04/05/2018    INR 0.95 03/15/2016    INR 1.00 04/28/2014       Body mass index is 25.29 kg/m². Patient's Body mass index is 25.29 kg/m². BMI is above normal parameters. Recommendations include: nutrition counseling schedule colonoscopy.    Assessment and Plan:  Assessment/Plan   Cayetano was seen today for colon cancer screening and hepatitis c.    Diagnoses and all orders for this visit:    Anemia, unspecified type  Comments:  Schedule colonoscopy  Orders:  -     Case Request; Standing  -     Case Request    Chronic hepatitis C without hepatic coma (CMS/HCC)  Comments:  Labs ordered as noted below  Orders:  -     HCV RNA By PCR, Qn Rfx Cassie; Future  -     HCV RNA DINORA QL RFX QT; Future  -     Hepatitis B Surface Antibody; Future  -     Ethanol; Future  -     Drug Screen (10), Serum; Future  -     US Liver; Future  -      HIV-1 & HIV-2 Antibodies; Future    Elevated liver enzymes    History of alcohol abuse    Other orders  -     Sod Picosulfate-Mag Ox-Cit Acd (CLENPIQ) 10-3.5-12 MG-GM -GM/160ML solution; Take 2 bottles by mouth Take As Directed.  -     Follow Anesthesia Guidelines / Standing Orders; Future  -     Obtain Informed Consent; Future  -     Implement Anesthesia Orders Day of Procedure; Standing  -     Obtain Informed Consent; Standing  -     Verify bowel prep was successful; Standing        The risks, benefits, and alternatives of colonoscopy were reviewed with the patient today.  Risks including perforation of the colon possibly requiring surgery or colostomy.  Additional risks include risk of bleeding from biopsies or removal of colon tissue.  There is also the risk of a drug reaction or problems with anesthesia.  This will be discussed with the further by the anesthesia team on the day of the procedure.  Lastly there is a possibility of missing a colon polyp or cancer.  The benefits include the diagnosis and management of disease of the colon and rectum.  Alternatives to colonoscopy include barium enema, laboratory testing, radiographic evaluation, or no intervention.  The patient verbalizes understanding and agrees.       Patient Instructions   Hepatitis C  Hepatitis C is a liver infection that is caused by a virus. Many people have no symptoms or only mild symptoms. Hepatitis C is contagious. This means that it can spread from person to person. You can get this disease through:  · Blood.  · Birth.  · Body fluids, like breast milk, tears, semen, vaginal fluids, and spit (saliva).  · Blood or organ donations done in the [place] before 1992.    Follow these instructions at home:  Medicines  · Take over-the-counter and prescription medicines only as told by your doctor.  · Take your antiviral medicine as told by your doctor. Do not stop taking the antiviral medicine even if you start to feel better.  · Do not take  any new medicines unless your doctor says that this is okay. This includes over-the-counter medicines and birth control pills.  Activity  · Rest when you feel tired.  · Do not have sex until your doctor says this is okay.  · Ask your doctor when you may go back to school or work.  Eating and drinking  · Eat a balanced diet. Eat plenty of:  ? Fruits and vegetables.  ? Whole grains.  ? Low-fat (lean) meats or non-meat proteins, such as beans or tofu.  · Drink enough fluids to keep your pee (urine) clear or pale yellow.  · Do not drink alcohol.  How is this prevented?  · Wash your hands often with soap and water. If you do not have soap and water, use hand .  · Do not share needles or syringes.  · Practice safe sex and use condoms.  · Do not handle blood or body fluids without gloves or other protection.  · Avoid getting tattoos or piercings in places that are not clean.  General instructions  · Do not share toothbrushes, nail clippers, or razors.  · Wash your hands often with soap and water. If you do not have soap and water, use hand .  · Cover any cuts or open sores on your skin.  · Keep all follow-up visits as told by your doctor. This is important. You may need follow-up visits every 6–12 months.  Contact a doctor if:  · You have a fever.  · You have belly (abdominal) pain.  · Your pee (urine) is dark.  · Your poop (bowel movement) is the color of josiah.  · You have joint pain.  Get help right away if:  · You feel more and more tired (fatigued).  · You feel more and more weak.  · You do not feel like eating.  · You cannot eat or drink without throwing up (vomiting).  · Your skin or the whites of your eyes turn yellow (jaundice) or turn more yellow than they were before.  · You bruise or bleed easily.  Summary  · Hepatitis C is a liver infection that is caused by a virus.  · The virus can be spread through blood and body fluids.  · Do not take any new medicines unless your doctor says that this is  okay.  · Wash your hands often with soap and water. This helps prevent infection.  This information is not intended to replace advice given to you by your health care provider. Make sure you discuss any questions you have with your health care provider.  Document Released: 11/30/2009 Document Revised: 01/23/2018 Document Reviewed: 01/23/2018  [employer] Patient Education © 2019 [employer]        Next follow-up appointment          EMR Dragon/Transcription disclaimer:  Much of this encounter note is an electronic transcription/translation of spoken language to printed text. The electronic translation of spoken language may permit erroneous, or at times, nonsensical words or phrases to be inadvertently transcribed; although I have reviewed the note for such errors, some may still exist.

## 2019-06-07 NOTE — PATIENT INSTRUCTIONS
Hepatitis C  Hepatitis C is a liver infection that is caused by a virus. Many people have no symptoms or only mild symptoms. Hepatitis C is contagious. This means that it can spread from person to person. You can get this disease through:  · Blood.  · Birth.  · Body fluids, like breast milk, tears, semen, vaginal fluids, and spit (saliva).  · Blood or organ donations done in the [place] before 1992.    Follow these instructions at home:  Medicines  · Take over-the-counter and prescription medicines only as told by your doctor.  · Take your antiviral medicine as told by your doctor. Do not stop taking the antiviral medicine even if you start to feel better.  · Do not take any new medicines unless your doctor says that this is okay. This includes over-the-counter medicines and birth control pills.  Activity  · Rest when you feel tired.  · Do not have sex until your doctor says this is okay.  · Ask your doctor when you may go back to school or work.  Eating and drinking  · Eat a balanced diet. Eat plenty of:  ? Fruits and vegetables.  ? Whole grains.  ? Low-fat (lean) meats or non-meat proteins, such as beans or tofu.  · Drink enough fluids to keep your pee (urine) clear or pale yellow.  · Do not drink alcohol.  How is this prevented?  · Wash your hands often with soap and water. If you do not have soap and water, use hand .  · Do not share needles or syringes.  · Practice safe sex and use condoms.  · Do not handle blood or body fluids without gloves or other protection.  · Avoid getting tattoos or piercings in places that are not clean.  General instructions  · Do not share toothbrushes, nail clippers, or razors.  · Wash your hands often with soap and water. If you do not have soap and water, use hand .  · Cover any cuts or open sores on your skin.  · Keep all follow-up visits as told by your doctor. This is important. You may need follow-up visits every 6-12 months.  Contact a doctor  if:  · You have a fever.  · You have belly (abdominal) pain.  · Your pee (urine) is dark.  · Your poop (bowel movement) is the color of josiah.  · You have joint pain.  Get help right away if:  · You feel more and more tired (fatigued).  · You feel more and more weak.  · You do not feel like eating.  · You cannot eat or drink without throwing up (vomiting).  · Your skin or the whites of your eyes turn yellow (jaundice) or turn more yellow than they were before.  · You bruise or bleed easily.  Summary  · Hepatitis C is a liver infection that is caused by a virus.  · The virus can be spread through blood and body fluids.  · Do not take any new medicines unless your doctor says that this is okay.  · Wash your hands often with soap and water. This helps prevent infection.  This information is not intended to replace advice given to you by your health care provider. Make sure you discuss any questions you have with your health care provider.  Document Released: 11/30/2009 Document Revised: 01/23/2018 Document Reviewed: 01/23/2018  [employer] Interactive Patient Education © 2019 [employer]

## 2019-06-12 DIAGNOSIS — B18.2 CHRONIC HEPATITIS C WITHOUT HEPATIC COMA (HCC): Primary | ICD-10-CM

## 2019-06-13 ENCOUNTER — HOSPITAL ENCOUNTER (OUTPATIENT)
Facility: HOSPITAL | Age: 64
Setting detail: HOSPITAL OUTPATIENT SURGERY
Discharge: HOME | End: 2019-06-13
Attending: INTERNAL MEDICINE | Admitting: INTERNAL MEDICINE

## 2019-06-13 ENCOUNTER — ANESTHESIA (OUTPATIENT)
Dept: GASTROENTEROLOGY | Facility: HOSPITAL | Age: 64
End: 2019-06-13

## 2019-06-13 ENCOUNTER — ANESTHESIA EVENT (OUTPATIENT)
Dept: GASTROENTEROLOGY | Facility: HOSPITAL | Age: 64
End: 2019-06-13

## 2019-06-13 VITALS
HEART RATE: 55 BPM | DIASTOLIC BLOOD PRESSURE: 74 MMHG | RESPIRATION RATE: 15 BRPM | OXYGEN SATURATION: 97 % | SYSTOLIC BLOOD PRESSURE: 111 MMHG | WEIGHT: 152 LBS | HEIGHT: 65 IN | BODY MASS INDEX: 25.33 KG/M2

## 2019-06-13 DIAGNOSIS — D64.9 ANEMIA, UNSPECIFIED TYPE: Primary | ICD-10-CM

## 2019-06-13 PROCEDURE — 45378 DIAGNOSTIC COLONOSCOPY: CPT | Performed by: INTERNAL MEDICINE

## 2019-06-13 PROCEDURE — 25010000002 PROPOFOL 10 MG/ML EMULSION: Performed by: NURSE ANESTHETIST, CERTIFIED REGISTERED

## 2019-06-13 RX ORDER — LIDOCAINE HYDROCHLORIDE 20 MG/ML
INJECTION, SOLUTION INFILTRATION; PERINEURAL AS NEEDED
Status: DISCONTINUED | OUTPATIENT
Start: 2019-06-13 | End: 2019-06-13 | Stop reason: SURG

## 2019-06-13 RX ORDER — PROPOFOL 10 MG/ML
VIAL (ML) INTRAVENOUS AS NEEDED
Status: DISCONTINUED | OUTPATIENT
Start: 2019-06-13 | End: 2019-06-13 | Stop reason: SURG

## 2019-06-13 RX ORDER — SODIUM CHLORIDE 9 MG/ML
500 INJECTION, SOLUTION INTRAVENOUS CONTINUOUS PRN
Status: DISCONTINUED | OUTPATIENT
Start: 2019-06-13 | End: 2019-06-13 | Stop reason: HOSPADM

## 2019-06-13 RX ORDER — SODIUM CHLORIDE 0.9 % (FLUSH) 0.9 %
3 SYRINGE (ML) INJECTION AS NEEDED
Status: DISCONTINUED | OUTPATIENT
Start: 2019-06-13 | End: 2019-06-13 | Stop reason: HOSPADM

## 2019-06-13 RX ADMIN — LIDOCAINE HYDROCHLORIDE 100 MG: 20 INJECTION, SOLUTION INFILTRATION; PERINEURAL at 10:23

## 2019-06-13 RX ADMIN — PROPOFOL 200 MG: 10 INJECTION, EMULSION INTRAVENOUS at 10:23

## 2019-06-13 RX ADMIN — SODIUM CHLORIDE 500 ML: 9 INJECTION, SOLUTION INTRAVENOUS at 09:09

## 2019-06-13 NOTE — INTERVAL H&P NOTE
H&P updated. The patient was examined and the following changes are noted:        Last colonoscopy was 3 years ago.  He is here for anemia but he does have history of leukemia.  Has had no prior endoscopy.

## 2019-06-13 NOTE — ANESTHESIA PREPROCEDURE EVALUATION
Anesthesia Evaluation     Patient summary reviewed   no history of anesthetic complications:  NPO Solid Status: > 8 hours             Airway   Mallampati: II  TM distance: >3 FB  Neck ROM: full  Dental      Pulmonary - negative pulmonary ROS   Cardiovascular - negative cardio ROS  Exercise tolerance: excellent (>7 METS)        Neuro/Psych- negative ROS  GI/Hepatic/Renal/Endo    (+)  GERD,  hepatitis C,     Musculoskeletal     Abdominal    Substance History      OB/GYN          Other                        Anesthesia Plan    ASA 2     MAC     Anesthetic plan, all risks, benefits, and alternatives have been provided, discussed and informed consent has been obtained with: patient.

## 2019-06-13 NOTE — ANESTHESIA POSTPROCEDURE EVALUATION
"Patient: Cayetano Curry    Procedure Summary     Date:  06/13/19 Room / Location:  [facility] ENDOSCOPY 2 / [facility] END[facility]    Anesthesia Start:  1016 Anesthesia Stop:  1042    Procedure:  COLONOSCOPY WITH ANESTHESIA (N/A ) Diagnosis:       Anemia, unspecified type      (Anemia, unspecified type [D64.9])    Surgeon:  Tori Rosas MD Provider:  Torsten Morris CRNA    Anesthesia Type:  MAC ASA Status:  2          Anesthesia Type: MAC  Last vitals  BP   122/77 (06/13/19 0852)   Temp       Pulse   55 (06/13/19 0852)   Resp   20 (06/13/19 0852)     SpO2         Post Anesthesia Care and Evaluation    Patient location during evaluation: PHASE II  Patient participation: complete - patient participated  Level of consciousness: awake and alert  Pain management: adequate  Airway patency: patent  Anesthetic complications: No anesthetic complications    Cardiovascular status: acceptable  Respiratory status: acceptable  Hydration status: acceptable    Comments: Blood pressure 122/77, pulse 55, resp. rate 20, height 165.1 cm (65\"), weight 68.9 kg (152 lb).    Pt discharged from PACU based on bashir score >8      "

## 2019-06-19 ENCOUNTER — ANESTHESIA (OUTPATIENT)
Dept: GASTROENTEROLOGY | Facility: HOSPITAL | Age: 64
End: 2019-06-19

## 2019-06-19 ENCOUNTER — HOSPITAL ENCOUNTER (OUTPATIENT)
Facility: HOSPITAL | Age: 64
Setting detail: HOSPITAL OUTPATIENT SURGERY
Discharge: HOME | End: 2019-06-19
Attending: INTERNAL MEDICINE | Admitting: INTERNAL MEDICINE

## 2019-06-19 ENCOUNTER — ANESTHESIA EVENT (OUTPATIENT)
Dept: GASTROENTEROLOGY | Facility: HOSPITAL | Age: 64
End: 2019-06-19

## 2019-06-19 VITALS
OXYGEN SATURATION: 98 % | SYSTOLIC BLOOD PRESSURE: 102 MMHG | HEIGHT: 65 IN | TEMPERATURE: 97.3 F | DIASTOLIC BLOOD PRESSURE: 79 MMHG | HEART RATE: 60 BPM | RESPIRATION RATE: 12 BRPM | WEIGHT: 147 LBS | BODY MASS INDEX: 24.49 KG/M2

## 2019-06-19 DIAGNOSIS — D64.9 ANEMIA, UNSPECIFIED TYPE: ICD-10-CM

## 2019-06-19 PROCEDURE — 25010000002 PROPOFOL 10 MG/ML EMULSION: Performed by: NURSE ANESTHETIST, CERTIFIED REGISTERED

## 2019-06-19 PROCEDURE — 43239 EGD BIOPSY SINGLE/MULTIPLE: CPT | Performed by: INTERNAL MEDICINE

## 2019-06-19 PROCEDURE — 88305 TISSUE EXAM BY PATHOLOGIST: CPT | Performed by: INTERNAL MEDICINE

## 2019-06-19 RX ORDER — SODIUM CHLORIDE 0.9 % (FLUSH) 0.9 %
3 SYRINGE (ML) INJECTION AS NEEDED
Status: DISCONTINUED | OUTPATIENT
Start: 2019-06-19 | End: 2019-06-19 | Stop reason: HOSPADM

## 2019-06-19 RX ORDER — SODIUM CHLORIDE 9 MG/ML
500 INJECTION, SOLUTION INTRAVENOUS CONTINUOUS PRN
Status: DISCONTINUED | OUTPATIENT
Start: 2019-06-19 | End: 2019-06-19 | Stop reason: HOSPADM

## 2019-06-19 RX ORDER — PROPOFOL 10 MG/ML
VIAL (ML) INTRAVENOUS AS NEEDED
Status: DISCONTINUED | OUTPATIENT
Start: 2019-06-19 | End: 2019-06-19 | Stop reason: SURG

## 2019-06-19 RX ORDER — LIDOCAINE HYDROCHLORIDE 20 MG/ML
INJECTION, SOLUTION INFILTRATION; PERINEURAL AS NEEDED
Status: DISCONTINUED | OUTPATIENT
Start: 2019-06-19 | End: 2019-06-19 | Stop reason: SURG

## 2019-06-19 RX ADMIN — SODIUM CHLORIDE 500 ML: 9 INJECTION, SOLUTION INTRAVENOUS at 12:28

## 2019-06-19 RX ADMIN — LIDOCAINE HYDROCHLORIDE 100 MG: 20 INJECTION, SOLUTION INFILTRATION; PERINEURAL at 13:16

## 2019-06-19 RX ADMIN — PROPOFOL 100 MG: 10 INJECTION, EMULSION INTRAVENOUS at 13:16

## 2019-06-19 NOTE — ANESTHESIA POSTPROCEDURE EVALUATION
Patient: Cayetano Curry    Procedure Summary     Date:  06/19/19 Room / Location:  [facility] ENDOSCOPY 6 / [facility]    Anesthesia Start:  1314 Anesthesia Stop:  1323    Procedure:  ESOPHAGOGASTRODUODENOSCOPY WITH ANESTHESIA (N/A ) Diagnosis:       Anemia, unspecified type      (Anemia, unspecified type [D64.9])    Surgeon:  Tori Rosas MD Provider:  Gerardo Richardson CRNA    Anesthesia Type:  MAC ASA Status:  2          Anesthesia Type: MAC  Last vitals  BP   114/73 (06/19/19 1335)   Temp   97.3 °F (36.3 °C) (06/19/19 1210)   Pulse   60 (06/19/19 1335)   Resp   13 (06/19/19 1335)     SpO2   97 % (06/19/19 1335)     Post Anesthesia Care and Evaluation    Patient location during evaluation: PHASE II  Level of consciousness: awake and alert  Pain management: adequate  Airway patency: patent  Anesthetic complications: No anesthetic complications    Cardiovascular status: acceptable  Respiratory status: acceptable  Hydration status: acceptable

## 2019-06-19 NOTE — ANESTHESIA PREPROCEDURE EVALUATION
Anesthesia Evaluation     Patient summary reviewed and Nursing notes reviewed   no history of anesthetic complications:  NPO Solid Status: > 8 hours  NPO Liquid Status: > 8 hours           Airway   Mallampati: I  TM distance: >3 FB  Neck ROM: full  No difficulty expected  Dental      Pulmonary    (-) asthma, sleep apnea, not a smoker  Cardiovascular   Exercise tolerance: good (4-7 METS)    (-) hypertension, CAD      Neuro/Psych  (-) seizures, TIA, CVA  GI/Hepatic/Renal/Endo    (+)  GERD,  hepatitis C, liver disease,   (-) diabetes    Musculoskeletal     Abdominal    Substance History      OB/GYN          Other   (+) blood dyscrasia (CML in remission), arthritis   history of cancer (CML) remission                    Anesthesia Plan    ASA 2     MAC     intravenous induction   Anesthetic plan, all risks, benefits, and alternatives have been provided, discussed and informed consent has been obtained with: patient.

## 2019-06-19 NOTE — INTERVAL H&P NOTE
H&P updated. The patient was examined and the following changes are noted:        Anoscopy was done last week and was unremarkable.  Presents now for endoscopy.  Also has some degree of liver disease.  Need to screen for varices.

## 2019-06-20 LAB
CYTO UR: NORMAL
LAB AP CASE REPORT: NORMAL
PATH REPORT.FINAL DX SPEC: NORMAL
PATH REPORT.GROSS SPEC: NORMAL

## 2019-06-28 ENCOUNTER — TELEPHONE (OUTPATIENT)
Dept: GASTROENTEROLOGY | Facility: CLINIC | Age: 64
End: 2019-06-28

## 2019-08-07 ENCOUNTER — TRANSCRIBE ORDERS (OUTPATIENT)
Dept: ONCOLOGY | Facility: CLINIC | Age: 64
End: 2019-08-07

## 2019-08-07 DIAGNOSIS — C92.10 CHRONIC MYELOID LEUKEMIA (HCC): Primary | ICD-10-CM

## 2019-08-23 ENCOUNTER — TELEPHONE (OUTPATIENT)
Dept: GASTROENTEROLOGY | Facility: CLINIC | Age: 64
End: 2019-08-23

## 2019-08-23 NOTE — TELEPHONE ENCOUNTER
Pt has not had hep c labs or US Liver completed and has not responded to our calls or letters. How would you like to proceed?

## 2019-09-06 ENCOUNTER — APPOINTMENT (OUTPATIENT)
Dept: LAB | Facility: HOSPITAL | Age: 64
End: 2019-09-06

## 2019-09-06 LAB
ALBUMIN SERPL-MCNC: 4.5 G/DL (ref 3.5–5)
ALBUMIN/GLOB SERPL: 1.3 G/DL (ref 1.1–2.5)
ALP SERPL-CCNC: 75 U/L (ref 24–120)
ALT SERPL W P-5'-P-CCNC: 28 U/L (ref 0–54)
ANION GAP SERPL CALCULATED.3IONS-SCNC: 7 MMOL/L (ref 4–13)
AST SERPL-CCNC: 41 U/L (ref 7–45)
BASOPHILS # BLD AUTO: 0.05 10*3/MM3 (ref 0–0.2)
BASOPHILS NFR BLD AUTO: 0.5 % (ref 0–1.5)
BILIRUB SERPL-MCNC: 1 MG/DL (ref 0.1–1)
BUN BLD-MCNC: 14 MG/DL (ref 5–21)
BUN/CREAT SERPL: 13.3 (ref 7–25)
CALCIUM SPEC-SCNC: 9.8 MG/DL (ref 8.4–10.4)
CHLORIDE SERPL-SCNC: 101 MMOL/L (ref 98–110)
CO2 SERPL-SCNC: 32 MMOL/L (ref 24–31)
CREAT BLD-MCNC: 1.05 MG/DL (ref 0.5–1.4)
DEPRECATED RDW RBC AUTO: 46.4 FL (ref 37–54)
EOSINOPHIL # BLD AUTO: 0.08 10*3/MM3 (ref 0–0.4)
EOSINOPHIL NFR BLD AUTO: 0.8 % (ref 0.3–6.2)
ERYTHROCYTE [DISTWIDTH] IN BLOOD BY AUTOMATED COUNT: 13.9 % (ref 12.3–15.4)
FERRITIN SERPL-MCNC: 96.4 NG/ML (ref 17.9–464)
GFR SERPL CREATININE-BSD FRML MDRD: 72 ML/MIN/1.73
GLOBULIN UR ELPH-MCNC: 3.4 GM/DL
GLUCOSE BLD-MCNC: 110 MG/DL (ref 70–100)
HCT VFR BLD AUTO: 44 % (ref 37.5–51)
HGB BLD-MCNC: 14.9 G/DL (ref 13–17.7)
IMM GRANULOCYTES # BLD AUTO: 0.04 10*3/MM3 (ref 0–0.05)
IMM GRANULOCYTES NFR BLD AUTO: 0.4 % (ref 0–0.5)
IRON 24H UR-MRATE: 168 MCG/DL (ref 42–180)
IRON SATN MFR SERPL: 47 % (ref 20–45)
LYMPHOCYTES # BLD AUTO: 1.6 10*3/MM3 (ref 0.7–3.1)
LYMPHOCYTES NFR BLD AUTO: 15.4 % (ref 19.6–45.3)
MCH RBC QN AUTO: 30.6 PG (ref 26.6–33)
MCHC RBC AUTO-ENTMCNC: 33.9 G/DL (ref 31.5–35.7)
MCV RBC AUTO: 90.3 FL (ref 79–97)
MONOCYTES # BLD AUTO: 0.71 10*3/MM3 (ref 0.1–0.9)
MONOCYTES NFR BLD AUTO: 6.8 % (ref 5–12)
NEUTROPHILS # BLD AUTO: 7.89 10*3/MM3 (ref 1.7–7)
NEUTROPHILS NFR BLD AUTO: 76.1 % (ref 42.7–76)
NRBC BLD AUTO-RTO: 0 /100 WBC (ref 0–0.2)
PHOSPHATE SERPL-MCNC: 3.1 MG/DL (ref 2.5–4.5)
PLATELET # BLD AUTO: 279 10*3/MM3 (ref 140–450)
PMV BLD AUTO: 9.5 FL (ref 6–12)
POTASSIUM BLD-SCNC: 4 MMOL/L (ref 3.5–5.3)
PROT SERPL-MCNC: 7.9 G/DL (ref 6.3–8.7)
RBC # BLD AUTO: 4.87 10*6/MM3 (ref 4.14–5.8)
SODIUM BLD-SCNC: 140 MMOL/L (ref 135–145)
TIBC SERPL-MCNC: 355 MCG/DL (ref 225–420)
WBC NRBC COR # BLD: 10.37 10*3/MM3 (ref 3.4–10.8)

## 2019-09-06 PROCEDURE — 81206 BCR/ABL1 GENE MAJOR BP: CPT

## 2019-09-06 PROCEDURE — 80053 COMPREHEN METABOLIC PANEL: CPT | Performed by: INTERNAL MEDICINE

## 2019-09-06 PROCEDURE — 82728 ASSAY OF FERRITIN: CPT | Performed by: INTERNAL MEDICINE

## 2019-09-06 PROCEDURE — 83550 IRON BINDING TEST: CPT | Performed by: INTERNAL MEDICINE

## 2019-09-06 PROCEDURE — 83540 ASSAY OF IRON: CPT | Performed by: INTERNAL MEDICINE

## 2019-09-06 PROCEDURE — 81207 BCR/ABL1 GENE MINOR BP: CPT

## 2019-09-06 PROCEDURE — 36415 COLL VENOUS BLD VENIPUNCTURE: CPT | Performed by: INTERNAL MEDICINE

## 2019-09-06 PROCEDURE — 85025 COMPLETE CBC W/AUTO DIFF WBC: CPT | Performed by: INTERNAL MEDICINE

## 2019-09-06 PROCEDURE — 84100 ASSAY OF PHOSPHORUS: CPT | Performed by: INTERNAL MEDICINE

## 2019-09-09 RX ORDER — FERROUS SULFATE 325(65) MG
TABLET ORAL
Qty: 60 TABLET | Refills: 2 | Status: SHIPPED | OUTPATIENT
Start: 2019-09-09 | End: 2022-02-07 | Stop reason: ALTCHOICE

## 2019-09-14 PROBLEM — C92.10 CML (CHRONIC MYELOCYTIC LEUKEMIA) (HCC): Status: ACTIVE | Noted: 2019-09-14

## 2019-09-22 PROBLEM — K21.9 GERD (GASTROESOPHAGEAL REFLUX DISEASE): Status: ACTIVE | Noted: 2019-09-22

## 2019-09-22 PROBLEM — B18.2 HEP C W/O COMA, CHRONIC: Status: ACTIVE | Noted: 2019-09-22

## 2019-09-23 ENCOUNTER — OFFICE VISIT (OUTPATIENT)
Dept: ONCOLOGY | Facility: CLINIC | Age: 64
End: 2019-09-23

## 2019-09-23 VITALS
RESPIRATION RATE: 16 BRPM | DIASTOLIC BLOOD PRESSURE: 86 MMHG | TEMPERATURE: 97.9 F | SYSTOLIC BLOOD PRESSURE: 138 MMHG | WEIGHT: 149.7 LBS | BODY MASS INDEX: 24.94 KG/M2 | HEART RATE: 76 BPM | OXYGEN SATURATION: 98 % | HEIGHT: 65 IN

## 2019-09-23 DIAGNOSIS — K21.9 GASTROESOPHAGEAL REFLUX DISEASE, ESOPHAGITIS PRESENCE NOT SPECIFIED: ICD-10-CM

## 2019-09-23 DIAGNOSIS — C92.10 CML (CHRONIC MYELOCYTIC LEUKEMIA) (HCC): Primary | ICD-10-CM

## 2019-09-23 DIAGNOSIS — D64.9 ANEMIA, UNSPECIFIED TYPE: ICD-10-CM

## 2019-09-23 DIAGNOSIS — B18.2 HEP C W/O COMA, CHRONIC (HCC): ICD-10-CM

## 2019-09-23 PROCEDURE — 99214 OFFICE O/P EST MOD 30 MIN: CPT | Performed by: NURSE PRACTITIONER

## 2019-09-23 NOTE — PROGRESS NOTES
[facility]  HEMATOLOGY & ONCOLOGY    W ONC [facility] HEMATOLOGY AND ONCOLOGY  [address]  [place] [address] [phone]    Patient Name: Cayetano Curry  Encounter Date: 09/23/2019  YOB: 1955  Patient Number: [id number]    Subjective     VISIT DIAGNOSIS:   Encounter Diagnoses   Name Primary?   • CML (chronic myelocytic leukemia) (CMS/HCC) Yes   • Anemia, unspecified type    • Hep C w/o coma, chronic (CMS/HCC)    • Gastroesophageal reflux disease, esophagitis presence not specified        REASON FOR VISIT:     Chief Complaint   Patient presents with   • CML     Here for followup. Still tolerating Gleevac well,    • Depression Screening     Minimal Depression        Problem List Items Addressed This Visit        Digestive    Hep C w/o coma, chronic (CMS/HCC)    GERD (gastroesophageal reflux disease)       Hematopoietic and Hemostatic    Anemia    Overview     Added automatically from request for surgery [id number]         Relevant Orders    CBC & Differential    Comprehensive Metabolic Panel    Iron Profile    Vitamin B12 & Folate    Ferritin    CML (chronic myelocytic leukemia) (CMS/HCC) - Primary    Relevant Orders    CBC & Differential    Comprehensive Metabolic Panel    Iron Profile    Vitamin B12 & Folate    Ferritin    BCR-ABL1, CML / ALL, PCR, Quant          Cancer Staging Information:  Cancer Staging  CML (chronic myelocytic leukemia) (CMS/HCC)  Staging form: Chronic Myeloid Leukemia (Cml, AJCC 8th Edition  - Clinical stage from 9/14/2019: Bone marrow blast count (%): 3, Additional clonal changes: Absent - Signed by Klever Ferreira MD on 9/14/2019      Oncology/Hematology History    DIAGNOSTIC ABNORMALITIES:  CBC, 12/10/2003. Total WBC of 135,000 with 28% segs, 40% bands, 8% metamyelocytes, 9% myelocytes, 1% blasts, and 2% nucleated RBC. Hemoglobin of 14, a hematocrit of 43.2, and platelet count of  146,000.  Leukocyte alkaline phosphatase, 12/10/2003. 19 (13 to 140).  Peripheral smear, 12/10/2003. Marked leukocytosis with entire spectrum of granulocytic precursors from myeloblasts (1%) to PMNs. Occasional nucleated red blood cell. Mild anisocytosis and poikilocytosis. Adequate platelets.  Marrow biopsy, 12/10/2003. Hypercellular, packed bone marrow.  Marked granulocytic hyperplasia with all stages of maturation including 3% blasts. 1+ stainable iron. Occasional dyspoietic megakaryocytes with normal numbers. Cytometry reveals aberrant CD56 expression on myeloid cells. Cytogenetic studies pending.  Ferritin, B12, and LDH, 12/11/2003. 1309, greater than 2000, and 1963 (all grossly elevated).  Liver/spleen scan, 12/11/2003. Abnormally decreased activity within the spleen. Liver parenchyma shows normal homogenous activity without focal masses. Mild hepatomegaly. No colloid shift.  Chest x-ray, 12/11/2003. Normal.  PREVIOUS INTERVENTIONS:  Gleevec, 12/26/2003 through present. Varying doses. Held 01/30/2004 through 02/27/2004.  Held again (), 12/14/2006 through 12/18/2006.        CML (chronic myelocytic leukemia) (CMS/HCC)    12/10/2003 Initial Diagnosis     CML (chronic myelocytic leukemia) (CMS/HCC)            INTERVAL HISTORY  Patient ID: Cayetano Curry is a 64 y.o. year old male in follow-up of his chronic phase chronic myelogenous leukemia (CML).  It has been 185 months since initiation of Gleevec. The patient is here alone.  History is from the patient who is considered to be a reliable historian.    His labs as of 9/8/2019 showed a white count 10.3, hemoglobin 14.9, hematocrit 44.0 and platelet count 279,000.  CMP is normal.  Iron saturation is 47% with a ferritin of 96.40.  BCR ABL transcripts continue to be negative at the level <0.0032 %    which is the sensitivity limit of the assay.      Presents today    Past Medicalkkkk History:   Past Medical History:   Diagnosis Date   • Chondromalacia    •  Chronic myeloid leukemia (CMS/HCC)    • DJD (degenerative joint disease)    • GERD (gastroesophageal reflux disease)    • Hep C w/o coma, chronic (CMS/HCC)    • Leukopenia    • Lichen planus    • Normocytic normochromic anemia    • Restless leg syndrome    • Seasonal allergic rhinitis      Past Surgical History:   Past Surgical History:   Procedure Laterality Date   • ANTERIOR CERVICAL FUSION     • CARPAL TUNNEL RELEASE Right    • COLONOSCOPY  2016    Normal; Repeat 10 years    • COLONOSCOPY N/A 2019    Procedure: COLONOSCOPY WITH ANESTHESIA;  Surgeon: Tori Rosas MD;  Location: [facility] END[facility]COPY;  Service: Gastroenterology   • ENDOSCOPY N/A 2019    Procedure: ESOPHAGOGASTRODUODENOSCOPY WITH ANESTHESIA;  Surgeon: Tori Rosas MD;  Location: [facility] ENDOSCOPY;  Service: Gastroenterology   • GANGLION CYST EXCISION     • KNEE ARTHROSCOPY Left    • KNEE ARTHROSCOPY Right    • LIVER BIOPSY  2004    Mild portal inflammation; No hepatic steatosis, portal fibrosis, cirrhosis, or metastatic malignancy; mild increase in stainable iron (3+); Changes consistent with Hep C, Scheuer grade 2, stage 0   • NECK SURGERY      To replace neck plate after a fall    • ORIF HUMERUS FRACTURE Left    • OTHER SURGICAL HISTORY      arm surgery with hardware   • REPLACEMENT TOTAL KNEE Left    • SPINAL FUSION       Social History:   Social History     Socioeconomic History   • Marital status:      Spouse name: Not on file   • Number of children: Not on file   • Years of education: Not on file   • Highest education level: Not on file   Tobacco Use   • Smoking status: Former Smoker     Last attempt to quit: 1975     Years since quittin.2   • Smokeless tobacco: Never Used   Substance and Sexual Activity   • Alcohol use: Yes     Comment: occ   • Drug use: No   • Sexual activity: Defer     Family History:   Family History   Problem Relation Age of Onset   • Prostate cancer Father    • No Known Problems  Mother    • No Known Problems Maternal Grandmother    • No Known Problems Maternal Grandfather    • No Known Problems Paternal Grandmother    • No Known Problems Paternal Grandfather    • Colon cancer Neg Hx    • Colon polyps Neg Hx        Review of Systems   Constitutional: Negative for activity change, appetite change, chills, diaphoresis, fatigue, fever and unexpected weight change.   HENT: Negative for congestion, facial swelling, mouth sores, nosebleeds, sore throat, trouble swallowing and voice change.    Eyes: Negative for discharge and redness.   Respiratory: Negative for cough, chest tightness, shortness of breath and wheezing.    Cardiovascular: Negative for chest pain, palpitations and leg swelling.   Gastrointestinal: Negative for abdominal distention, abdominal pain, blood in stool, constipation, diarrhea, nausea and vomiting.   Endocrine: Negative.    Genitourinary: Negative for difficulty urinating, dysuria, frequency, hematuria and urgency.   Musculoskeletal: Negative for arthralgias, gait problem and myalgias.   Skin: Negative for color change and rash.   Neurological: Negative for dizziness, weakness, light-headedness, numbness and headaches.   Hematological: Negative for adenopathy. Does not bruise/bleed easily.   Psychiatric/Behavioral: Negative for confusion and sleep disturbance. The patient is not nervous/anxious.         Medications:    Current Outpatient Medications   Medication Sig Dispense Refill   • citalopram (CeleXA) 40 MG tablet      • fluticasone (FLONASE) 50 MCG/ACT nasal spray      • gabapentin (NEURONTIN) 300 MG capsule      • GLEEVEC 100 MG chemo tablet      • omeprazole (priLOSEC) 40 MG capsule      • oxyCODONE-acetaminophen (PERCOCET)  MG per tablet      • rOPINIRole (REQUIP) 0.5 MG tablet      • FEROSUL 325 (65 Fe) MG tablet TAKE 1 TABLET BY MOUTH TWICE DAILY 60 tablet 2     No current facility-administered medications for this visit.        ALLERGIES:  No Known  "Allergies    Objective      Vitals:    09/23/19 1049   BP: 138/86   Pulse: 76   Resp: 16   Temp: 97.9 °F (36.6 °C)   TempSrc: Temporal   SpO2: 98%   Weight: 67.9 kg (149 lb 11.2 oz)   Height: 165.1 cm (65\")   PainSc: 0-No pain     /86   Pulse 76   Temp 97.9 °F (36.6 °C) (Temporal)   Resp 16   Ht 165.1 cm (65\")   Wt 67.9 kg (149 lb 11.2 oz)   SpO2 98%   BMI 24.91 kg/m²     Current Status 9/23/2019   ECOG score 0       PHYSICAL EXAM:  General Appearance:    Alert, cooperative, well nourished in no distress   Head:    Normocephalic, without obvious abnormality, atraumatic   Eyes:    PERRL, conjunctiva pink, sclera clear, EOM's intact   Ears:    Not examined   Nose:   Nares normal, septum midline, mucosa normal, no drainage    Throat:   Lips, mucosa, and tongue normal;mucous membranes moist   Neck:   Supple, Trachea midline       Lungs:     Clear to auscultation bilaterally, respirations unlabored   Chest Wall:    No tenderness or deformity    Heart:    Regular rate and rhythm, no murmur, rub or gallop   Abdomen:     Soft, non-tender, bowel sounds active all four quadrants,     no masses, no organomegaly   Extremities:   Extremities without cyanosis or edema       Skin:   Skin color, texture, turgor normal, no rashes or lesions   Lymph nodes:   Cervical, supraclavicular, and axillary nodes normal   Neurologic:   Grossly nonfocal, gait coordinated and smooth, cognition is   preserved.     LABS    Lab Results - Last 18 Months   Lab Units 09/06/19  1221 05/24/19  1550 05/17/19  1255 04/21/18  0222   HEMOGLOBIN g/dL 14.9 15.0 13.9* 10.3*   HEMATOCRIT % 44.0 45.1 41.8 31.6*   MCV fL 90.3 92.2 90.1  --    WBC 10*3/mm3 10.37 6.99 5.95  --    RDW % 13.9 13.8 13.9  --    MPV fL 9.5 10.2 9.8  --    PLATELETS 10*3/mm3 279 263 220  --    IMM GRAN % % 0.4 0.3 0.0  --    NEUTROS ABS 10*3/mm3 7.89* 3.42 2.68  --    LYMPHS ABS 10*3/mm3 1.60 2.33 1.92  --    MONOS ABS 10*3/mm3 0.71 0.69 0.74  --    EOS ABS 10*3/mm3 0.08 " 0.45 0.52  --    BASOS ABS 10*3/mm3 0.05 0.08 0.09  --    IMMATURE GRANS (ABS) 10*3/mm3 0.04 0.02 0.00  --    NRBC /100 WBC 0.0 0.0 0.0  --        Lab Results - Last 18 Months   Lab Units 09/06/19  1221 05/17/19  1255 01/22/19  1600 08/01/18  1551   GLUCOSE mg/dL 110* 104* 128* 114*   SODIUM mmol/L 140 140 141 140   POTASSIUM mmol/L 4.0 4.3 4.7 4.2   TOTAL CO2 mmol/L  --   --   --  27   CO2 mmol/L 32.0* 27.0 32.0*  --    CHLORIDE mmol/L 101 104 99 101   ANION GAP mmol/L 7.0 9.0 10.0 12   CREATININE mg/dL 1.05 1.05 1.18 0.9   BUN mg/dL 14 16 14 10   BUN / CREAT RATIO  13.3 15.2 11.9  --    CALCIUM mg/dL 9.8 9.0 9.4 9.1   EGFR IF NONAFRICN AM mL/min/1.73 72 72 63 >60   ALK PHOS U/L 75 92 69 98   TOTAL PROTEIN g/dL 7.9 7.4 6.7 6.9   ALT (SGPT) U/L 28 228* 35 14   AST (SGOT) U/L 41 199* 41 20   BILIRUBIN mg/dL 1.0 1.0 0.5 0.5   ALBUMIN g/dL 4.50 4.70 4.00 4.1   GLOBULIN gm/dL 3.4 2.7 2.7  --        Lab Results - Last 18 Months   Lab Units 09/06/19  1221 05/17/19  1255 01/22/19  1600   IRON mcg/dL 168 90 112   TIBC mcg/dL 355 340 328   IRON SATURATION % 47* 26 34   FERRITIN ng/mL 96.40 144.00 69.80         Assessment/Plan     Patient Active Problem List   Diagnosis   • Anemia   • CML (chronic myelocytic leukemia) (CMS/HCC)   • Hep C w/o coma, chronic (CMS/HCC)   • GERD (gastroesophageal reflux disease)        1. CML (chronic myelocytic leukemia) (CMS/HCC)  Chronic myelogenous leukemia (CML):   Stage:  Chronic phase.   Tumor burden:  Marrow involvement.  No hepatosplenomegaly.   Complications of tumor:  None.  No manifestations of hyperviscosity.   Tolerance to therapy:  Cytopenias and liver function test (LFT) abnormalities.   Current Status:  Cytogenetic remission, with hematologic remission.  No detectable disease (bone marrow biopsy, 3/30/2007 - no BCR-ABL fusion transcript was detected by real-time PCR) on current dose of Gleevec.      Continues to be NEGATIVE for the BCR-ABL1 e1a2 (p190), e13a2 (b2a2, p210) and  e14a2   (b3a2, p210) fusion transcripts as of 9/6/19.    Mr. castaneda is continuing to be stable on his current dosing of Gleevec.  He is on 100 mg daily.  His BCR ABL continues to be undetectable.  He continues to be asymptomatic.  Labs are otherwise stable.  He will continue the same.    2. Anemia, unspecified type  Normocytic anemia currently resolved with a hemoglobin of 14.9 and hematocrit of 44.0 today., Gleevec associated. (prior range:  Hgb 12.6 - 15).  His substrate analysis on 9/6/2019 shows an iron saturation of 47% with a ferritin of 96.4.  We will continue to follow.    3. Hep C w/o coma, chronic (CMS/HCC)  Hepatitis C with chronic elevation of liver function tests (LFTs).     Previous liver biopsy negative for cirrhosis or malignancy.     Colonoscopy 03/31/2016.  The entire examined colon appeared normal on direct and retroflexion views.    Completed hepatitis C Rx (Harvoni) beginning 05/13/2016 (daily x8 weeks).  Followed for gastroenterology (GI)  by Dr. Rosas.   Recurrent transaminitis, 05/17/2019 now currently resolved.  Transaminases as well as alkaline phosphatase normal as of 9/6/2019.    4. Gastroesophageal reflux disease, esophagitis presence not specified  GERD symptoms are stable on current medication regimen. Encouraged patient to follow with PCP and/or GI for management.       PLAN  1. Continue Gleevec 300 mg daily ([facility] mails to him).  2. Return to the [place] office with pre-office RT-PCR for BCR-ABL, serum iron, Fe saturation, ferritin, CMP, phosphorus, and     CBC with differential in 16 weeks.  3. Continue ongoing management per primary care physician and other specialists.  4. Call with any problems    Orders Placed This Encounter   Procedures   • Comprehensive Metabolic Panel     Standing Status:   Future   • Iron Profile     Standing Status:   Future     Standing Expiration Date:   9/23/2020   • Vitamin B12 & Folate     Standing Status:   Future   • Ferritin     Standing Status:    Future     Standing Expiration Date:   9/23/2020   • BCR-ABL1, CML / ALL, PCR, Quant     Standing Status:   Future     Standing Expiration Date:   9/24/2020   • CBC & Differential     Standing Status:   Future       I have spent a total of  _25_  minutes in face-to-face encounter with the patient, out of which more than 50% was counseling the patient and family regarding coordination of care.  Counseling included but not limited to time spent reviewing labs, treatment plan as well as answering questions.     All activities occurring within this visit are in accordance with the plan of care as set forth by Dr. Klever Ferreira.    Xuan Fernando, APRN    9/23/2019    9:13 AM

## 2020-03-11 ENCOUNTER — TELEPHONE (OUTPATIENT)
Dept: ONCOLOGY | Facility: CLINIC | Age: 65
End: 2020-03-11

## 2020-03-11 NOTE — TELEPHONE ENCOUNTER
Tried calling patient and left message if patient could come at 9:45 to see Dr. Ferreira on 3/23/20.

## 2020-03-16 ENCOUNTER — LAB (OUTPATIENT)
Dept: LAB | Facility: HOSPITAL | Age: 65
End: 2020-03-16

## 2020-03-16 DIAGNOSIS — D64.9 ANEMIA, UNSPECIFIED TYPE: ICD-10-CM

## 2020-03-16 DIAGNOSIS — C92.10 CML (CHRONIC MYELOCYTIC LEUKEMIA) (HCC): ICD-10-CM

## 2020-03-16 LAB
ALBUMIN SERPL-MCNC: 4.2 G/DL (ref 3.5–5.2)
ALBUMIN/GLOB SERPL: 1.6 G/DL
ALP SERPL-CCNC: 69 U/L (ref 39–117)
ALT SERPL W P-5'-P-CCNC: 22 U/L (ref 1–41)
ANION GAP SERPL CALCULATED.3IONS-SCNC: 11 MMOL/L (ref 5–15)
AST SERPL-CCNC: 27 U/L (ref 1–40)
BASOPHILS # BLD AUTO: 0.05 10*3/MM3 (ref 0–0.2)
BASOPHILS NFR BLD AUTO: 0.7 % (ref 0–1.5)
BILIRUB SERPL-MCNC: 0.6 MG/DL (ref 0.2–1.2)
BUN BLD-MCNC: 11 MG/DL (ref 8–23)
BUN/CREAT SERPL: 9.7 (ref 7–25)
CALCIUM SPEC-SCNC: 8.9 MG/DL (ref 8.6–10.5)
CHLORIDE SERPL-SCNC: 100 MMOL/L (ref 98–107)
CO2 SERPL-SCNC: 28 MMOL/L (ref 22–29)
CREAT BLD-MCNC: 1.13 MG/DL (ref 0.76–1.27)
DEPRECATED RDW RBC AUTO: 47.1 FL (ref 37–54)
EOSINOPHIL # BLD AUTO: 0.35 10*3/MM3 (ref 0–0.4)
EOSINOPHIL NFR BLD AUTO: 4.7 % (ref 0.3–6.2)
ERYTHROCYTE [DISTWIDTH] IN BLOOD BY AUTOMATED COUNT: 14 % (ref 12.3–15.4)
FERRITIN SERPL-MCNC: 127.6 NG/ML (ref 30–400)
FOLATE SERPL-MCNC: 12.1 NG/ML (ref 4.78–24.2)
GFR SERPL CREATININE-BSD FRML MDRD: 66 ML/MIN/1.73
GLOBULIN UR ELPH-MCNC: 2.6 GM/DL
GLUCOSE BLD-MCNC: 115 MG/DL (ref 65–99)
HCT VFR BLD AUTO: 44 % (ref 37.5–51)
HGB BLD-MCNC: 14.8 G/DL (ref 13–17.7)
IMM GRANULOCYTES # BLD AUTO: 0.01 10*3/MM3 (ref 0–0.05)
IMM GRANULOCYTES NFR BLD AUTO: 0.1 % (ref 0–0.5)
IRON 24H UR-MRATE: 115 MCG/DL (ref 59–158)
IRON SATN MFR SERPL: 31 % (ref 20–50)
LYMPHOCYTES # BLD AUTO: 2.03 10*3/MM3 (ref 0.7–3.1)
LYMPHOCYTES NFR BLD AUTO: 27.2 % (ref 19.6–45.3)
MCH RBC QN AUTO: 30.7 PG (ref 26.6–33)
MCHC RBC AUTO-ENTMCNC: 33.6 G/DL (ref 31.5–35.7)
MCV RBC AUTO: 91.3 FL (ref 79–97)
MONOCYTES # BLD AUTO: 0.58 10*3/MM3 (ref 0.1–0.9)
MONOCYTES NFR BLD AUTO: 7.8 % (ref 5–12)
NEUTROPHILS # BLD AUTO: 4.43 10*3/MM3 (ref 1.7–7)
NEUTROPHILS NFR BLD AUTO: 59.5 % (ref 42.7–76)
NRBC BLD AUTO-RTO: 0 /100 WBC (ref 0–0.2)
PLATELET # BLD AUTO: 235 10*3/MM3 (ref 140–450)
PMV BLD AUTO: 9.7 FL (ref 6–12)
POTASSIUM BLD-SCNC: 3.9 MMOL/L (ref 3.5–5.2)
PROT SERPL-MCNC: 6.8 G/DL (ref 6–8.5)
RBC # BLD AUTO: 4.82 10*6/MM3 (ref 4.14–5.8)
SODIUM BLD-SCNC: 139 MMOL/L (ref 136–145)
TIBC SERPL-MCNC: 374 MCG/DL (ref 298–536)
TRANSFERRIN SERPL-MCNC: 251 MG/DL (ref 200–360)
VIT B12 BLD-MCNC: 509 PG/ML (ref 211–946)
WBC NRBC COR # BLD: 7.45 10*3/MM3 (ref 3.4–10.8)

## 2020-03-16 PROCEDURE — 84466 ASSAY OF TRANSFERRIN: CPT

## 2020-03-16 PROCEDURE — 82746 ASSAY OF FOLIC ACID SERUM: CPT

## 2020-03-16 PROCEDURE — 85025 COMPLETE CBC W/AUTO DIFF WBC: CPT

## 2020-03-16 PROCEDURE — 82607 VITAMIN B-12: CPT

## 2020-03-16 PROCEDURE — 83540 ASSAY OF IRON: CPT

## 2020-03-16 PROCEDURE — 80053 COMPREHEN METABOLIC PANEL: CPT

## 2020-03-16 PROCEDURE — 36415 COLL VENOUS BLD VENIPUNCTURE: CPT

## 2020-03-16 PROCEDURE — 82728 ASSAY OF FERRITIN: CPT

## 2020-03-23 ENCOUNTER — TELEPHONE (OUTPATIENT)
Dept: ONCOLOGY | Facility: CLINIC | Age: 65
End: 2020-03-23

## 2020-03-23 NOTE — TELEPHONE ENCOUNTER
Patient was wanting to re-schedule appointment for today. Appears already cancelled and already had labs on 3/16. Said has a head cold but negative for COVID-19 travel screening. Was not sure when we wanted to schedule as patient already had labs so sending to [place] to schedule. Thanks    Callback#: [phone]

## 2020-04-20 RX ORDER — IMATINIB MESYLATE 100 MG/1
300 TABLET ORAL DAILY
Qty: 270 TABLET | Refills: 1 | Status: SHIPPED | OUTPATIENT
Start: 2020-04-20 | End: 2020-04-21 | Stop reason: SDUPTHER

## 2020-04-21 RX ORDER — IMATINIB MESYLATE 100 MG/1
300 TABLET ORAL DAILY
Qty: 270 TABLET | Refills: 1 | Status: SHIPPED | OUTPATIENT
Start: 2020-04-21 | End: 2020-11-18

## 2020-07-01 ENCOUNTER — LAB (OUTPATIENT)
Dept: LAB | Facility: HOSPITAL | Age: 65
End: 2020-07-01

## 2020-07-01 DIAGNOSIS — C92.10 CML (CHRONIC MYELOCYTIC LEUKEMIA) (HCC): Primary | ICD-10-CM

## 2020-07-01 DIAGNOSIS — C92.10 CML (CHRONIC MYELOCYTIC LEUKEMIA) (HCC): ICD-10-CM

## 2020-07-01 LAB
ALBUMIN SERPL-MCNC: 4.3 G/DL (ref 3.5–5.2)
ALBUMIN/GLOB SERPL: 1.9 G/DL
ALP SERPL-CCNC: 68 U/L (ref 39–117)
ALT SERPL W P-5'-P-CCNC: 24 U/L (ref 1–41)
ANION GAP SERPL CALCULATED.3IONS-SCNC: 13 MMOL/L (ref 5–15)
AST SERPL-CCNC: 30 U/L (ref 1–40)
BASOPHILS # BLD AUTO: 0.09 10*3/MM3 (ref 0–0.2)
BASOPHILS NFR BLD AUTO: 1.3 % (ref 0–1.5)
BILIRUB SERPL-MCNC: 1 MG/DL (ref 0.2–1.2)
BUN SERPL-MCNC: 8 MG/DL (ref 8–23)
BUN/CREAT SERPL: 8.4 (ref 7–25)
CALCIUM SPEC-SCNC: 9.2 MG/DL (ref 8.6–10.5)
CHLORIDE SERPL-SCNC: 102 MMOL/L (ref 98–107)
CO2 SERPL-SCNC: 27 MMOL/L (ref 22–29)
CREAT SERPL-MCNC: 0.95 MG/DL (ref 0.76–1.27)
DEPRECATED RDW RBC AUTO: 48.7 FL (ref 37–54)
EOSINOPHIL # BLD AUTO: 0.6 10*3/MM3 (ref 0–0.4)
EOSINOPHIL NFR BLD AUTO: 8.9 % (ref 0.3–6.2)
ERYTHROCYTE [DISTWIDTH] IN BLOOD BY AUTOMATED COUNT: 14.7 % (ref 12.3–15.4)
FERRITIN SERPL-MCNC: 147.5 NG/ML (ref 30–400)
FOLATE SERPL-MCNC: 16 NG/ML (ref 4.78–24.2)
GFR SERPL CREATININE-BSD FRML MDRD: 80 ML/MIN/1.73
GLOBULIN UR ELPH-MCNC: 2.3 GM/DL
GLUCOSE SERPL-MCNC: 108 MG/DL (ref 65–99)
HCT VFR BLD AUTO: 45.9 % (ref 37.5–51)
HGB BLD-MCNC: 15.1 G/DL (ref 13–17.7)
HOLD SPECIMEN: NORMAL
IMM GRANULOCYTES # BLD AUTO: 0.01 10*3/MM3 (ref 0–0.05)
IMM GRANULOCYTES NFR BLD AUTO: 0.1 % (ref 0–0.5)
IRON 24H UR-MRATE: 88 MCG/DL (ref 59–158)
IRON SATN MFR SERPL: 25 % (ref 20–50)
LYMPHOCYTES # BLD AUTO: 1.69 10*3/MM3 (ref 0.7–3.1)
LYMPHOCYTES NFR BLD AUTO: 25 % (ref 19.6–45.3)
MCH RBC QN AUTO: 29.8 PG (ref 26.6–33)
MCHC RBC AUTO-ENTMCNC: 32.9 G/DL (ref 31.5–35.7)
MCV RBC AUTO: 90.7 FL (ref 79–97)
MONOCYTES # BLD AUTO: 0.58 10*3/MM3 (ref 0.1–0.9)
MONOCYTES NFR BLD AUTO: 8.6 % (ref 5–12)
NEUTROPHILS NFR BLD AUTO: 3.78 10*3/MM3 (ref 1.7–7)
NEUTROPHILS NFR BLD AUTO: 56.1 % (ref 42.7–76)
NRBC BLD AUTO-RTO: 0 /100 WBC (ref 0–0.2)
PLATELET # BLD AUTO: 232 10*3/MM3 (ref 140–450)
PMV BLD AUTO: 9.7 FL (ref 6–12)
POTASSIUM SERPL-SCNC: 4 MMOL/L (ref 3.5–5.2)
PROT SERPL-MCNC: 6.6 G/DL (ref 6–8.5)
RBC # BLD AUTO: 5.06 10*6/MM3 (ref 4.14–5.8)
SODIUM SERPL-SCNC: 142 MMOL/L (ref 136–145)
TIBC SERPL-MCNC: 350 MCG/DL (ref 298–536)
TRANSFERRIN SERPL-MCNC: 235 MG/DL (ref 200–360)
VIT B12 BLD-MCNC: 409 PG/ML (ref 211–946)
WBC # BLD AUTO: 6.75 10*3/MM3 (ref 3.4–10.8)

## 2020-07-01 PROCEDURE — 83540 ASSAY OF IRON: CPT | Performed by: INTERNAL MEDICINE

## 2020-07-01 PROCEDURE — 84466 ASSAY OF TRANSFERRIN: CPT | Performed by: INTERNAL MEDICINE

## 2020-07-01 PROCEDURE — 81207 BCR/ABL1 GENE MINOR BP: CPT

## 2020-07-01 PROCEDURE — 36415 COLL VENOUS BLD VENIPUNCTURE: CPT | Performed by: INTERNAL MEDICINE

## 2020-07-01 PROCEDURE — 82728 ASSAY OF FERRITIN: CPT | Performed by: INTERNAL MEDICINE

## 2020-07-01 PROCEDURE — 80053 COMPREHEN METABOLIC PANEL: CPT | Performed by: INTERNAL MEDICINE

## 2020-07-01 PROCEDURE — 85025 COMPLETE CBC W/AUTO DIFF WBC: CPT | Performed by: INTERNAL MEDICINE

## 2020-07-01 PROCEDURE — 82746 ASSAY OF FOLIC ACID SERUM: CPT | Performed by: INTERNAL MEDICINE

## 2020-07-01 PROCEDURE — 82607 VITAMIN B-12: CPT | Performed by: INTERNAL MEDICINE

## 2020-07-01 PROCEDURE — 81206 BCR/ABL1 GENE MAJOR BP: CPT

## 2020-07-04 NOTE — PROGRESS NOTES
"[facility]W ONC [facility] HEMATOLOGY AND ONCOLOGY  [address]  [place] [address] [phone]    Patient Name: Cayetano Curry  Encounter Date: 07/08/2020  YOB: 1955  Patient Number: [id number]      REASON FOR VISIT:  Mr. Monteiro \"Jono\" Patricio is a 65-year-old male who returns in follow-up of his chronic phase chronic myelogenous leukemia (CML). It has been about 198 months since initiation of Gleevec. The patient is here alone. History is obtained from the patient who is considered to be a reliable historian.     DIAGNOSTIC ABNORMALITIES:   1. CBC, 12/10/2003. Total WBC of 135,000 with 28% segs, 40% bands, 8% metamyelocytes, 9% myelocytes, 1% blasts, and 2% nucleated RBC. Hemoglobin of 14, a hematocrit of 43.2, and platelet count of 146,000.  2. Leukocyte alkaline phosphatase, 12/10/2003. 19 (13 to 140).  3. Peripheral smear, 12/10/2003. Marked leukocytosis with entire spectrum of granulocytic precursors from myeloblasts (1%) to PMNs. Occasional nucleated red blood cell. Mild anisocytosis and poikilocytosis. Adequate platelets.  4. Marrow biopsy, 12/10/2003. Hypercellular, packed bone marrow. Marked granulocytic hyperplasia with all stages of maturation including 3% blasts. 1+ stainable iron. Occasional dyspoietic megakaryocytes with normal numbers. Cytometry reveals aberrant CD56 expression on myeloid cells. Cytogenetic studies pending.  5. Ferritin, B12, and LDH, 12/11/2003. 1309, greater than 2000, and 1963 (all grossly elevated).  6. Liver/spleen scan, 12/11/2003. Abnormally decreased activity within the spleen. Liver parenchyma shows normal homogenous activity without focal masses. Mild hepatomegaly. No colloid shift.  7. Chest x-ray, 12/11/2003. Normal.    PREVIOUS INTERVENTIONS:   1. Gleevec, 12/26/2003 through present. Varying doses. Held 01/30/2004 through 02/27/2004. Held again (), 12/14/2006 through " 12/18/2006.        Problem List Items Addressed This Visit        Hematopoietic and Hemostatic    CML (chronic myelocytic leukemia) (CMS/HCC) - Primary        Oncology/Hematology History    DIAGNOSTIC ABNORMALITIES:  CBC, 12/10/2003. Total WBC of 135,000 with 28% segs, 40% bands, 8% metamyelocytes, 9% myelocytes, 1% blasts, and 2% nucleated RBC. Hemoglobin of 14, a hematocrit of 43.2, and platelet count of 146,000.  Leukocyte alkaline phosphatase, 12/10/2003. 19 (13 to 140).  Peripheral smear, 12/10/2003. Marked leukocytosis with entire spectrum of granulocytic precursors from myeloblasts (1%) to PMNs. Occasional nucleated red blood cell. Mild anisocytosis and poikilocytosis. Adequate platelets.  Marrow biopsy, 12/10/2003. Hypercellular, packed bone marrow.  Marked granulocytic hyperplasia with all stages of maturation including 3% blasts. 1+ stainable iron. Occasional dyspoietic megakaryocytes with normal numbers. Cytometry reveals aberrant CD56 expression on myeloid cells. Cytogenetic studies pending.  Ferritin, B12, and LDH, 12/11/2003. 1309, greater than 2000, and 1963 (all grossly elevated).  Liver/spleen scan, 12/11/2003. Abnormally decreased activity within the spleen. Liver parenchyma shows normal homogenous activity without focal masses. Mild hepatomegaly. No colloid shift.  Chest x-ray, 12/11/2003. Normal.  PREVIOUS INTERVENTIONS:  Gleevec, 12/26/2003 through present. Varying doses. Held 01/30/2004 through 02/27/2004.  Held again (), 12/14/2006 through 12/18/2006.        CML (chronic myelocytic leukemia) (CMS/HCC)    12/10/2003 Initial Diagnosis     CML (chronic myelocytic leukemia) (CMS/[facility])         PAST MEDICAL HISTORY:  ALLERGIES:  No Known Allergies  CURRENT MEDICATIONS:  Outpatient Encounter Medications as of 7/8/2020   Medication Sig Dispense Refill   • citalopram (CeleXA) 40 MG tablet      • fluticasone (FLONASE) 50 MCG/ACT nasal spray      • gabapentin (NEURONTIN) 300 MG capsule      •  GLEEVEC 100 MG chemo tablet Take 3 tablets by mouth Daily. At the same time with food and a large glass of water.  Avoid grapefruit products. 270 tablet 1   • meloxicam (MOBIC) 15 MG tablet      • omeprazole (priLOSEC) 40 MG capsule      • oxyCODONE-acetaminophen (PERCOCET)  MG per tablet      • rOPINIRole (REQUIP) 0.5 MG tablet      • tamsulosin (FLOMAX) 0.4 MG capsule 24 hr capsule TK ONE C PO D 30 MINUTES AFTER THE SAME MEAL EACH DAY     • FEROSUL 325 (65 Fe) MG tablet TAKE 1 TABLET BY MOUTH TWICE DAILY 60 tablet 2     No facility-administered encounter medications on file as of 7/8/2020.      ADULT ILLNESSES:   CML ( chronic; ICD-10:C92.10 ;Chronic myeloid leukemia, BCR/ABL-positive, not having achieved remission )   Chondromalacia, NOS ( Date of Dx:04/07/2004 Bilateral patellofemoral chondromalacia; ICD-10:M94.20 ;Chondromalacia, unspecified site )   Degenerative joint disease ( ICD-10:M19.90 ;Unspecified osteoarthritis, unspecified site   Drinks alcohol ( consumed a case of beer a week for the past 20-30 years; ICD-10:F10.10 ;Alcohol abuse, uncomplicated )   Ganglion cyst ( excision from right wrist; ICD-10:M67.431 ;Ganglion, right wrist )   Gastroesophageal reflux disease ( ICD-10:K21.9 ;Gastro-esophageal reflux disease without esophagitis   Hepatitis C ( Date of Dx:2000 ; ICD-10:B19.20 ;Unspecified viral hepatitis C without hepatic coma )   Leukopenia ( without neutropenia; ICD-10:D72.819 ;Decreased white blood cell count, unspecified )   Lichen planus ( Recurrent mucocutaneous lichen planus; ICD-10:L43.9 ;Lichen planus, unspecified )   Nephrolithiasis ( on CT scans, 02/2015; ICD-10:N20.0 ;Calculus of kidney )   Normocytic normochromic anemia (disorder) ( ICD-10:D64.9 ;Anemia, unspecified   Restless legs syndrome ( ICD-10:G25.81 ;Restless legs syndrome   Seasonal allergic rhinitis (disorder) ( ICD-10:J30.2 ;Other seasonal allergic rhinitis   Spinal fusion ( from S1-L5; )    SURGERIES:   Spinal fusion,  L5-S1, 1982. No details   Core biopsy of the liver, 11/03/2004. Mild portal inflammation. No hepatic steatosis, portal fibrosis, cirrhosis, or metastatic malignancy. Mild increase in stainable iron (3+). Changes consistent with hepatitis C, Scheuer grade 2, Stage 0   Left knee arthroscopy, 2004, Dr. Harmon   Anterior cervical fusion, allograft and [place] plating, 06/02/2010. Dr. Caceres. C5-C6 and C6-C7 abnormalities   Right knee arthroscopy, 09/19/2008, Dr. Stratton   Neck surgery to replace neck plate after a fall on his face, 09/10/2012   Carpal tunnel release, right wrist, 08/2006. Dr. Stratton   Left knee arthroscopy, 07/02/2008, Dr. Stratton   Ganglion cyst removal, left wrist, 01/20/2011. Dr. Thornton   Left knee replacement, 04/18/2018. Dr. Rizo.   Colonoscopy, 03/31/2016. Normal. Repeat 10 years.   Left shoulder/humerus fracture, with ORIF last 04/03/2019. Dr. Mensah.  06/19/2020- EGD normal with biopsies of the small intestine, duodenum: Benign duodenal mucosa with submucosa.  Histologic changes of celiac sprue not identified.  06/19/2020-colonoscopy.  Internal hemorrhoids otherwise normal.      ADULT ILLNESSES:  Patient Active Problem List   Diagnosis Code   • Anemia D64.9   • CML (chronic myelocytic leukemia) (CMS/HCC) C92.10   • Hep C w/o coma, chronic (CMS/HCC) B18.2   • GERD (gastroesophageal reflux disease) K21.9   • Adhesive capsulitis of knee, left M76.892   • Bilateral low back pain with right-sided sciatica M54.41   • Chronic midline low back pain with bilateral sciatica M54.41, M54.42, G89.29   • High risk medications (not anticoagulants) long-term use Z79.899   • Hx of lumbosacral spine surgery Z98.890   • Hyperlipidemia E78.5   • Lumbar facet arthropathy M47.816   • Myofascial pain syndrome M79.18   • Neck pain M54.2     SURGERIES:  Past Surgical History:   Procedure Laterality Date   • ANTERIOR CERVICAL FUSION     • CARPAL TUNNEL RELEASE Right    • COLONOSCOPY  03/31/2016    The entire examined colon  is normal on direct and retroflexion views; Repeat 10 years    • COLONOSCOPY N/A 6/13/2019    Non-bleeding internal hemorrhoids; The examination was otherwise normal; No specimens collected; Repeat 10 years   • COLONOSCOPY  03/03/2008    Dr. Geiger-Normal; Repeat 5 years   • ENDOSCOPY N/A 6/19/2019    Normal esophagus; Normal stomach; Normal examined duodenum-biopsied   • GANGLION CYST EXCISION     • KNEE ARTHROSCOPY Left    • KNEE ARTHROSCOPY Right    • LIVER BIOPSY  11/03/2004    Mild portal inflammation; No hepatic steatosis, portal fibrosis, cirrhosis, or metastatic malignancy; mild increase in stainable iron (3+); Changes consistent with Hep C, Scheuer grade 2, stage 0   • NECK SURGERY      To replace neck plate after a fall    • ORIF HUMERUS FRACTURE Left    • OTHER SURGICAL HISTORY      arm surgery with hardware   • REPLACEMENT TOTAL KNEE Left    • SPINAL FUSION       HEALTH MAINTENANCE ITEMS:  Health Maintenance Due   Topic Date Due   • TDAP/TD VACCINES (1 - Tdap) 04/04/1968   • PNEUMOCOCCAL VACCINE (19-64 MEDIUM RISK) (1 of 1 - PPSV23) 04/04/1976   • ZOSTER VACCINE (1 of 2) 04/04/2007   • MEDICARE ANNUAL WELLNESS  06/09/2017   • LIPID PANEL  07/08/2020       <no information>  Last Completed Colonoscopy       Status Date      COLONOSCOPY Done 6/13/2019 COLONOSCOPY     Patient has more history with this topic...          There is no immunization history on file for this patient.  Last Completed Mammogram     Patient has no health maintenance due at this time            FAMILY HISTORY:  Family History   Problem Relation Age of Onset   • Prostate cancer Father    • No Known Problems Mother    • No Known Problems Maternal Grandmother    • No Known Problems Maternal Grandfather    • No Known Problems Paternal Grandmother    • No Known Problems Paternal Grandfather    • Colon cancer Neg Hx    • Colon polyps Neg Hx      SOCIAL HISTORY:  Social History     Socioeconomic History   • Marital status:      Spouse  "name: Not on file   • Number of children: Not on file   • Years of education: Not on file   • Highest education level: Not on file   Tobacco Use   • Smoking status: Former Smoker     Last attempt to quit: 1975     Years since quittin.0   • Smokeless tobacco: Never Used   Substance and Sexual Activity   • Alcohol use: Yes     Comment: occ   • Drug use: No   • Sexual activity: Defer       REVIEW OF SYSTEMS:  Gleevec tolerance:   No subjective problems with bouts of loose stools. Denies headaches. \"Nope.\"  Constitutional:   The patient's appetite is good, \"not great.\" His energy is fair, \"still do pretty good.\" Has been active, \"my left shoulder feels better.\" He has no fevers or chills. He has not had any bouts of non-drenching night sweats. He has lost 4 pounds (in addition to 6 pounds at his prior visit- had gained 11 pounds at his 2 visits prior to that) in the interval since his last visit. His sleep habits have been fairly good.   Ear/Nose/Throat/Mouth:   He has perennial sinus symptoms. He has had mouth sores previously modulated by Miracle Mouthwash. He has no ear pains, sore throat, or nosebleeds. He has no headaches.  Ocular:   He denies eye pain, significant change in visual acuity, double vision, or blurry vision.  Respiratory:   He has no significant shortness of breathing, cough, phlegm production, or unexplained chest wall pain.  Cardiovascular:   He has no exertional chest pain. He has no claudication. He has no palpitations or symptomatic orthostasis. Says he had a stress test last 2010 per Dr. Broadbent, reportedly negative.  Gastrointestinal:   He has not had any bouts of postprandial pyrosis on omeprazole. He has no dysphagia, nausea, vomiting, no bloating, abdominal pain or cramping. He has no jaundice. He has infrequent loose stools. Has no dark (off oral iron) discoloration of the stool. Was tolerating oral iron bid.  He has had no rectal bleeding. He has not been constipated in " "spite of Percocet 0-4/day (from 5 doses/day). Had repeat colonoscopy (above), 03/31/2016. Normal.   Genitourinary:   He has no urinary burning, frequency, dribbling, or discoloration. He has no difficulty controlling his bladder. He has no need to urinate frequently through the night.  Musculoskeletal:   He has chronic left knee, neck, and back pains with right upper arm paresthesias have not been worse since his most recent left knee surgery. He still uses Percocet (OxyContin stopped by Dr. Leblanc) but less often. His left knee still bothers him. Dr. Rizo follows. Was seeing Dr. Rubio for pain management. He has no more left shoulder pains after ORIF last 04/05/2019. Has no other unexplained arthralgias or myalgias.   Endocrine:   He has no problems with excess thirst, excessive urination, vasomotor instability, or unexplained fatigue.  Heme/Lymphatic:   He has no unexplained bleeding, bruising, petechial rashes, or swollen glands.  Skin:   He says the lesion of the left ear has healed.  He has not had any recurrent furuncles. He has no itching.  Neuro:   The right hand numbness and weakness seems to have resolved (had redo cervical fusion, 09/2012). He has no loss of consciousness, seizures, fainting spells, or dizziness. He has no weakness of his face, arms, or legs. He has no difficulty with speech. He has no tremors. He has intermittent restless legs. Has been using Neurontin to good effect.  Psych:   He seems generally satisfied with life. He denies depression or anxiety.      VITAL SIGNS: /66   Pulse 75   Temp 97.1 °F (36.2 °C)   Resp 16   Ht 165.1 cm (65\")   Wt 66.6 kg (146 lb 14.4 oz)   SpO2 96%   BMI 24.45 kg/m² Body surface area is 1.73 meters squared.  Pain Score    07/08/20 1535   PainSc: 0-No pain         PHYSICAL EXAMINATION:   General:   He is an extremely-pleasant, more slender, leaner, well-appearing male in no distress. He is ambulatory. ECOG PS = 0  Head/Neck:   The patient is " "anicteric. Wearing a surgical mask but shows me that he has multiple missing teeth and shallow inner lip excoriations \"from these sharp tooth shards.\" The trachea is midline. The neck is supple without evidence of jugular venous distention or cervical adenopathy.   Eyes:   The extraocular movements are full. There is no scleral jaundice or erythema.  Chest:  The respiratory efforts are normal and unhindered. The chest is clear to auscultation. There are no wheezes, rhonchi, rales, or asymmetry of breath sounds.  Cardiovascular:   The patient has a regular cardiac rate and rhythm without murmurs, rubs, or gallops.  Abdomen:   The belly is soft and slightly globose. There is no rebound or guarding. There is no organomegaly, mass-effect, or tenderness.  Extremities:   There is no evidence of cyanosis, clubbing, or edema. The left anterior shoulder incision is healed. 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  Rheumatologic:   There is no overt evidence of rheumatoid deformities of the hands. There is no sausaging of the fingers. There is no sign of active synovitis. The gait is no longer antalgic (prior left knee replacement).  Cutaneous:   There are no disseminated lesions, purpura, or petechiae.   Lymphatics:   There is no evidence of adenopathy in the cervical, supraclavicular, axillary, inguinal, or femoral areas. There is no splenomegaly.  Neurologic:   The patient is alert, oriented, cooperative, and pleasant. He is appropriately conversant. He is fully ambulatory and able to get up onto the exam table without assistance. There is no overt dysfunction of the motor, sensory, or cerebellar systems.  Psych:   Mood and affect are appropriate for circumstance. Eye contact is appropriate.        LABS    Lab Results - Last 18 Months   Lab Units 07/01/20  1312 03/16/20  1119 09/06/19  1221 05/24/19  1550 05/17/19  1255   HEMOGLOBIN g/dL 15.1 14.8 14.9 15.0 13.9*   HEMATOCRIT % 45.9 44.0 44.0 45.1 " 41.8   MCV fL 90.7 91.3 90.3 92.2 90.1   WBC 10*3/mm3 6.75 7.45 10.37 6.99 5.95   RDW % 14.7 14.0 13.9 13.8 13.9   MPV fL 9.7 9.7 9.5 10.2 9.8   PLATELETS 10*3/mm3 232 235 279 263 220   IMM GRAN % % 0.1 0.1 0.4 0.3 0.0   NEUTROS ABS 10*3/mm3 3.78 4.43 7.89* 3.42 2.68   LYMPHS ABS 10*3/mm3 1.69 2.03 1.60 2.33 1.92   MONOS ABS 10*3/mm3 0.58 0.58 0.71 0.69 0.74   EOS ABS 10*3/mm3 0.60* 0.35 0.08 0.45 0.52   BASOS ABS 10*3/mm3 0.09 0.05 0.05 0.08 0.09   IMMATURE GRANS (ABS) 10*3/mm3 0.01 0.01 0.04 0.02 0.00   NRBC /100 WBC 0.0 0.0 0.0 0.0 0.0       Lab Results - Last 18 Months   Lab Units 07/01/20  1312 03/16/20  1119 09/06/19  1221 05/17/19  1255 01/22/19  1600   GLUCOSE mg/dL 108* 115* 110* 104* 128*   SODIUM mmol/L 142 139 140 140 141   POTASSIUM mmol/L 4.0 3.9 4.0 4.3 4.7   CO2 mmol/L 27.0 28.0 32.0* 27.0 32.0*   CHLORIDE mmol/L 102 100 101 104 99   ANION GAP mmol/L 13.0 11.0 7.0 9.0 10.0   CREATININE mg/dL 0.95 1.13 1.05 1.05 1.18   BUN mg/dL 8 11 14 16 14   BUN / CREAT RATIO  8.4 9.7 13.3 15.2 11.9   CALCIUM mg/dL 9.2 8.9 9.8 9.0 9.4   EGFR IF NONAFRICN AM mL/min/1.73 80 66 72 72 63   ALK PHOS U/L 68 69 75 92 69   TOTAL PROTEIN g/dL 6.6 6.8 7.9 7.4 6.7   ALT (SGPT) U/L 24 22 28 228* 35   AST (SGOT) U/L 30 27 41 199* 41   BILIRUBIN mg/dL 1.0 0.6 1.0 1.0 0.5   ALBUMIN g/dL 4.30 4.20 4.50 4.70 4.00   GLOBULIN gm/dL 2.3 2.6 3.4 2.7 2.7       No results for input(s): MSPIKE, KAPPALAMB, IGLFLC, URICACID, FREEKAPPAL, CEA, LDH, REFLABREPO in the last 81966 hours.    Lab Results - Last 18 Months   Lab Units 07/01/20  1312 03/16/20  1119 09/06/19  1221 05/17/19  1255 01/22/19  1600   IRON mcg/dL 88 115 168 90 112   TIBC mcg/dL 350 374 355 340 328   IRON SATURATION % 25 31 47* 26 34   FERRITIN ng/mL 147.50 127.60 96.40 144.00 69.80   FOLATE ng/mL 16.00 12.10  --   --   --        ASSESSMENT:   1. Chronic myelogenous leukemia (CML):   Stage: Chronic phase.   Tumor burden: Marrow involvement. No hepatosplenomegaly.    Complications of tumor: None. No manifestations of hyperviscosity.   Tolerance to therapy: Cytopenias and liver function test (LFT) abnormalities.   Status: Cytogenetic remission, with hematologic remission. No detectable disease (bone marrow biopsy, 03/30/2007 - no BCR-ABL fusion transcript was detected by real-time PCR) on current dose of Gleevec.   No BCR-ABL transcript detected (IS: less than 0.1% - Major Molecular Response), 09/06/2018.   NEGATIVE for the BCR-ABL1 e1a2 (p190), e13a2 (b2a2, p210) and e14a2 (b3a2, p210) fusion transcripts, 01/22/2019. The standardized baseline is 100% BCR-ABL1 (IS) and major molecular response (MMR) is equivalent to 0.1% BCRABL1 (IS)   NEGATIVE for the BCR-ABL1 e1a2 (p190), e13a2 (b2a2, p210) and e14a2 (b3a2, p210) fusion transcripts, 05/17/2019. The standardized baseline is 100% BCR-ABL1 (IS) and major molecular response (MMR) is equivalent to 0.1% BCRABL1 (IS)  2. Hepatitis C with chronic elevation of liver function tests (LFTs).   Previous liver biopsy negative for cirrhosis or malignancy.   Colonoscopy 03/31/2016. The entire examined colon appeared normal on direct and retroflexion views.   Completed hepatitis C Rx (Harvoni) beginning 05/13/2016 (daily x8 weeks). Followed for gastroenterology (GI) by Dr. Rosas.   Recurrent transaminitis, 05/17/2019 06/06/2019- liver ultrasound.  Impression: Mildly coarsened liver echogenicity which can be seen with chronic liver disease.  06/19/2019-EGD normal.  Colonoscopy with internal hemorrhoids otherwise normal.  3. Normocytic anemia, Gleevec associated. Stable, Hgb 15.1 on 07/01/2020 (prior range: Hgb 12.6 - 15).   4. Leukopenia without neutropenia. Gleevec associated. Normal counts since 12/14/2016.   5. New onset renal insufficiency. Improved, GFR 80 mL/min, 07/01/2020 (prior range: 59.6 -72 mL/min). Followed by nephrology.   6. Gastroesophageal reflux disease (GERD), well-modulated on proton pump inhibitor (PPI).   7. Degenerative  "joint disease (DJD), knees and back especially. Pain management (Ortho - Dr. Rubio) follows.   8. Alcohol (ETOH) use. Says he has not imbibed at all since 07/2009, \"not since.\"   9. C5-C6 and C6-C7 abnormalities with disk rupture with brachial neuritis/radiculitis. Had prior anterior cervical fusion, allograft, and [place] plating, 06/02/2010. Dr. Caceres. Underwent redo surgery, 09/2012 after a fall and hyperextension injury.   10. Restless legs, well-modulated on Neurontin as needed.   11. Seen by Ortho Pain Management (Dr. Rubio), 08/22/2018. Impression: Myofascial pain syndrome/radiculopathy of lumbar region. Given Rx for Percocet. Assumed opioid prescribing.   12. Poor dentition and inner lip (traumatic) lacerations  13. A bit self directed.     PLAN:   1.  Re: Apprise of labs from 07/01/2020 with resolution of anemia, normal WBC and otherwise normal CBC, and other labs including the stable GFR, normal AST/ALT, otherwise normal CMP, repleted ferritin, repleted iron, repleted Fe sat (> 20%), repleted B12/folate.   2.  Re: Note the BCR/ABL IS quantitative study, 07/01/2020- negative for detection of the BCR/ABL fusion gene (0.1% - Major Molecular Response).   3. Continue Gleevec 300 mg daily ([facility] mails to him).   4.  Apprised of liver ultrasound, 06/06/2019 showing coarse liver and chronic liver disease, and EGD/colonoscopy, 06/19/2019 (above) with normal EGD and colonoscopy showing internal hemorrhoids otherwise normal.  5. STOP Ferrous sulfate (ferritin > 100)         6. Rx:  Magic mouthwash - 5 mL SS 4-5x/day x 10 days  6. Continue other currently identified medications. He gets his Gleevec mailed in - [facility].   7. Continue ongoing management per primary care physician and other specialists. Is going to make an appointment to be seen by his dentist.  \"I need all these bad teeth pulled.\"  8. Return to the [place] office with pre-office RT-PCR for BCR-ABL, serum iron, Fe saturation, " ferritin, CMP, phosphorus, and CBC with differential in 24 weeks.    MEDICAL DECISION MAKING: High Complexity   AMOUNT OF DATA: Moderate to Extensive     I spent 40 total minutes, face-to-face, caring for Caeytano today.  Greater than 50% of this time involved counseling and/or coordination of care as documented within this note regarding the patient's illness(es), pros and cons of various treatment options, instructions and/or risk reduction.    cc:  NEHAL Wallace

## 2020-07-08 ENCOUNTER — OFFICE VISIT (OUTPATIENT)
Dept: ONCOLOGY | Facility: CLINIC | Age: 65
End: 2020-07-08

## 2020-07-08 VITALS
WEIGHT: 146.9 LBS | SYSTOLIC BLOOD PRESSURE: 122 MMHG | HEART RATE: 75 BPM | DIASTOLIC BLOOD PRESSURE: 66 MMHG | BODY MASS INDEX: 24.47 KG/M2 | TEMPERATURE: 97.1 F | HEIGHT: 65 IN | RESPIRATION RATE: 16 BRPM | OXYGEN SATURATION: 96 %

## 2020-07-08 DIAGNOSIS — C92.10 CML (CHRONIC MYELOCYTIC LEUKEMIA) (HCC): Primary | ICD-10-CM

## 2020-07-08 PROBLEM — Z79.899 HIGH RISK MEDICATIONS (NOT ANTICOAGULANTS) LONG-TERM USE: Status: ACTIVE | Noted: 2020-07-08

## 2020-07-08 PROBLEM — Z98.890 HX OF LUMBOSACRAL SPINE SURGERY: Status: ACTIVE | Noted: 2020-07-08

## 2020-07-08 PROBLEM — M54.42 CHRONIC MIDLINE LOW BACK PAIN WITH BILATERAL SCIATICA: Status: ACTIVE | Noted: 2018-07-12

## 2020-07-08 PROBLEM — E78.5 HYPERLIPIDEMIA: Status: ACTIVE | Noted: 2018-04-21

## 2020-07-08 PROBLEM — M76.892: Status: ACTIVE | Noted: 2018-04-18

## 2020-07-08 PROBLEM — M54.41 CHRONIC MIDLINE LOW BACK PAIN WITH BILATERAL SCIATICA: Status: ACTIVE | Noted: 2018-07-12

## 2020-07-08 PROBLEM — M54.2 NECK PAIN: Status: ACTIVE | Noted: 2018-07-12

## 2020-07-08 PROBLEM — M79.18 MYOFASCIAL PAIN SYNDROME: Status: ACTIVE | Noted: 2017-03-06

## 2020-07-08 PROBLEM — G89.29 CHRONIC MIDLINE LOW BACK PAIN WITH BILATERAL SCIATICA: Status: ACTIVE | Noted: 2018-07-12

## 2020-07-08 PROCEDURE — 99215 OFFICE O/P EST HI 40 MIN: CPT | Performed by: INTERNAL MEDICINE

## 2020-07-08 RX ORDER — TAMSULOSIN HYDROCHLORIDE 0.4 MG/1
CAPSULE ORAL
COMMUNITY
Start: 2020-06-13 | End: 2022-02-07 | Stop reason: ALTCHOICE

## 2020-07-08 RX ORDER — MELOXICAM 15 MG/1
TABLET ORAL
COMMUNITY
Start: 2020-04-27 | End: 2022-02-07 | Stop reason: ALTCHOICE

## 2020-07-16 ENCOUNTER — TELEPHONE (OUTPATIENT)
Dept: ONCOLOGY | Facility: CLINIC | Age: 65
End: 2020-07-16

## 2020-11-03 PROBLEM — M47.816 LUMBAR FACET ARTHROPATHY: Status: RESOLVED | Noted: 2017-05-02 | Resolved: 2020-11-03

## 2020-11-13 ENCOUNTER — SPECIALTY PHARMACY (OUTPATIENT)
Dept: ONCOLOGY | Facility: HOSPITAL | Age: 65
End: 2020-11-13

## 2020-11-13 DIAGNOSIS — C92.10 CML (CHRONIC MYELOCYTIC LEUKEMIA) (HCC): Primary | ICD-10-CM

## 2020-11-18 RX ORDER — IMATINIB MESYLATE 100 MG/1
300 TABLET, FILM COATED ORAL DAILY
Qty: 30 TABLET | Refills: 11 | Status: SHIPPED | OUTPATIENT
Start: 2020-11-18 | End: 2021-11-23

## 2020-12-21 ENCOUNTER — SPECIALTY PHARMACY (OUTPATIENT)
Dept: PHARMACY | Facility: HOSPITAL | Age: 65
End: 2020-12-21

## 2020-12-21 NOTE — PROGRESS NOTES
[facility]    Oral Oncology Patient Follow-Up      Consult Details  Consulting Provider:   Klever Ferreira MD ([facility])   Medication and Regimen:  Imatinib [Gleevec] 300 mg PO daily     Prescription Review  Dosing & Interactions:   Reviewed, appropriate and no significant interaction noted at this time..   Prior Authorization Expiration: Approved   Current Rx End Date: 11/12/2021     Intervention(s):                  Patient established on imatinib and prefers to utiliize [facility] for refills.  Left VM to contact office or pharmacy with any concerning symptoms or questions.     Summary:    Will continue to follow.     Nohemi Subramanian, PharmD  12/21/2020 16:44 CST

## 2021-01-12 ENCOUNTER — TELEPHONE (OUTPATIENT)
Dept: ONCOLOGY | Facility: CLINIC | Age: 66
End: 2021-01-12

## 2021-01-12 DIAGNOSIS — C92.10 CML (CHRONIC MYELOCYTIC LEUKEMIA) (HCC): Primary | ICD-10-CM

## 2021-01-12 NOTE — TELEPHONE ENCOUNTER
Spoke with Susana about Cayetano needing pre office lab work done.  Missed appointment on 1/11/21.  Requested appointment for 1/13/21 at 2 pm.

## 2021-01-13 ENCOUNTER — LAB (OUTPATIENT)
Dept: LAB | Facility: HOSPITAL | Age: 66
End: 2021-01-13

## 2021-01-13 DIAGNOSIS — C92.10 CML (CHRONIC MYELOCYTIC LEUKEMIA) (HCC): ICD-10-CM

## 2021-01-13 LAB
ALBUMIN SERPL-MCNC: 4.2 G/DL (ref 3.5–5.2)
ALBUMIN/GLOB SERPL: 1.5 G/DL
ALP SERPL-CCNC: 67 U/L (ref 39–117)
ALT SERPL W P-5'-P-CCNC: 21 U/L (ref 1–41)
ANION GAP SERPL CALCULATED.3IONS-SCNC: 8 MMOL/L (ref 5–15)
AST SERPL-CCNC: 28 U/L (ref 1–40)
BASOPHILS # BLD AUTO: 0.05 10*3/MM3 (ref 0–0.2)
BASOPHILS NFR BLD AUTO: 0.8 % (ref 0–1.5)
BILIRUB SERPL-MCNC: 0.6 MG/DL (ref 0–1.2)
BUN SERPL-MCNC: 13 MG/DL (ref 8–23)
BUN/CREAT SERPL: 12 (ref 7–25)
CALCIUM SPEC-SCNC: 9.1 MG/DL (ref 8.6–10.5)
CHLORIDE SERPL-SCNC: 103 MMOL/L (ref 98–107)
CO2 SERPL-SCNC: 30 MMOL/L (ref 22–29)
CREAT SERPL-MCNC: 1.08 MG/DL (ref 0.76–1.27)
DEPRECATED RDW RBC AUTO: 45.8 FL (ref 37–54)
EOSINOPHIL # BLD AUTO: 0.51 10*3/MM3 (ref 0–0.4)
EOSINOPHIL NFR BLD AUTO: 8.1 % (ref 0.3–6.2)
ERYTHROCYTE [DISTWIDTH] IN BLOOD BY AUTOMATED COUNT: 13.5 % (ref 12.3–15.4)
FERRITIN SERPL-MCNC: 124.8 NG/ML (ref 30–400)
GFR SERPL CREATININE-BSD FRML MDRD: 69 ML/MIN/1.73
GLOBULIN UR ELPH-MCNC: 2.8 GM/DL
GLUCOSE SERPL-MCNC: 97 MG/DL (ref 65–99)
HCT VFR BLD AUTO: 44.4 % (ref 37.5–51)
HGB BLD-MCNC: 14.3 G/DL (ref 13–17.7)
IMM GRANULOCYTES # BLD AUTO: 0.02 10*3/MM3 (ref 0–0.05)
IMM GRANULOCYTES NFR BLD AUTO: 0.3 % (ref 0–0.5)
IRON 24H UR-MRATE: 110 MCG/DL (ref 59–158)
IRON SATN MFR SERPL: 30 % (ref 20–50)
LYMPHOCYTES # BLD AUTO: 1.65 10*3/MM3 (ref 0.7–3.1)
LYMPHOCYTES NFR BLD AUTO: 26.2 % (ref 19.6–45.3)
MCH RBC QN AUTO: 30 PG (ref 26.6–33)
MCHC RBC AUTO-ENTMCNC: 32.2 G/DL (ref 31.5–35.7)
MCV RBC AUTO: 93.1 FL (ref 79–97)
MONOCYTES # BLD AUTO: 0.6 10*3/MM3 (ref 0.1–0.9)
MONOCYTES NFR BLD AUTO: 9.5 % (ref 5–12)
NEUTROPHILS NFR BLD AUTO: 3.46 10*3/MM3 (ref 1.7–7)
NEUTROPHILS NFR BLD AUTO: 55.1 % (ref 42.7–76)
NRBC BLD AUTO-RTO: 0 /100 WBC (ref 0–0.2)
PHOSPHATE SERPL-MCNC: 2.7 MG/DL (ref 2.5–4.5)
PLATELET # BLD AUTO: 190 10*3/MM3 (ref 140–450)
PMV BLD AUTO: 9.8 FL (ref 6–12)
POTASSIUM SERPL-SCNC: 4.1 MMOL/L (ref 3.5–5.2)
PROT SERPL-MCNC: 7 G/DL (ref 6–8.5)
RBC # BLD AUTO: 4.77 10*6/MM3 (ref 4.14–5.8)
SODIUM SERPL-SCNC: 141 MMOL/L (ref 136–145)
TIBC SERPL-MCNC: 364 MCG/DL (ref 298–536)
TRANSFERRIN SERPL-MCNC: 244 MG/DL (ref 200–360)
WBC # BLD AUTO: 6.29 10*3/MM3 (ref 3.4–10.8)

## 2021-01-13 PROCEDURE — 83540 ASSAY OF IRON: CPT

## 2021-01-13 PROCEDURE — 80053 COMPREHEN METABOLIC PANEL: CPT

## 2021-01-13 PROCEDURE — 84100 ASSAY OF PHOSPHORUS: CPT

## 2021-01-13 PROCEDURE — 85025 COMPLETE CBC W/AUTO DIFF WBC: CPT

## 2021-01-13 PROCEDURE — 84466 ASSAY OF TRANSFERRIN: CPT

## 2021-01-13 PROCEDURE — 82728 ASSAY OF FERRITIN: CPT

## 2021-01-13 PROCEDURE — 36415 COLL VENOUS BLD VENIPUNCTURE: CPT

## 2021-01-19 ENCOUNTER — TELEPHONE (OUTPATIENT)
Dept: ONCOLOGY | Facility: CLINIC | Age: 66
End: 2021-01-19

## 2021-01-19 NOTE — TELEPHONE ENCOUNTER
Caller: eduin    Relationship to patient: wife    Best call back number: [phone]    Wife would like to resched the pt's missed follow up appt on 1/18/21. Pt prefers asap. Unable to resched within hub timeframe.

## 2021-01-21 LAB — REF LAB TEST METHOD: NORMAL

## 2021-02-05 ENCOUNTER — SPECIALTY PHARMACY (OUTPATIENT)
Dept: ONCOLOGY | Facility: HOSPITAL | Age: 66
End: 2021-02-05

## 2021-02-05 NOTE — PROGRESS NOTES
[facility]    Oral Oncology Patient Follow-Up      Consult Details  Consulting Provider:   Klever Ferreira MD ([facility])   Medication and Regimen:  Imatinib [Gleevec] 300 mg po daily     Prescription Review  Dosing & Interactions:   Reviewed, appropriate and no significant interaction noted at this time..   Current Specialty Pharmacy [facility]     Intervention(s):                  Patient reports doing very well on imatinib, no adverse effects or other concerns.  His refills have been coming appropriately through [facility] and has refills through November 2021.  No other questions at this time.     Summary:    Will continue to follow.     Nohemi Subramanian, PharmD  02/05/2021 16:06 CST

## 2021-02-22 ENCOUNTER — TELEPHONE (OUTPATIENT)
Dept: ONCOLOGY | Facility: CLINIC | Age: 66
End: 2021-02-22

## 2021-02-22 NOTE — TELEPHONE ENCOUNTER
Received call from patient Cayetano Curry, he calls to report that he has not received his monthly shipment of Gleevec, he has now been without it x 10 days, he feels possibly due to the weather related issues requesting help.  I called and spoke with [facility] regarding patient issue, I was then re-directed to shipping. The  then gave me a direct number to that department so shipment could be lucrecia for delivery    This number was given to patient Cayetano Curry so he can lucrecia the delivery accordingly.   [phone]  Pt will call and lucrecia delivery

## 2021-02-25 ENCOUNTER — TELEPHONE (OUTPATIENT)
Dept: ONCOLOGY | Facility: CLINIC | Age: 66
End: 2021-02-25

## 2021-02-25 NOTE — TELEPHONE ENCOUNTER
Received call back from patient Cayetano Curry, he calls to report that he still has not received his Gleevec prescription and this is now going on 16 days without.    Call back to [facility], spoke with Gabbie richter first she traced down when the medication, noted it had left their facility 2/20/21, traced location to [place] on 2/23/21 and after that point she was unable to trace, she spoke with [facility] but they were unable to locate the medication (it is now considered lost) I was then transferred to the Medication Escalation Team so his medication Gleevec can be expedited out since he is now 16 days without. They will contact patient and explain what has happened and make arrangements to over nite medication to his home directly.   Spoke with Amanda.

## 2021-02-26 ENCOUNTER — TELEPHONE (OUTPATIENT)
Dept: ONCOLOGY | Facility: CLINIC | Age: 66
End: 2021-02-26

## 2021-02-26 NOTE — TELEPHONE ENCOUNTER
Follow up call to patient to check and see if he had heard from [facility] regarding the expedited shipment of his medication Gleevec.  Patient states that he did not receive a call from yesterday after our initial phone call, informed patient I would follow up and call my self to see what is going on with his medication.     Call back to [facility], spoke with Angie Pharmacy Representative, she was able to find documentation that the medication has been shipped out last nite 2/25/21, but patient was not called to give update, patient is lucrecia for delivery today Friday 2/26/21 @ noon via [facility]  TRACKING NUMBER: [id number]    Call back to patient and he was informed this information, he was encouraged to please call me back if he does not receive the shipment today, he v/u of information.

## 2021-03-21 NOTE — PROGRESS NOTES
"[facility] ONC [facility] HEMATOLOGY AND ONCOLOGY  [address]  [facility] [address] [phone]    Patient Name: Cayetano Curry  Encounter Date: 03/29/2021  YOB: 1955  Patient Number: [id number]      REASON FOR VISIT:  Mr. Monteiro \"Jono\" Patricio is a 65-year-old male who returns in follow-up of his chronic phase chronic myelogenous leukemia (CML). It has been about 207 months since initiation of Gleevec. The patient is here alone. History is obtained from the patient who is considered to be a reliable historian.     DIAGNOSTIC ABNORMALITIES:   1. CBC, 12/10/2003. Total WBC of 135,000 with 28% segs, 40% bands, 8% metamyelocytes, 9% myelocytes, 1% blasts, and 2% nucleated RBC. Hemoglobin of 14, a hematocrit of 43.2, and platelet count of 146,000.  2. Leukocyte alkaline phosphatase, 12/10/2003. 19 (13 to 140).  3. Peripheral smear, 12/10/2003. Marked leukocytosis with entire spectrum of granulocytic precursors from myeloblasts (1%) to PMNs. Occasional nucleated red blood cell. Mild anisocytosis and poikilocytosis. Adequate platelets.  4. Marrow biopsy, 12/10/2003. Hypercellular, packed bone marrow. Marked granulocytic hyperplasia with all stages of maturation including 3% blasts. 1+ stainable iron. Occasional dyspoietic megakaryocytes with normal numbers. Cytometry reveals aberrant CD56 expression on myeloid cells. Cytogenetic studies pending.  5. Ferritin, B12, and LDH, 12/11/2003. 1309, greater than 2000, and 1963 (all grossly elevated).  6. Liver/spleen scan, 12/11/2003. Abnormally decreased activity within the spleen. Liver parenchyma shows normal homogenous activity without focal masses. Mild hepatomegaly. No colloid shift.  7. Chest x-ray, 12/11/2003. Normal.    PREVIOUS INTERVENTIONS:   1. Gleevec, 12/26/2003 through present. Varying doses. Held 01/30/2004 through 02/27/2004. Held again (), 12/14/2006 through " 12/18/2006.        Problem List Items Addressed This Visit        Other    CML (chronic myelocytic leukemia) (CMS/Formerly [facility]) - Primary        Oncology/Hematology History Overview Note   DIAGNOSTIC ABNORMALITIES:  CBC, 12/10/2003. Total WBC of 135,000 with 28% segs, 40% bands, 8% metamyelocytes, 9% myelocytes, 1% blasts, and 2% nucleated RBC. Hemoglobin of 14, a hematocrit of 43.2, and platelet count of 146,000.  Leukocyte alkaline phosphatase, 12/10/2003. 19 (13 to 140).  Peripheral smear, 12/10/2003. Marked leukocytosis with entire spectrum of granulocytic precursors from myeloblasts (1%) to PMNs. Occasional nucleated red blood cell. Mild anisocytosis and poikilocytosis. Adequate platelets.  Marrow biopsy, 12/10/2003. Hypercellular, packed bone marrow.  Marked granulocytic hyperplasia with all stages of maturation including 3% blasts. 1+ stainable iron. Occasional dyspoietic megakaryocytes with normal numbers. Cytometry reveals aberrant CD56 expression on myeloid cells. Cytogenetic studies pending.  Ferritin, B12, and LDH, 12/11/2003. 1309, greater than 2000, and 1963 (all grossly elevated).  Liver/spleen scan, 12/11/2003. Abnormally decreased activity within the spleen. Liver parenchyma shows normal homogenous activity without focal masses. Mild hepatomegaly. No colloid shift.  Chest x-ray, 12/11/2003. Normal.  PREVIOUS INTERVENTIONS:  Gleevec, 12/26/2003 through present. Varying doses. Held 01/30/2004 through 02/27/2004.  Held again (), 12/14/2006 through 12/18/2006.     CML (chronic myelocytic leukemia) (CMS/Formerly [facility])   12/10/2003 Initial Diagnosis    CML (chronic myelocytic leukemia) (CMS/Formerly [facility])     11/12/2020 -  Chemotherapy    OP CML Imatinib 400 mg         PAST MEDICAL HISTORY:  ALLERGIES:  No Known Allergies  CURRENT MEDICATIONS:  Outpatient Encounter Medications as of 3/29/2021   Medication Sig Dispense Refill   • citalopram (CeleXA) 40 MG tablet      • FEROSUL 325 (65 Fe) MG tablet TAKE 1 TABLET BY MOUTH TWICE  DAILY 60 tablet 2   • fluticasone (FLONASE) 50 MCG/ACT nasal spray      • gabapentin (NEURONTIN) 300 MG capsule      • imatinib (Gleevec) 100 MG chemo tablet Take 3 tablets by mouth Daily. Take with food and glass of water. Do not take with Grapefruit juice. 30 tablet 11   • meloxicam (MOBIC) 15 MG tablet      • omeprazole (priLOSEC) 40 MG capsule      • oxyCODONE-acetaminophen (PERCOCET)  MG per tablet      • rOPINIRole (REQUIP) 0.5 MG tablet      • tamsulosin (FLOMAX) 0.4 MG capsule 24 hr capsule TK ONE C PO D 30 MINUTES AFTER THE SAME MEAL EACH DAY       Facility-Administered Encounter Medications as of 3/29/2021   Medication Dose Route Frequency Provider Last Rate Last Admin   • magic mouthwash oral suspension (mixture)  5 mL Swish & Spit Q4H PRN Klever Ferreira MD         ADULT ILLNESSES:   CML ( chronic; ICD-10:C92.10 ;Chronic myeloid leukemia, BCR/ABL-positive, not having achieved remission )   Chondromalacia, NOS ( Date of Dx:04/07/2004 Bilateral patellofemoral chondromalacia; ICD-10:M94.20 ;Chondromalacia, unspecified site )   Degenerative joint disease ( ICD-10:M19.90 ;Unspecified osteoarthritis, unspecified site   Drinks alcohol ( consumed a case of beer a week for the past 20-30 years; ICD-10:F10.10 ;Alcohol abuse, uncomplicated )   Ganglion cyst ( excision from right wrist; ICD-10:M67.431 ;Ganglion, right wrist )   Gastroesophageal reflux disease ( ICD-10:K21.9 ;Gastro-esophageal reflux disease without esophagitis   Hepatitis C ( Date of Dx:2000 ; ICD-10:B19.20 ;Unspecified viral hepatitis C without hepatic coma )   Leukopenia ( without neutropenia; ICD-10:D72.819 ;Decreased white blood cell count, unspecified )   Lichen planus ( Recurrent mucocutaneous lichen planus; ICD-10:L43.9 ;Lichen planus, unspecified )   Nephrolithiasis ( on CT scans, 02/2015; ICD-10:N20.0 ;Calculus of kidney )   Normocytic normochromic anemia (disorder) ( ICD-10:D64.9 ;Anemia, unspecified   Restless legs syndrome (  ICD-10:G25.81 ;Restless legs syndrome   Seasonal allergic rhinitis (disorder) ( ICD-10:J30.2 ;Other seasonal allergic rhinitis   Spinal fusion ( from S1-L5; )    SURGERIES:   Spinal fusion, L5-S1, 1982. No details   Core biopsy of the liver, 11/03/2004. Mild portal inflammation. No hepatic steatosis, portal fibrosis, cirrhosis, or metastatic malignancy. Mild increase in stainable iron (3+). Changes consistent with hepatitis C, Scheuer grade 2, Stage 0   Left knee arthroscopy, 2004, Dr. Harmon   Anterior cervical fusion, allograft and [place] plating, 06/02/2010. Dr. Caceres. C5-C6 and C6-C7 abnormalities   Right knee arthroscopy, 09/19/2008, Dr. Stratton   Neck surgery to replace neck plate after a fall on his face, 09/10/2012   Carpal tunnel release, right wrist, 08/2006. Dr. Stratton   Left knee arthroscopy, 07/02/2008, Dr. Stratton   Ganglion cyst removal, left wrist, 01/20/2011. Dr. Thornton   Left knee replacement, 04/18/2018. Dr. Rizo.   Colonoscopy, 03/31/2016. Normal. Repeat 10 years.   Left shoulder/humerus fracture, with ORIF last 04/03/2019. Dr. Mensah.  06/19/2020- EGD normal with biopsies of the small intestine, duodenum: Benign duodenal mucosa with submucosa.  Histologic changes of celiac sprue not identified.  06/19/2020-colonoscopy.  Internal hemorrhoids otherwise normal.      ADULT ILLNESSES:  Patient Active Problem List   Diagnosis Code   • Anemia D64.9   • CML (chronic myelocytic leukemia) (CMS/HCC) C92.10   • Hep C w/o coma, chronic (CMS/HCC) B18.2   • GERD (gastroesophageal reflux disease) K21.9   • Adhesive capsulitis of knee, left M76.892   • Bilateral low back pain with right-sided sciatica M54.41   • Chronic midline low back pain with bilateral sciatica M54.41, M54.42, G89.29   • High risk medications (not anticoagulants) long-term use Z79.899   • Hx of lumbosacral spine surgery Z98.890   • Hyperlipidemia E78.5   • Lumbar facet arthropathy M47.816   • Myofascial pain syndrome M79.18   • Neck pain M54.2      SURGERIES:  Past Surgical History:   Procedure Laterality Date   • ANTERIOR CERVICAL FUSION     • CARPAL TUNNEL RELEASE Right    • COLONOSCOPY  03/31/2016    The entire examined colon is normal on direct and retroflexion views; Repeat 10 years    • COLONOSCOPY N/A 6/13/2019    Non-bleeding internal hemorrhoids; The examination was otherwise normal; No specimens collected; Repeat 10 years   • COLONOSCOPY  03/03/2008    Dr. Geiger-Normal; Repeat 5 years   • ENDOSCOPY N/A 6/19/2019    Normal esophagus; Normal stomach; Normal examined duodenum-biopsied   • GANGLION CYST EXCISION     • KNEE ARTHROSCOPY Left    • KNEE ARTHROSCOPY Right    • LIVER BIOPSY  11/03/2004    Mild portal inflammation; No hepatic steatosis, portal fibrosis, cirrhosis, or metastatic malignancy; mild increase in stainable iron (3+); Changes consistent with Hep C, Scheuer grade 2, stage 0   • NECK SURGERY      To replace neck plate after a fall    • ORIF HUMERUS FRACTURE Left    • OTHER SURGICAL HISTORY      arm surgery with hardware   • REPLACEMENT TOTAL KNEE Left    • SPINAL FUSION       HEALTH MAINTENANCE ITEMS:  Health Maintenance Due   Topic Date Due   • Pneumococcal Vaccine 0-64 (1 of 1 - PPSV23) Never done   • Hepatitis B (1 of 3 - Risk 3-dose series) Never done   • ZOSTER VACCINE (1 of 2) Never done   • TDAP/TD VACCINES (2 - Tdap) 06/03/2007   • ANNUAL WELLNESS VISIT  Never done   • LIPID PANEL  07/08/2020   • INFLUENZA VACCINE  Never done       <no information>  Last Completed Colonoscopy       Status Date      COLONOSCOPY Done 6/13/2019 COLONOSCOPY     Patient has more history with this topic...          There is no immunization history on file for this patient.  Last Completed Mammogram    Patient has no health maintenance due at this time           FAMILY HISTORY:  Family History   Problem Relation Age of Onset   • Prostate cancer Father    • No Known Problems Mother    • No Known Problems Maternal Grandmother    • No Known Problems  "Maternal Grandfather    • No Known Problems Paternal Grandmother    • No Known Problems Paternal Grandfather    • Colon cancer Neg Hx    • Colon polyps Neg Hx      SOCIAL HISTORY:  Social History     Socioeconomic History   • Marital status:      Spouse name: Not on file   • Number of children: Not on file   • Years of education: Not on file   • Highest education level: Not on file   Tobacco Use   • Smoking status: Former Smoker     Quit date: 1975     Years since quittin.8   • Smokeless tobacco: Never Used   Substance and Sexual Activity   • Alcohol use: Yes     Comment: occ   • Drug use: No   • Sexual activity: Defer       REVIEW OF SYSTEMS:  Gleevec tolerance:   No subjective problems with bouts of loose stools. Denies headaches. \"Nope.\"  Says he went 2 weeks without it due to a mailing glitch related to the weather.  Constitutional:   The patient's appetite is good, \"for me.\" His energy is fair, \"so-so.\" Remains active, \"my left shoulder, knee and back all bother.\" Is followed by Dr. Leblanc of Pain Management.  He has no fevers or chills. He has not had any bouts of non-drenching night sweats. He has regained 10 pounds (had lost 10 pounds at his 2 prior visits) in the interval since his last visit. His sleep habits have been fairly good. Received # 1 COVID vaccine ~ 2 weeks ago.  Ear/Nose/Throat/Mouth:   He has perennial sinus symptoms. He has recurrent mouth sores previously modulated by Miracle Mouthwash. \"I gotta work on my teeth.\" He has no ear pains, sore throat, or nosebleeds. He has no headaches.  Ocular:   He denies eye pain, significant change in visual acuity, double vision, or blurry vision.  Respiratory:   He has no significant shortness of breathing, cough, phlegm production, or unexplained chest wall pain.  Cardiovascular:   He has no exertional chest pain. He has no claudication. He has no palpitations or symptomatic orthostasis. Says he had a stress test last 2010 per  " "[facility], reportedly negative.  Gastrointestinal:   He has not had any bouts of postprandial pyrosis on omeprazole. He has no dysphagia, nausea, vomiting, no bloating, abdominal pain or cramping. He has no jaundice. He has infrequent loose stools. Has no dark (off oral iron) discoloration of the stool. Was tolerating oral iron bid.  He has had no rectal bleeding. He has not been constipated in spite of Percocet 2-4/day (from 5 doses/day). Had repeat colonoscopy (above), 03/31/2016. Normal.   Genitourinary:   He has no urinary burning, frequency, dribbling, or discoloration. He has no difficulty controlling his bladder. He has no need to urinate frequently through the night.  Musculoskeletal:   He has chronic left knee, neck, left shoulder and back pains. He still uses Percocet (OxyContin stopped by Dr. Leblanc). His left knee still bothers him. Dr. Rizo follows. He has lingering left shoulder pains after ORIF last 04/05/2019.   Endocrine:   He has no problems with excess thirst, excessive urination, vasomotor instability, or unexplained fatigue.  Heme/Lymphatic:   He has no unexplained bleeding, bruising, petechial rashes, or swollen glands.  Skin:   He has not had any recurrent furuncles. He has no itching.  Neuro:   The right hand numbness and weakness seems to have resolved (had redo cervical fusion, 09/2012). He has no loss of consciousness, seizures, fainting spells, or dizziness. He has no weakness of his face, arms, or legs. He has no difficulty with speech. He has no tremors. He has intermittent restless legs. Has been on Neurontin to good effect.  Psych:   He seems generally satisfied with life. He denies depression or anxiety.      VITAL SIGNS: /92   Pulse 76   Temp 97.8 °F (36.6 °C)   Resp 16   Ht 165.1 cm (65\")   Wt 71.1 kg (156 lb 12.8 oz)   SpO2 97%   BMI 26.09 kg/m² Body surface area is 1.78 meters squared.  Pain Score    03/29/21 1359   PainSc:   7         PHYSICAL EXAMINATION:   General: "   He is an extremely-pleasant, more slender, leaner, modestly kept male in no distress. He is ambulatory. ECOG PS = 0  Head/Neck:   The patient is anicteric. Wearing a surgical mask.  Poor dentition. The trachea is midline. The neck is supple without evidence of jugular venous distention or cervical adenopathy.   Eyes:   The extraocular movements are full. There is no scleral jaundice or erythema.  Chest:  The respiratory efforts are normal and unhindered. The chest is clear to auscultation. There are no wheezes, rhonchi, rales, or asymmetry of breath sounds.  Cardiovascular:   The patient has a regular cardiac rate and rhythm without murmurs, rubs, or gallops.  Abdomen:   The belly is soft and slightly globose. There is no rebound or guarding. There is no organomegaly, mass-effect, or tenderness.  Extremities:   There is no evidence of cyanosis, clubbing, or edema.   Rheumatologic:   There is no overt evidence of rheumatoid deformities of the hands. There is no sausaging of the fingers. There is no sign of active synovitis. The gait is no longer antalgic (prior left knee replacement).  Cutaneous:   There are no disseminated lesions, purpura, or petechiae.   Lymphatics:   There is no evidence of adenopathy in the cervical, supraclavicular, axillary, inguinal, or femoral areas. There is no splenomegaly.  Neurologic:   The patient is alert, oriented, cooperative, and pleasant. He is appropriately conversant. He is fully ambulatory and able to get up onto the exam table without assistance. There is no overt dysfunction of the motor, sensory, or cerebellar systems.  Psych:   Mood and affect are appropriate for circumstance. Eye contact is appropriate.        LABS    Lab Results - Last 18 Months   Lab Units 03/24/21  0946 01/13/21  1352 07/01/20  1312 03/16/20  1119   HEMOGLOBIN g/dL 15.7 14.3 15.1 14.8   HEMATOCRIT % 46.0 44.4 45.9 44.0   MCV fL 89.1 93.1 90.7 91.3   WBC 10*3/mm3 6.61 6.29 6.75 7.45   RDW % 13.8 13.5  14.7 14.0   MPV fL 9.5 9.8 9.7 9.7   PLATELETS 10*3/mm3 238 190 232 235   IMM GRAN % % 0.3 0.3 0.1 0.1   NEUTROS ABS 10*3/mm3 3.87 3.46 3.78 4.43   LYMPHS ABS 10*3/mm3 1.41 1.65 1.69 2.03   MONOS ABS 10*3/mm3 0.71 0.60 0.58 0.58   EOS ABS 10*3/mm3 0.53* 0.51* 0.60* 0.35   BASOS ABS 10*3/mm3 0.07 0.05 0.09 0.05   IMMATURE GRANS (ABS) 10*3/mm3 0.02 0.02 0.01 0.01   NRBC /100 WBC 0.0 0.0 0.0 0.0       Lab Results - Last 18 Months   Lab Units 03/24/21  0946 01/13/21  1352 07/01/20  1312 03/16/20  1119   GLUCOSE mg/dL 114* 97 108* 115*   SODIUM mmol/L 136 141 142 139   POTASSIUM mmol/L 4.0 4.1 4.0 3.9   CO2 mmol/L 29.0 30.0* 27.0 28.0   CHLORIDE mmol/L 99 103 102 100   ANION GAP mmol/L 8.0 8.0 13.0 11.0   CREATININE mg/dL 1.14 1.08 0.95 1.13   BUN mg/dL 13 13 8 11   BUN / CREAT RATIO  11.4 12.0 8.4 9.7   CALCIUM mg/dL 8.8 9.1 9.2 8.9   EGFR IF NONAFRICN AM mL/min/1.73 65 69 80 66   ALK PHOS U/L 78 67 68 69   TOTAL PROTEIN g/dL 7.4 7.0 6.6 6.8   ALT (SGPT) U/L 30 21 24 22   AST (SGOT) U/L 40 28 30 27   BILIRUBIN mg/dL 1.2 0.6 1.0 0.6   ALBUMIN g/dL 4.30 4.20 4.30 4.20   GLOBULIN gm/dL 3.1 2.8 2.3 2.6       Lab Results - Last 18 Months   Lab Units 01/13/21  1352   REFERENCE LAB REPORT  see attached report       Lab Results - Last 18 Months   Lab Units 03/24/21  0946 01/13/21  1352 07/01/20  1312 03/16/20  1119   IRON mcg/dL 147 110 88 115   TIBC mcg/dL 399 364 350 374   IRON SATURATION % 37 30 25 31   FERRITIN ng/mL 190.50 124.80 147.50 127.60   FOLATE ng/mL  --   --  16.00 12.10       ASSESSMENT:   1. Chronic myelogenous leukemia (CML):   Stage: Chronic phase.   Tumor burden: Marrow involvement. No hepatosplenomegaly.   Complications of tumor: None. No manifestations of hyperviscosity.   Tolerance to therapy: Cytopenias and liver function test (LFT) abnormalities.   Status: Cytogenetic remission, with hematologic remission. No detectable disease (bone marrow biopsy, 03/30/2007 - no BCR-ABL fusion transcript was detected  "by real-time PCR) on current dose of Gleevec.   No BCR-ABL transcript detected (IS: less than 0.1% - Major Molecular Response), 09/06/2018.   NEGATIVE for the BCR-ABL1 e1a2 (p190), e13a2 (b2a2, p210) and e14a2 (b3a2, p210) fusion transcripts, 01/22/2019. The standardized baseline is 100% BCR-ABL1 (IS) and major molecular response (MMR) is equivalent to 0.1% BCRABL1 (IS)   NEGATIVE for the BCR-ABL1 e1a2 (p190), e13a2 (b2a2, p210) and e14a2 (b3a2, p210) fusion transcripts, 05/17/2019. The standardized baseline is 100% BCR-ABL1 (IS) and major molecular response (MMR) is equivalent to 0.1% BCRABL1 (IS)  01/13/2021-FISH for BCR/ABL 1 rearrangement-findings: Negative for BCR/ABL 1 rearrangement (percent positive nuclei-0).  2. Hepatitis C with chronic elevation of liver function tests (LFTs).   Previous liver biopsy negative for cirrhosis or malignancy.   Colonoscopy 03/31/2016. The entire examined colon appeared normal on direct and retroflexion views.   Completed hepatitis C Rx (Harvoni) beginning 05/13/2016 (daily x8 weeks). Followed for gastroenterology (GI) by Dr. Rosas.   Recurrent transaminitis, 05/17/2019 06/06/2019- liver ultrasound.  Impression: Mildly coarsened liver echogenicity which can be seen with chronic liver disease.  06/19/2019-EGD normal.  Colonoscopy with internal hemorrhoids otherwise normal.  3. Normocytic anemia, Gleevec associated. Stable, Hgb 15.7 on 03/24/2021 (prior range: Hgb 12.6 - 15.1).   4. Leukopenia without neutropenia. Gleevec associated. Normal counts since 12/14/2016.   5. Acute kidney injury. Improved, GFR 65 mL/min, 03/24/2021 (prior range: 59.6 -72 mL/min). Followed by nephrology.   6. Gastroesophageal reflux disease (GERD), well-modulated on proton pump inhibitor (PPI).   7. Degenerative joint disease (DJD), knees and back especially. Pain management ([place] - Dr. Rubio) follows.   8. Alcohol (ETOH) use. Says he has not imbibed at all since 07/2009, \"not since.\"   9. C5-C6 " "and C6-C7 abnormalities with disk rupture with brachial neuritis/radiculitis. Had prior anterior cervical fusion, allograft, and [place] plating, 06/02/2010. Dr. Caceres. Underwent redo surgery, 09/2012 after a fall and hyperextension injury.   10. Restless legs, well-modulated on Neurontin as needed.   11. Seen by [facility] (Dr. Rubio), 08/22/2018. Impression: Myofascial pain syndrome/radiculopathy of lumbar region. Given Rx for Percocet. Assumed opioid prescribing.   12. Poor dentitio  13. A bit self directed.     PLAN:   1.  Re: Apprise of labs from 03/24/2021 with resolution of anemia, normal WBC and otherwise normal CBC, and other labs including the stable GFR, normal AST/ALT, slightly depressed phosphorus, otherwise normal CMP, repleted ferritin, repleted iron, repleted Fe sat (> 20%).  Pending BCR/ABL/CML FISH profile  2.  Re: Note the BCR/ABL FISH report, 01/13/2021- negative for detection of the BCR/ABL fusion gene (0 % -positive nuclei).   3. Continue Gleevec 300 mg daily ([facility] mails to him).   4.  Previously apprised of liver ultrasound, 06/06/2019 showing coarse liver and chronic liver disease, and EGD/colonoscopy, 06/19/2019 (above) with normal EGD and colonoscopy showing internal hemorrhoids otherwise normal.    5.  STOP Ferrous sulfate (ferritin > 100)         6.  Rx: K-Phos p.o. 3 times daily x3 days                    Magic mouthwash 4-5x/day x 30 day supply          7. Continue other currently identified medications. He gets his Gleevec mailed in - [facility].   8. Continue ongoing management per primary care physician and other specialists. Is going to make an appointment to be seen by his dentist.  \"I need all these bad teeth pulled.\"  9. Return to the [place] office with pre-office RT-PCR for BCR-ABL, serum iron, Fe saturation, ferritin, CMP, phosphorus, and CBC with differential in 24 weeks.    MEDICAL DECISION MAKING: High Complexity   AMOUNT OF DATA: Moderate to " Extensive     I spent      total minutes, face-to-face, caring for Cayetano today.  Greater than 50% of this time involved counseling and/or coordination of care as documented within this note regarding the patient's illness(es), pros and cons of various treatment options, instructions and/or risk reduction.    cc:  NEHAL Wallace

## 2021-03-23 DIAGNOSIS — C92.10 CML (CHRONIC MYELOCYTIC LEUKEMIA) (HCC): Primary | ICD-10-CM

## 2021-03-24 ENCOUNTER — LAB (OUTPATIENT)
Dept: LAB | Facility: HOSPITAL | Age: 66
End: 2021-03-24

## 2021-03-24 DIAGNOSIS — C92.10 CML (CHRONIC MYELOCYTIC LEUKEMIA) (HCC): ICD-10-CM

## 2021-03-24 LAB
ALBUMIN SERPL-MCNC: 4.3 G/DL (ref 3.5–5.2)
ALBUMIN/GLOB SERPL: 1.4 G/DL
ALP SERPL-CCNC: 78 U/L (ref 39–117)
ALT SERPL W P-5'-P-CCNC: 30 U/L (ref 1–41)
ANION GAP SERPL CALCULATED.3IONS-SCNC: 8 MMOL/L (ref 5–15)
AST SERPL-CCNC: 40 U/L (ref 1–40)
BASOPHILS # BLD AUTO: 0.07 10*3/MM3 (ref 0–0.2)
BASOPHILS NFR BLD AUTO: 1.1 % (ref 0–1.5)
BILIRUB SERPL-MCNC: 1.2 MG/DL (ref 0–1.2)
BUN SERPL-MCNC: 13 MG/DL (ref 8–23)
BUN/CREAT SERPL: 11.4 (ref 7–25)
CALCIUM SPEC-SCNC: 8.8 MG/DL (ref 8.6–10.5)
CHLORIDE SERPL-SCNC: 99 MMOL/L (ref 98–107)
CO2 SERPL-SCNC: 29 MMOL/L (ref 22–29)
CREAT SERPL-MCNC: 1.14 MG/DL (ref 0.76–1.27)
DEPRECATED RDW RBC AUTO: 45.3 FL (ref 37–54)
EOSINOPHIL # BLD AUTO: 0.53 10*3/MM3 (ref 0–0.4)
EOSINOPHIL NFR BLD AUTO: 8 % (ref 0.3–6.2)
ERYTHROCYTE [DISTWIDTH] IN BLOOD BY AUTOMATED COUNT: 13.8 % (ref 12.3–15.4)
FERRITIN SERPL-MCNC: 190.5 NG/ML (ref 30–400)
GFR SERPL CREATININE-BSD FRML MDRD: 65 ML/MIN/1.73
GLOBULIN UR ELPH-MCNC: 3.1 GM/DL
GLUCOSE SERPL-MCNC: 114 MG/DL (ref 65–99)
HCT VFR BLD AUTO: 46 % (ref 37.5–51)
HGB BLD-MCNC: 15.7 G/DL (ref 13–17.7)
HOLD SPECIMEN: NORMAL
IMM GRANULOCYTES # BLD AUTO: 0.02 10*3/MM3 (ref 0–0.05)
IMM GRANULOCYTES NFR BLD AUTO: 0.3 % (ref 0–0.5)
IRON 24H UR-MRATE: 147 MCG/DL (ref 59–158)
IRON SATN MFR SERPL: 37 % (ref 20–50)
LYMPHOCYTES # BLD AUTO: 1.41 10*3/MM3 (ref 0.7–3.1)
LYMPHOCYTES NFR BLD AUTO: 21.3 % (ref 19.6–45.3)
MCH RBC QN AUTO: 30.4 PG (ref 26.6–33)
MCHC RBC AUTO-ENTMCNC: 34.1 G/DL (ref 31.5–35.7)
MCV RBC AUTO: 89.1 FL (ref 79–97)
MONOCYTES # BLD AUTO: 0.71 10*3/MM3 (ref 0.1–0.9)
MONOCYTES NFR BLD AUTO: 10.7 % (ref 5–12)
NEUTROPHILS NFR BLD AUTO: 3.87 10*3/MM3 (ref 1.7–7)
NEUTROPHILS NFR BLD AUTO: 58.6 % (ref 42.7–76)
NRBC BLD AUTO-RTO: 0 /100 WBC (ref 0–0.2)
PHOSPHATE SERPL-MCNC: 2.3 MG/DL (ref 2.5–4.5)
PLATELET # BLD AUTO: 238 10*3/MM3 (ref 140–450)
PMV BLD AUTO: 9.5 FL (ref 6–12)
POTASSIUM SERPL-SCNC: 4 MMOL/L (ref 3.5–5.2)
PROT SERPL-MCNC: 7.4 G/DL (ref 6–8.5)
RBC # BLD AUTO: 5.16 10*6/MM3 (ref 4.14–5.8)
SODIUM SERPL-SCNC: 136 MMOL/L (ref 136–145)
TIBC SERPL-MCNC: 399 MCG/DL (ref 298–536)
TRANSFERRIN SERPL-MCNC: 268 MG/DL (ref 200–360)
WBC # BLD AUTO: 6.61 10*3/MM3 (ref 3.4–10.8)

## 2021-03-24 PROCEDURE — 84466 ASSAY OF TRANSFERRIN: CPT

## 2021-03-24 PROCEDURE — 83540 ASSAY OF IRON: CPT

## 2021-03-24 PROCEDURE — 84100 ASSAY OF PHOSPHORUS: CPT

## 2021-03-24 PROCEDURE — 36415 COLL VENOUS BLD VENIPUNCTURE: CPT

## 2021-03-24 PROCEDURE — 82728 ASSAY OF FERRITIN: CPT

## 2021-03-24 PROCEDURE — 85025 COMPLETE CBC W/AUTO DIFF WBC: CPT

## 2021-03-24 PROCEDURE — 80053 COMPREHEN METABOLIC PANEL: CPT

## 2021-03-29 ENCOUNTER — OFFICE VISIT (OUTPATIENT)
Dept: ONCOLOGY | Facility: CLINIC | Age: 66
End: 2021-03-29

## 2021-03-29 VITALS
TEMPERATURE: 97.8 F | SYSTOLIC BLOOD PRESSURE: 138 MMHG | BODY MASS INDEX: 26.12 KG/M2 | OXYGEN SATURATION: 97 % | RESPIRATION RATE: 16 BRPM | HEIGHT: 65 IN | DIASTOLIC BLOOD PRESSURE: 92 MMHG | WEIGHT: 156.8 LBS | HEART RATE: 76 BPM

## 2021-03-29 DIAGNOSIS — C92.10 CML (CHRONIC MYELOCYTIC LEUKEMIA) (HCC): Primary | ICD-10-CM

## 2021-03-29 PROCEDURE — 99214 OFFICE O/P EST MOD 30 MIN: CPT | Performed by: INTERNAL MEDICINE

## 2021-03-29 RX ORDER — POTASSIUM PHOSPHATE, MONOBASIC 500 MG/1
500 TABLET, SOLUBLE ORAL 3 TIMES DAILY
Qty: 9 TABLET | Refills: 0 | OUTPATIENT
Start: 2021-03-29 | End: 2021-04-01

## 2021-04-15 ENCOUNTER — SPECIALTY PHARMACY (OUTPATIENT)
Dept: ONCOLOGY | Facility: HOSPITAL | Age: 66
End: 2021-04-15

## 2021-04-15 LAB — REF LAB TEST METHOD: NORMAL

## 2021-04-15 NOTE — PROGRESS NOTES
[facility]    Oral Oncology Patient Follow-Up      Consult Details  Consulting Provider:   Klever Ferreira MD ([facility])   Medication and Regimen:  Imatinib [Gleevec] 300 mg PO daily     Prescription Review  Dosing & Interactions:   Reviewed, appropriate and no significant interaction noted at this time..   Current Specialty Pharmacy [facility]     Intervention(s):                  Patient reports doing well and voices no adverse reactions or other concerns. His refills have been coming from [facility] and have been arriving on time. No other questions at this time.      Summary:    Will continue to follow.      Sirena Peraza, JacquelynD  04/15/2021 17:16 CDT

## 2021-04-30 LAB — REF LAB TEST METHOD: NORMAL

## 2021-06-25 ENCOUNTER — SPECIALTY PHARMACY (OUTPATIENT)
Dept: ONCOLOGY | Facility: HOSPITAL | Age: 66
End: 2021-06-25

## 2021-06-25 NOTE — PROGRESS NOTES
[facility]    Oral Oncology Patient Follow-Up      Consult Details  Consulting Provider:   Klever Ferreira MD ([facility])   Medication and Regimen:  Imatinib [Gleevec] 300 mg po daily     Prescription Review  Dosing & Interactions:   Reviewed, appropriate and no significant interaction noted at this time..   Current Specialty Pharmacy [facility] ([place])     Intervention(s):                  Contacted Mr. Curry for routine follow up of his Gleevec therapy and spoke with his wife (Susana) who states he's doing well, not having any problems.  She indicates refills have been arriving appropriately via [facility].  No questions at this time, encouraged her or patient to reach out if we may be of assistance.     Summary:    Continue to follow.     Nohemi Subramanian, PharmD  06/25/2021 14:50 CDT

## 2021-07-24 ENCOUNTER — SPECIALTY PHARMACY (OUTPATIENT)
Dept: ONCOLOGY | Facility: HOSPITAL | Age: 66
End: 2021-07-24

## 2021-07-24 NOTE — PROGRESS NOTES
[facility]    Oral Oncology Patient Follow-Up      Consult Details  Consulting Provider:   Klever Ferreira MD ([facility])   Medication and Regimen:  Gleevec 300 mg PO daily     Prescription Review  Dosing & Interactions:   Reviewed, appropriate and no significant interaction noted at this time.., no changes to meds since last eval   Current Specialty Pharmacy [facility]     Intervention(s):                  I called Cayetano Curry to see how he was doing on Gleevec. Spoke with his spouse today. She reports he is doing very well. No issues with the medication, no changes since last check-in. They receive the medication from [facility] specialty and have no issues there.      Summary:    Patient doing well. We will continue to follow.      Micah Fox, PharmD  07/24/2021 17:06 CDT

## 2021-08-20 ENCOUNTER — PATIENT OUTREACH (OUTPATIENT)
Dept: ONCOLOGY | Facility: HOSPITAL | Age: 66
End: 2021-08-20

## 2021-08-20 NOTE — OUTREACH NOTE
[facility]    Oral Oncology Patient Outreach      Prescription Details  Consulting Provider:   Klever Ferreira MD ([facility])   Medication and Regimen:  Gleevec 300 mg PO daily       An attempt was made by the Oral Oncology Clinic to contact this patient for a follow-up on their chemotherapy medication. The Oral Oncology Clinic can be reached at [phone].        Kirsten Campos, Antoinette  08/20/21  17:28 CDT

## 2021-08-23 ENCOUNTER — SPECIALTY PHARMACY (OUTPATIENT)
Dept: ONCOLOGY | Facility: HOSPITAL | Age: 66
End: 2021-08-23

## 2021-08-23 NOTE — PROGRESS NOTES
[facility]    Oral Oncology Patient Follow-Up      Consult Details  Consulting Provider:   Klever Ferreira MD ([facility])   Medication and Regimen:  Gleevec 300mg PO daily.      Prescription Review  Dosing & Interactions:   Reviewed, appropriate and no significant interaction noted at this time..   Current Specialty Pharmacy [facility]     Intervention(s):                  I spoke with Cayetano Curry today for routine follow up regarding his Gleevec therapy. He reports he is doing well. He denies any questions, needs or concerns at this time. He is familiar with his regimen. He said no refills were needed at this time and [facility] lets him know when his prescription needs to be renewed by the physician.        Summary:    Patient is doing well.  He has no questions or concerns at this time.  Will follow-up in 28 days.      Gerardo Agrawal, PharmD  08/23/2021 16:16 CDT

## 2021-09-22 ENCOUNTER — SPECIALTY PHARMACY (OUTPATIENT)
Dept: ONCOLOGY | Facility: HOSPITAL | Age: 66
End: 2021-09-22

## 2021-09-22 ENCOUNTER — LAB (OUTPATIENT)
Dept: LAB | Facility: HOSPITAL | Age: 66
End: 2021-09-22

## 2021-09-22 DIAGNOSIS — C92.10 CML (CHRONIC MYELOCYTIC LEUKEMIA) (HCC): ICD-10-CM

## 2021-09-22 LAB
ALBUMIN SERPL-MCNC: 4.6 G/DL (ref 3.5–5.2)
ALBUMIN/GLOB SERPL: 1.8 G/DL
ALP SERPL-CCNC: 64 U/L (ref 39–117)
ALT SERPL W P-5'-P-CCNC: 63 U/L (ref 1–41)
ANION GAP SERPL CALCULATED.3IONS-SCNC: 8 MMOL/L (ref 5–15)
AST SERPL-CCNC: 75 U/L (ref 1–40)
BASOPHILS # BLD AUTO: 0.05 10*3/MM3 (ref 0–0.2)
BASOPHILS NFR BLD AUTO: 1 % (ref 0–1.5)
BILIRUB SERPL-MCNC: 0.7 MG/DL (ref 0–1.2)
BUN SERPL-MCNC: 9 MG/DL (ref 8–23)
BUN/CREAT SERPL: 9 (ref 7–25)
CALCIUM SPEC-SCNC: 9.1 MG/DL (ref 8.6–10.5)
CHLORIDE SERPL-SCNC: 102 MMOL/L (ref 98–107)
CO2 SERPL-SCNC: 29 MMOL/L (ref 22–29)
CREAT SERPL-MCNC: 1 MG/DL (ref 0.76–1.27)
DEPRECATED RDW RBC AUTO: 48.2 FL (ref 37–54)
EOSINOPHIL # BLD AUTO: 0.33 10*3/MM3 (ref 0–0.4)
EOSINOPHIL NFR BLD AUTO: 6.3 % (ref 0.3–6.2)
ERYTHROCYTE [DISTWIDTH] IN BLOOD BY AUTOMATED COUNT: 13.7 % (ref 12.3–15.4)
FERRITIN SERPL-MCNC: 305.8 NG/ML (ref 30–400)
GFR SERPL CREATININE-BSD FRML MDRD: 75 ML/MIN/1.73
GLOBULIN UR ELPH-MCNC: 2.5 GM/DL
GLUCOSE SERPL-MCNC: 123 MG/DL (ref 65–99)
HCT VFR BLD AUTO: 42.9 % (ref 37.5–51)
HGB BLD-MCNC: 14 G/DL (ref 13–17.7)
IMM GRANULOCYTES # BLD AUTO: 0.02 10*3/MM3 (ref 0–0.05)
IMM GRANULOCYTES NFR BLD AUTO: 0.4 % (ref 0–0.5)
IRON 24H UR-MRATE: 86 MCG/DL (ref 59–158)
IRON SATN MFR SERPL: 26 % (ref 20–50)
LYMPHOCYTES # BLD AUTO: 1.8 10*3/MM3 (ref 0.7–3.1)
LYMPHOCYTES NFR BLD AUTO: 34.2 % (ref 19.6–45.3)
MCH RBC QN AUTO: 31 PG (ref 26.6–33)
MCHC RBC AUTO-ENTMCNC: 32.6 G/DL (ref 31.5–35.7)
MCV RBC AUTO: 94.9 FL (ref 79–97)
MONOCYTES # BLD AUTO: 0.62 10*3/MM3 (ref 0.1–0.9)
MONOCYTES NFR BLD AUTO: 11.8 % (ref 5–12)
NEUTROPHILS NFR BLD AUTO: 2.44 10*3/MM3 (ref 1.7–7)
NEUTROPHILS NFR BLD AUTO: 46.3 % (ref 42.7–76)
NRBC BLD AUTO-RTO: 0 /100 WBC (ref 0–0.2)
PHOSPHATE SERPL-MCNC: 2.2 MG/DL (ref 2.5–4.5)
PLATELET # BLD AUTO: 196 10*3/MM3 (ref 140–450)
PMV BLD AUTO: 10.4 FL (ref 6–12)
POTASSIUM SERPL-SCNC: 3.9 MMOL/L (ref 3.5–5.2)
PROT SERPL-MCNC: 7.1 G/DL (ref 6–8.5)
RBC # BLD AUTO: 4.52 10*6/MM3 (ref 4.14–5.8)
SODIUM SERPL-SCNC: 139 MMOL/L (ref 136–145)
TIBC SERPL-MCNC: 326 MCG/DL (ref 298–536)
TRANSFERRIN SERPL-MCNC: 219 MG/DL (ref 200–360)
WBC # BLD AUTO: 5.26 10*3/MM3 (ref 3.4–10.8)

## 2021-09-22 PROCEDURE — 36415 COLL VENOUS BLD VENIPUNCTURE: CPT

## 2021-09-22 PROCEDURE — 80053 COMPREHEN METABOLIC PANEL: CPT

## 2021-09-22 PROCEDURE — 84100 ASSAY OF PHOSPHORUS: CPT

## 2021-09-22 PROCEDURE — 83540 ASSAY OF IRON: CPT

## 2021-09-22 PROCEDURE — 84466 ASSAY OF TRANSFERRIN: CPT

## 2021-09-22 PROCEDURE — 82728 ASSAY OF FERRITIN: CPT

## 2021-09-22 PROCEDURE — 85025 COMPLETE CBC W/AUTO DIFF WBC: CPT

## 2021-09-22 NOTE — PROGRESS NOTES
[facility]    Oral Oncology Patient Follow-Up      Consult Details  Consulting Provider:   Klever Ferreira MD ([facility])   Medication and Regimen:  Gleevec 300 mg PO daily     Prescription Review  Dosing & Interactions:   Reviewed, appropriate and no significant interaction noted at this time..   Current Specialty Pharmacy [facility]     Intervention(s):                  I called Cayetano Curry today for routine follow up regarding Gleevec. Spoke with spouse Susana. She reports he is doing well on the medication. No issues to report. He had labs today and has an appointment with Dr. Ferreira next week. He has a good supply of medication. Encouraged them to call if anything comes up before his appointment.      Summary:    Patient doing well. Continue routine follow up.      Micah Fox, PharmD  09/22/2021 17:28 CDT

## 2021-09-24 NOTE — PROGRESS NOTES
"[facility] HEMATOLOGY AND ONCOLOGY  [address]  [facility] [address] [phone]    Patient Name: Cayetano Curry  Encounter Date: 09/29/2021  YOB: 1955  Patient Number: [id number]      REASON FOR VISIT:  Mr. Monteiro \"Cotton\" Patricio is a 66-year-old male who returns in follow-up of his chronic phase chronic myelogenous leukemia (CML). It has been about 213 months since initiation of Gleevec. The patient is here alone.     I have reviewed the HPI and verified with the patient the accuracy of it. No changes to interval history since the information was documented. Klever Ferreira MD 09/29/21     DIAGNOSTIC ABNORMALITIES:   1. CBC, 12/10/2003. Total WBC of 135,000 with 28% segs, 40% bands, 8% metamyelocytes, 9% myelocytes, 1% blasts, and 2% nucleated RBC. Hemoglobin of 14, a hematocrit of 43.2, and platelet count of 146,000.  2. Leukocyte alkaline phosphatase, 12/10/2003. 19 (13 to 140).  3. Peripheral smear, 12/10/2003. Marked leukocytosis with entire spectrum of granulocytic precursors from myeloblasts (1%) to PMNs. Occasional nucleated red blood cell. Mild anisocytosis and poikilocytosis. Adequate platelets.  4. Marrow biopsy, 12/10/2003. Hypercellular, packed bone marrow. Marked granulocytic hyperplasia with all stages of maturation including 3% blasts. 1+ stainable iron. Occasional dyspoietic megakaryocytes with normal numbers. Cytometry reveals aberrant CD56 expression on myeloid cells. Cytogenetic studies pending.  5. Ferritin, B12, and LDH, 12/11/2003. 1309, greater than 2000, and 1963 (all grossly elevated).  6. Liver/spleen scan, 12/11/2003. Abnormally decreased activity within the spleen. Liver parenchyma shows normal homogenous activity without focal masses. Mild hepatomegaly. No colloid shift.  7. Chest x-ray, 12/11/2003. Normal.    PREVIOUS INTERVENTIONS:   1. Gleevec, 12/26/2003 through present. Varying doses. Held " 01/30/2004 through 02/27/2004. Held again (), 12/14/2006 through 12/18/2006.        Problem List Items Addressed This Visit     None        Oncology/Hematology History Overview Note   DIAGNOSTIC ABNORMALITIES:  CBC, 12/10/2003. Total WBC of 135,000 with 28% segs, 40% bands, 8% metamyelocytes, 9% myelocytes, 1% blasts, and 2% nucleated RBC. Hemoglobin of 14, a hematocrit of 43.2, and platelet count of 146,000.  Leukocyte alkaline phosphatase, 12/10/2003. 19 (13 to 140).  Peripheral smear, 12/10/2003. Marked leukocytosis with entire spectrum of granulocytic precursors from myeloblasts (1%) to PMNs. Occasional nucleated red blood cell. Mild anisocytosis and poikilocytosis. Adequate platelets.  Marrow biopsy, 12/10/2003. Hypercellular, packed bone marrow.  Marked granulocytic hyperplasia with all stages of maturation including 3% blasts. 1+ stainable iron. Occasional dyspoietic megakaryocytes with normal numbers. Cytometry reveals aberrant CD56 expression on myeloid cells. Cytogenetic studies pending.  Ferritin, B12, and LDH, 12/11/2003. 1309, greater than 2000, and 1963 (all grossly elevated).  Liver/spleen scan, 12/11/2003. Abnormally decreased activity within the spleen. Liver parenchyma shows normal homogenous activity without focal masses. Mild hepatomegaly. No colloid shift.  Chest x-ray, 12/11/2003. Normal.  PREVIOUS INTERVENTIONS:  Gleevec, 12/26/2003 through present. Varying doses. Held 01/30/2004 through 02/27/2004.  Held again (), 12/14/2006 through 12/18/2006.     CML (chronic myelocytic leukemia) (CMS/HCC)   12/10/2003 Initial Diagnosis    CML (chronic myelocytic leukemia) (CMS/HCC)     11/12/2020 -  Chemotherapy    OP CML Imatinib 400 mg         PAST MEDICAL HISTORY:  ALLERGIES:  No Known Allergies  CURRENT MEDICATIONS:  Outpatient Encounter Medications as of 9/29/2021   Medication Sig Dispense Refill   • citalopram (CeleXA) 40 MG tablet      • FEROSUL 325 (65 Fe) MG tablet TAKE 1 TABLET BY  MOUTH TWICE DAILY 60 tablet 2   • fluticasone (FLONASE) 50 MCG/ACT nasal spray      • gabapentin (NEURONTIN) 300 MG capsule      • imatinib (Gleevec) 100 MG chemo tablet Take 3 tablets by mouth Daily. Take with food and glass of water. Do not take with Grapefruit juice. 30 tablet 11   • meloxicam (MOBIC) 15 MG tablet      • Miracle mouthwash oral suspension Swish and spit 10 mL Every 4 (Four) Hours As Needed for Mucositis. 300 mL 0   • omeprazole (priLOSEC) 40 MG capsule      • rOPINIRole (REQUIP) 0.5 MG tablet      • tamsulosin (FLOMAX) 0.4 MG capsule 24 hr capsule TK ONE C PO D 30 MINUTES AFTER THE SAME MEAL EACH DAY     • [DISCONTINUED] oxyCODONE-acetaminophen (PERCOCET)  MG per tablet        No facility-administered encounter medications on file as of 9/29/2021.     ADULT ILLNESSES:   CML ( chronic; ICD-10:C92.10 ;Chronic myeloid leukemia, BCR/ABL-positive, not having achieved remission )   Chondromalacia, NOS ( Date of Dx:04/07/2004 Bilateral patellofemoral chondromalacia; ICD-10:M94.20 ;Chondromalacia, unspecified site )   Degenerative joint disease ( ICD-10:M19.90 ;Unspecified osteoarthritis, unspecified site   Drinks alcohol ( consumed a case of beer a week for the past 20-30 years; ICD-10:F10.10 ;Alcohol abuse, uncomplicated )   Ganglion cyst ( excision from right wrist; ICD-10:M67.431 ;Ganglion, right wrist )   Gastroesophageal reflux disease ( ICD-10:K21.9 ;Gastro-esophageal reflux disease without esophagitis   Hepatitis C ( Date of Dx:2000 ; ICD-10:B19.20 ;Unspecified viral hepatitis C without hepatic coma )   Leukopenia ( without neutropenia; ICD-10:D72.819 ;Decreased white blood cell count, unspecified )   Lichen planus ( Recurrent mucocutaneous lichen planus; ICD-10:L43.9 ;Lichen planus, unspecified )   Nephrolithiasis ( on CT scans, 02/2015; ICD-10:N20.0 ;Calculus of kidney )   Normocytic normochromic anemia (disorder) ( ICD-10:D64.9 ;Anemia, unspecified   Restless legs syndrome ( ICD-10:G25.81  ;Restless legs syndrome   Seasonal allergic rhinitis (disorder) ( ICD-10:J30.2 ;Other seasonal allergic rhinitis   Spinal fusion ( from S1-L5; )    SURGERIES:   Spinal fusion, L5-S1, 1982. No details   Core biopsy of the liver, 11/03/2004. Mild portal inflammation. No hepatic steatosis, portal fibrosis, cirrhosis, or metastatic malignancy. Mild increase in stainable iron (3+). Changes consistent with hepatitis C, Scheuer grade 2, Stage 0   Left knee arthroscopy, 2004, Dr. Harmon   Anterior cervical fusion, allograft and [place] plating, 06/02/2010. Dr. Caceres. C5-C6 and C6-C7 abnormalities   Right knee arthroscopy, 09/19/2008, Dr. Stratton   Neck surgery to replace neck plate after a fall on his face, 09/10/2012   Carpal tunnel release, right wrist, 08/2006. Dr. Stratton   Left knee arthroscopy, 07/02/2008, Dr. Stratton   Ganglion cyst removal, left wrist, 01/20/2011. Dr. Thornton   Left knee replacement, 04/18/2018. Dr. Rizo.   Colonoscopy, 03/31/2016. Normal. Repeat 10 years.   Left shoulder/humerus fracture, with ORIF last 04/03/2019. Dr. Mensah.  06/19/2020- EGD normal with biopsies of the small intestine, duodenum: Benign duodenal mucosa with submucosa.  Histologic changes of celiac sprue not identified.  06/19/2020-colonoscopy.  Internal hemorrhoids otherwise normal.      ADULT ILLNESSES:  Patient Active Problem List   Diagnosis Code   • Anemia D64.9   • CML (chronic myelocytic leukemia) (CMS/HCC) C92.10   • Hep C w/o coma, chronic (CMS/HCC) B18.2   • GERD (gastroesophageal reflux disease) K21.9   • Adhesive capsulitis of knee, left M76.892   • Bilateral low back pain with right-sided sciatica M54.41   • Chronic midline low back pain with bilateral sciatica M54.41, M54.42, G89.29   • High risk medications (not anticoagulants) long-term use Z79.899   • Hx of lumbosacral spine surgery Z98.890   • Hyperlipidemia E78.5   • Lumbar facet arthropathy M47.816   • Myofascial pain syndrome M79.18   • Neck pain M54.2      SURGERIES:  Past Surgical History:   Procedure Laterality Date   • ANTERIOR CERVICAL FUSION     • CARPAL TUNNEL RELEASE Right    • COLONOSCOPY  03/31/2016    The entire examined colon is normal on direct and retroflexion views; Repeat 10 years    • COLONOSCOPY N/A 6/13/2019    Non-bleeding internal hemorrhoids; The examination was otherwise normal; No specimens collected; Repeat 10 years   • COLONOSCOPY  03/03/2008    Dr. Geiger-Normal; Repeat 5 years   • ENDOSCOPY N/A 6/19/2019    Normal esophagus; Normal stomach; Normal examined duodenum-biopsied   • GANGLION CYST EXCISION     • KNEE ARTHROSCOPY Left    • KNEE ARTHROSCOPY Right    • LIVER BIOPSY  11/03/2004    Mild portal inflammation; No hepatic steatosis, portal fibrosis, cirrhosis, or metastatic malignancy; mild increase in stainable iron (3+); Changes consistent with Hep C, Scheuer grade 2, stage 0   • NECK SURGERY      To replace neck plate after a fall    • ORIF HUMERUS FRACTURE Left    • OTHER SURGICAL HISTORY      arm surgery with hardware   • REPLACEMENT TOTAL KNEE Left    • SPINAL FUSION       HEALTH MAINTENANCE ITEMS:  Health Maintenance Due   Topic Date Due   • Pneumococcal Vaccine 0-64 (1 of 4 - PCV13) Never done   • Hepatitis B (1 of 3 - Risk 3-dose series) Never done   • ZOSTER VACCINE (1 of 2) Never done   • TDAP/TD VACCINES (2 - Tdap) 06/03/2007   • ANNUAL WELLNESS VISIT  Never done   • LIPID PANEL  07/08/2020   • COVID-19 Vaccine (3 - Moderna risk 3-dose series) 05/17/2021       <no information>  Last Completed Colonoscopy          COLORECTAL CANCER SCREENING (COLONOSCOPY - Every 10 Years) Next due on 6/13/2029 06/13/2019  COLONOSCOPY    06/13/2019  Surgical Procedure: COLONOSCOPY    03/31/2016  SCANNED - COLONOSCOPY    03/03/2008  SCANNED - COLONOSCOPY                There is no immunization history on file for this patient.  Last Completed Mammogram     This patient has no relevant Health Maintenance data.            FAMILY  "HISTORY:  Family History   Problem Relation Age of Onset   • Prostate cancer Father    • No Known Problems Mother    • No Known Problems Maternal Grandmother    • No Known Problems Maternal Grandfather    • No Known Problems Paternal Grandmother    • No Known Problems Paternal Grandfather    • Colon cancer Neg Hx    • Colon polyps Neg Hx      SOCIAL HISTORY:  Social History     Socioeconomic History   • Marital status:      Spouse name: Not on file   • Number of children: Not on file   • Years of education: Not on file   • Highest education level: Not on file   Tobacco Use   • Smoking status: Former Smoker     Quit date: 1975     Years since quittin.3   • Smokeless tobacco: Never Used   Substance and Sexual Activity   • Alcohol use: Yes     Comment: occ   • Drug use: No   • Sexual activity: Defer       REVIEW OF SYSTEMS:  Gleevec tolerance:   No subjective problems with bouts of loose stools. Denies headaches. \"No.\"    Constitutional:   The patient's appetite is fair, \"I eat.\" His energy is fair, \"not as great.\" Remains active, \"my left shoulder, knee and back all bother.\" Is no longer seen by Pain Management.  Says he is not on any pain meds.  He has no fevers or chills. He has bouts of non-drenching night sweats. He has lost 7 pounds (had gained 10 pounds at his prior visit) in the interval since his last visit. His sleep habits have been fairly good. Received # 2 COVID vaccine, 2021  Ear/Nose/Throat/Mouth:   He has perennial sinus symptoms. He has recurrent mouth sores previously modulated by Miracle Mouthwash. \"I got bad teeth.\" He has no ear pains, sore throat, or nosebleeds. He has no headaches.  Ocular:   He denies eye pain, significant change in visual acuity, double vision, or blurry vision.  Respiratory:   He has no significant shortness of breathing, cough, phlegm production, or unexplained chest wall pain.  Cardiovascular:   He has no exertional chest pain. He has no claudication. He " "has no palpitations or symptomatic orthostasis.   Gastrointestinal:   He has not had any bouts of postprandial pyrosis on omeprazole. He has no dysphagia, nausea, vomiting, no bloating, abdominal pain or cramping. He has no jaundice. He has infrequent loose stools. Has no dark (off oral iron) discoloration of the stool. Was tolerating oral iron bid.  He has had no rectal bleeding. He has not been constipated since stopping Percocet. Had repeat colonoscopy (above), 03/31/2016. Normal.   Genitourinary:   He has no urinary burning, frequency, dribbling, or discoloration. He has no difficulty controlling his bladder. He has no need to urinate frequently through the night.  Musculoskeletal:   He has chronic left knee, neck, left shoulder and back pains. He stopped all pain meds (Percocet). His left knee still bothers him. Dr. Rizo follows. He has lingering left shoulder pains after ORIF last 04/05/2019.   Endocrine:   He has no problems with excess thirst, excessive urination, vasomotor instability, or unexplained fatigue.  Heme/Lymphatic:   He has no unexplained bleeding, bruising, petechial rashes, or swollen glands.  Skin:   He has not had any recurrent furuncles. He has no itching.  Neuro:   The right hand numbness and weakness seems to have resolved (had redo cervical fusion, 09/2012). He has no loss of consciousness, seizures, fainting spells, or dizziness. He has no weakness of his face, arms, or legs. He has no difficulty with speech. He has no tremors. He has intermittent restless legs. Has been on Neurontin to good effect.  Psych:   He seems generally satisfied with life. He denies depression or anxiety.        VITAL SIGNS: /82   Pulse 71   Temp 97.3 °F (36.3 °C)   Resp 16   Ht 165.1 cm (65\")   Wt 67.6 kg (149 lb)   SpO2 97%   BMI 24.79 kg/m² Body surface area is 1.75 meters squared.  Pain Score    09/29/21 1523   PainSc: 0-No pain     I have reexamined the patient and the results are consistent " with the previously documented exam. Klever Ferreira MD     PHYSICAL EXAMINATION:   General:   He is an extremely-pleasant, slender, leaner, modestly kept male in no distress. He is ambulatory. ECOG PS = 0  Head/Neck:   The patient is anicteric. Wearing a surgical mask.  Poor dentition. The trachea is midline. The neck is supple without evidence of jugular venous distention or cervical adenopathy.   Eyes:   The extraocular movements are full. There is no scleral jaundice or erythema.  Chest:  The respiratory efforts are normal and unhindered. The chest is clear to auscultation. There are no wheezes, rhonchi, rales, or asymmetry of breath sounds.  Cardiovascular:   The patient has a regular cardiac rate and rhythm without murmurs, rubs, or gallops.  Abdomen:   The belly is soft and slightly globose. There is no rebound or guarding. There is no organomegaly, mass-effect, or tenderness.  Extremities:   There is no evidence of cyanosis, clubbing, or edema.   Rheumatologic:   There is no overt evidence of rheumatoid deformities of the hands. There is no sausaging of the fingers. There is no sign of active synovitis. The gait is independent and no longer antalgic (prior left knee replacement).  Cutaneous:   There are no disseminated lesions, purpura, or petechiae.   Lymphatics:   There is no evidence of adenopathy in the cervical, supraclavicular, axillary, inguinal, or femoral areas. There is no splenomegaly.  Neurologic:   The patient is alert, oriented, cooperative, and pleasant. He is appropriately conversant. He is fully ambulatory and able to get up onto the exam table without assistance. There is no overt dysfunction of the motor, sensory, or cerebellar systems.  Psych:   Mood and affect are appropriate for circumstance. Eye contact is appropriate.        LABS    Lab Results - Last 18 Months   Lab Units 09/22/21  1459 03/24/21  0946 01/13/21  1352 07/01/20  1312   HEMOGLOBIN g/dL 14.0 15.7 14.3 15.1   HEMATOCRIT  % 42.9 46.0 44.4 45.9   MCV fL 94.9 89.1 93.1 90.7   WBC 10*3/mm3 5.26 6.61 6.29 6.75   RDW % 13.7 13.8 13.5 14.7   MPV fL 10.4 9.5 9.8 9.7   PLATELETS 10*3/mm3 196 238 190 232   IMM GRAN % % 0.4 0.3 0.3 0.1   NEUTROS ABS 10*3/mm3 2.44 3.87 3.46 3.78   LYMPHS ABS 10*3/mm3 1.80 1.41 1.65 1.69   MONOS ABS 10*3/mm3 0.62 0.71 0.60 0.58   EOS ABS 10*3/mm3 0.33 0.53* 0.51* 0.60*   BASOS ABS 10*3/mm3 0.05 0.07 0.05 0.09   IMMATURE GRANS (ABS) 10*3/mm3 0.02 0.02 0.02 0.01   NRBC /100 WBC 0.0 0.0 0.0 0.0       Lab Results - Last 18 Months   Lab Units 09/22/21  1459 03/24/21  0946 01/13/21  1352 07/01/20  1312   GLUCOSE mg/dL 123* 114* 97 108*   SODIUM mmol/L 139 136 141 142   POTASSIUM mmol/L 3.9 4.0 4.1 4.0   CO2 mmol/L 29.0 29.0 30.0* 27.0   CHLORIDE mmol/L 102 99 103 102   ANION GAP mmol/L 8.0 8.0 8.0 13.0   CREATININE mg/dL 1.00 1.14 1.08 0.95   BUN mg/dL 9 13 13 8   BUN / CREAT RATIO  9.0 11.4 12.0 8.4   CALCIUM mg/dL 9.1 8.8 9.1 9.2   EGFR IF NONAFRICN AM mL/min/1.73 75 65 69 80   ALK PHOS U/L 64 78 67 68   TOTAL PROTEIN g/dL 7.1 7.4 7.0 6.6   ALT (SGPT) U/L 63* 30 21 24   AST (SGOT) U/L 75* 40 28 30   BILIRUBIN mg/dL 0.7 1.2 0.6 1.0   ALBUMIN g/dL 4.60 4.30 4.20 4.30   GLOBULIN gm/dL 2.5 3.1 2.8 2.3       Lab Results - Last 18 Months   Lab Units 09/22/21  1459 03/24/21  0946 01/13/21  1352   REFERENCE LAB REPORT  See Attached Report  See Attached Value See Attached Result  See Attached Result see attached report       Lab Results - Last 18 Months   Lab Units 09/22/21  1459 03/24/21  0946 01/13/21  1352 07/01/20  1312   IRON mcg/dL 86 147 110 88   TIBC mcg/dL 326 399 364 350   IRON SATURATION % 26 37 30 25   FERRITIN ng/mL 305.80 190.50 124.80 147.50   FOLATE ng/mL  --   --   --  16.00       ASSESSMENT:   1. Chronic myelogenous leukemia (CML):   Stage: Chronic phase.   Tumor burden: Marrow involvement. No hepatosplenomegaly.   Complications of tumor: None. No manifestations of hyperviscosity.   Tolerance to therapy:  Cytopenias and liver function test (LFT) abnormalities.   Status: Cytogenetic remission, with hematologic remission. No detectable disease (bone marrow biopsy, 03/30/2007 - no BCR-ABL fusion transcript was detected by real-time PCR) on current dose of Gleevec.   No BCR-ABL transcript detected (IS: less than 0.1% - Major Molecular Response), 09/06/2018.   NEGATIVE for the BCR-ABL1 e1a2 (p190), e13a2 (b2a2, p210) and e14a2 (b3a2, p210) fusion transcripts, 01/22/2019. The standardized baseline is 100% BCR-ABL1 (IS) and major molecular response (MMR) is equivalent to 0.1% BCRABL1 (IS)   NEGATIVE for the BCR-ABL1 e1a2 (p190), e13a2 (b2a2, p210) and e14a2 (b3a2, p210) fusion transcripts, 05/17/2019. The standardized baseline is 100% BCR-ABL1 (IS) and major molecular response (MMR) is equivalent to 0.1% BCRABL1 (IS)  01/13/2021-FISH for BCR/ABL 1 rearrangement-findings: Negative for BCR/ABL 1 rearrangement (percent positive nuclei-0).  09/22/2021-FISH ALL panel: Negative MYC rearrangement, BCR/ABL 1 rearrangement, KMT2A (MLL), trisomy 6, trisomy 21  09/22/2021-BCR/ABL 1 quantitative PCR analysis .  The BCR/ABL 1 translocation was not detected (below the sensitivity limit of the assay,<0.001%).    2. Hepatitis C with chronic elevation of liver function tests (LFTs).   Previous liver biopsy negative for cirrhosis or malignancy.   Colonoscopy 03/31/2016. The entire examined colon appeared normal on direct and retroflexion views.   Completed hepatitis C Rx (Harvoni) beginning 05/13/2016 (daily x8 weeks). Followed for gastroenterology (GI) by Dr. Rosas.   Recurrent transaminitis, 05/17/2019 06/06/2019- liver ultrasound.  Impression: Mildly coarsened liver echogenicity which can be seen with chronic liver disease.  06/19/2019-EGD normal.  Colonoscopy with internal hemorrhoids otherwise normal.  3. Normocytic anemia, Gleevec associated.   --Stable, Hgb 14 on 09/22/2021 (prior range: Hgb 12.6 - 15.7).   4. Leukopenia without  "neutropenia. Gleevec associated. Normal counts since 12/14/2016.   5. Acute kidney injury. Improved, GFR 75 mL/min, 09/22/2021 (prior range: 59.6 -72 mL/min). Followed by nephrology.   6. Gastroesophageal reflux disease (GERD), well-modulated on proton pump inhibitor (PPI).   7. Degenerative joint disease (DJD), knees and back especially. Pain management (Ortho - Dr. Rubio) follows.   8. Alcohol (ETOH) use. Says he has not imbibed at all since 07/2009, \"not since.\"   9. C5-C6 and C6-C7 abnormalities with disk rupture with brachial neuritis/radiculitis. Had prior anterior cervical fusion, allograft, and [place] plating, 06/02/2010. Dr. Caceres. Underwent redo surgery, 09/2012 after a fall and hyperextension injury.   10. Restless legs, well-modulated on Neurontin as needed.   11. Seen by [facility] Pain Management (Dr. Rubio), 08/22/2018. Impression: Myofascial pain syndrome/radiculopathy of lumbar region. Given Rx for Percocet. Assumed opioid prescribing.   12. Poor dentition  13. A bit self directed.     PLAN:   1.  Re: Apprise of labs from 09/22/2021 with resolution of anemia, normal WBC and otherwise normal CBC, and other labs including the stable GFR, elevated AST/ALT, slightly depressed phosphorus, otherwise normal CMP, repleted ferritin, repleted iron, repleted Fe sat (> 20%).    2.  Apprised of the negative QT PCR for BCR/ABL translocation (below sensitivity limit of assay,<0.001%), 09/22/2021    3. Continue Gleevec 300 mg daily ([facility] mails to him).   4.  Previously apprised of liver ultrasound, 06/06/2019 showing coarse liver and chronic liver disease, and EGD/colonoscopy, 06/19/2019 (above) with normal EGD and colonoscopy showing internal hemorrhoids otherwise normal.    5.  Stay off Ferrous sulfate (ferritin > 100)         6.  Rx: K-Phos p.o. 3 times daily x3 days                    Magic mouthwash 4-5x/day x 30 day supply          7. Continue other currently identified medications. He gets his " Gleevec mailed in - [facility].   8. Continue ongoing management per primary care physician and other specialists.     9. Return to the [place] office with pre-office RT-PCR for BCR-ABL, serum iron, Fe saturation, ferritin, CMP, phosphorus, and CBC with differential in 24 weeks.    MEDICAL DECISION MAKING: Moderate Complexity   AMOUNT OF DATA: Moderate     I spent 34 total minutes, face-to-face, caring for Cayetano today.  Greater than 50% of this time involved counseling and/or coordination of care as documented within this note regarding the patient's illness(es), pros and cons of various treatment options, instructions and/or risk reduction.    cc:  NEHAL Wallace

## 2021-09-28 LAB
REF LAB TEST METHOD: NORMAL
REF LAB TEST METHOD: NORMAL

## 2021-09-29 ENCOUNTER — OFFICE VISIT (OUTPATIENT)
Dept: ONCOLOGY | Facility: CLINIC | Age: 66
End: 2021-09-29

## 2021-09-29 VITALS
BODY MASS INDEX: 24.83 KG/M2 | TEMPERATURE: 97.3 F | WEIGHT: 149 LBS | HEART RATE: 71 BPM | HEIGHT: 65 IN | SYSTOLIC BLOOD PRESSURE: 136 MMHG | DIASTOLIC BLOOD PRESSURE: 82 MMHG | RESPIRATION RATE: 16 BRPM | OXYGEN SATURATION: 97 %

## 2021-09-29 DIAGNOSIS — C92.10 CML (CHRONIC MYELOCYTIC LEUKEMIA) (HCC): Primary | ICD-10-CM

## 2021-09-29 PROCEDURE — 99214 OFFICE O/P EST MOD 30 MIN: CPT | Performed by: INTERNAL MEDICINE

## 2021-09-29 RX ORDER — POTASSIUM PHOSPHATE, MONOBASIC 500 MG/1
500 TABLET, SOLUBLE ORAL 3 TIMES DAILY
Qty: 9 TABLET | Refills: 0 | Status: SHIPPED | OUTPATIENT
Start: 2021-09-29 | End: 2021-10-02

## 2021-10-20 ENCOUNTER — PATIENT OUTREACH (OUTPATIENT)
Dept: ONCOLOGY | Facility: HOSPITAL | Age: 66
End: 2021-10-20

## 2021-10-20 NOTE — OUTREACH NOTE
[facility]    Oral Oncology Patient Outreach      Prescription Details  Consulting Provider:   Klever Ferreira MD ([facility])   Medication and Regimen:  Gleevec 300 mg PO daily       An attempt was made by the Oral Oncology Clinic to contact this patient for a follow-up on their chemotherapy medication. The Oral Oncology Clinic can be reached at [phone].   Attempted to contact patient on 10/20/21, will try again tomorrow (10/21/21) before deferring out to next month.       Kirsten Campos, PharmD  10/20/21  17:57 CDT

## 2021-10-22 ENCOUNTER — SPECIALTY PHARMACY (OUTPATIENT)
Dept: ONCOLOGY | Facility: HOSPITAL | Age: 66
End: 2021-10-22

## 2021-10-22 NOTE — PROGRESS NOTES
[facility]    Oral Oncology Patient Outreach      Prescription Details  Consulting Provider:   Klever Ferreira MD ([facility]   Medication and Regimen:  Gleevec 300 mg PO daily       A 2nd attempt was made by the Oral Oncology Clinic to contact this patient for a follow-up on their chemotherapy medication. The Oral Oncology Clinic can be reached at [phone].  Patient will be deferred to next month.        Gerardo Agrawal, PharmD  10/22/21  16:10 CDT

## 2021-11-19 ENCOUNTER — SPECIALTY PHARMACY (OUTPATIENT)
Dept: ONCOLOGY | Facility: HOSPITAL | Age: 66
End: 2021-11-19

## 2021-11-19 NOTE — PROGRESS NOTES
[facility]    Oral Oncology Patient Follow-Up      Consult Details  Consulting Provider:   Klever Ferreira MD ([facility] [place] [facility])   Medication and Regimen:  Imatinib [Gleevec] 300 mg po daily     Prescription Review  Dosing & Interactions:   Reviewed, appropriate and no significant interaction noted at this time..   Current Specialty Pharmacy [facility]     Intervention(s):                  Spoke with Mr. Curry for routine follow up of his Gleevec therapy.  He reports doing well on his current dose, no side effects to note.  No questions or concerns at this time.     Summary:    Continue to follow.     Nohemi Subramanian, PharmD  11/19/2021 16:50 CST     venodynes

## 2021-11-22 DIAGNOSIS — C92.10 CML (CHRONIC MYELOCYTIC LEUKEMIA) (HCC): ICD-10-CM

## 2021-11-23 RX ORDER — IMATINIB MESYLATE 100 MG/1
TABLET, FILM COATED ORAL
Qty: 90 TABLET | Refills: 11 | Status: SHIPPED | OUTPATIENT
Start: 2021-11-23

## 2021-12-03 ENCOUNTER — SPECIALTY PHARMACY (OUTPATIENT)
Dept: ONCOLOGY | Facility: HOSPITAL | Age: 66
End: 2021-12-03

## 2021-12-06 ENCOUNTER — SPECIALTY PHARMACY (OUTPATIENT)
Dept: ONCOLOGY | Facility: HOSPITAL | Age: 66
End: 2021-12-06

## 2021-12-17 ENCOUNTER — SPECIALTY PHARMACY (OUTPATIENT)
Dept: ONCOLOGY | Facility: HOSPITAL | Age: 66
End: 2021-12-17

## 2022-01-03 ENCOUNTER — PATIENT OUTREACH (OUTPATIENT)
Dept: ONCOLOGY | Facility: HOSPITAL | Age: 67
End: 2022-01-03

## 2022-01-03 NOTE — OUTREACH NOTE
[facility]    Oral Oncology Patient Outreach      Prescription Details  Consulting Provider:   Klever Ferreira MD ([facility] [place] [facility])   Medication and Regimen:  Imatinib [Gleevec] 300 mg po daily       An attempt was made by the Oral Oncology Clinic to contact this patient for a follow-up on their chemotherapy medication. The Oral Oncology Clinic can be reached at [phone].     Attempted to contact patient on 1/3/22, will try again tomorrow (1/4/22) before deferring out to next month.     Kirsten Campos, PharmD  01/03/22  12:45 CST

## 2022-01-04 ENCOUNTER — PATIENT OUTREACH (OUTPATIENT)
Dept: ONCOLOGY | Facility: HOSPITAL | Age: 67
End: 2022-01-04

## 2022-01-04 NOTE — OUTREACH NOTE
[facility]    Oral Oncology Patient Outreach      Prescription Details  Consulting Provider:   Klever Ferreira MD ([facility] [place] [facility])   Medication and Regimen:  Imatinib [Gleevec] 300 mg PO daily       An attempt was made by the Oral Oncology Clinic to contact this patient for a follow-up on their chemotherapy medication. The Oral Oncology Clinic can be reached at [phone].     Attempted to contact patient on 1/3/22 and 1/4/22, will defer patient out to 2/9/22 for next follow-up.       Kirsten Campos, PharmD  01/04/22  16:59 CST

## 2022-02-07 ENCOUNTER — OFFICE VISIT (OUTPATIENT)
Dept: GASTROENTEROLOGY | Facility: CLINIC | Age: 67
End: 2022-02-07

## 2022-02-07 ENCOUNTER — OFFICE VISIT (OUTPATIENT)
Dept: NEUROSURGERY | Facility: CLINIC | Age: 67
End: 2022-02-07

## 2022-02-07 ENCOUNTER — HOSPITAL ENCOUNTER (OUTPATIENT)
Dept: GENERAL RADIOLOGY | Facility: HOSPITAL | Age: 67
Discharge: HOME | End: 2022-02-07

## 2022-02-07 ENCOUNTER — LAB (OUTPATIENT)
Dept: LAB | Facility: HOSPITAL | Age: 67
End: 2022-02-07

## 2022-02-07 VITALS
OXYGEN SATURATION: 99 % | BODY MASS INDEX: 25.49 KG/M2 | SYSTOLIC BLOOD PRESSURE: 130 MMHG | HEART RATE: 70 BPM | TEMPERATURE: 97.3 F | HEIGHT: 65 IN | WEIGHT: 153 LBS | DIASTOLIC BLOOD PRESSURE: 80 MMHG

## 2022-02-07 VITALS
BODY MASS INDEX: 25.52 KG/M2 | SYSTOLIC BLOOD PRESSURE: 140 MMHG | DIASTOLIC BLOOD PRESSURE: 82 MMHG | HEIGHT: 65 IN | WEIGHT: 153.2 LBS

## 2022-02-07 DIAGNOSIS — M54.12 CERVICAL RADICULOPATHY: ICD-10-CM

## 2022-02-07 DIAGNOSIS — R74.8 ELEVATED LIVER ENZYMES: ICD-10-CM

## 2022-02-07 DIAGNOSIS — E66.3 OVERWEIGHT WITH BODY MASS INDEX (BMI) OF 25 TO 25.9 IN ADULT: ICD-10-CM

## 2022-02-07 DIAGNOSIS — F10.11 HISTORY OF ALCOHOL ABUSE: ICD-10-CM

## 2022-02-07 DIAGNOSIS — Z87.891 FORMER SMOKER: ICD-10-CM

## 2022-02-07 DIAGNOSIS — B18.2 HEP C W/O COMA, CHRONIC: ICD-10-CM

## 2022-02-07 DIAGNOSIS — M54.2 CERVICALGIA: ICD-10-CM

## 2022-02-07 DIAGNOSIS — B18.2 HEP C W/O COMA, CHRONIC: Primary | ICD-10-CM

## 2022-02-07 DIAGNOSIS — M54.2 CERVICALGIA: Primary | ICD-10-CM

## 2022-02-07 LAB
HAV IGM SERPL QL IA: ABNORMAL
HBV CORE IGM SERPL QL IA: ABNORMAL
HBV SURFACE AG SERPL QL IA: ABNORMAL
HCV AB SER DONR QL: REACTIVE

## 2022-02-07 PROCEDURE — 36415 COLL VENOUS BLD VENIPUNCTURE: CPT

## 2022-02-07 PROCEDURE — 99213 OFFICE O/P EST LOW 20 MIN: CPT | Performed by: NURSE PRACTITIONER

## 2022-02-07 PROCEDURE — 80074 ACUTE HEPATITIS PANEL: CPT

## 2022-02-07 PROCEDURE — 72052 X-RAY EXAM NECK SPINE 6/>VWS: CPT

## 2022-02-07 PROCEDURE — 99214 OFFICE O/P EST MOD 30 MIN: CPT | Performed by: NURSE PRACTITIONER

## 2022-02-07 PROCEDURE — 87522 HEPATITIS C REVRS TRNSCRPJ: CPT

## 2022-02-07 NOTE — PROGRESS NOTES
"Chief Complaint:   Chief Complaint   Patient presents with   • Hepatitis C     Pt presents today for evaluation for hep C-pt was treated with 8 weeks of Harvoni in 2016 but never had any follow up testing done-states he was told by our office at his last appt in 2019 that \"he had a different type\"          Patient ID: Cayetano Curry is a 66 y.o. male     History of Present Illness: This is a very pleasant 66-year-old male who is here to discuss treatment options for hepatitis C.    The patient was seen in our office in 2019 for hepatitis C.  Lab work-up and ultrasound of liver were ordered however the patient did not follow-up with this and never received treatment.  Last labs on 9/22/2021 ALT 63 AST 75.  The patient told me on office visit 6/7/2019 that he had been treated with Harvoni for hepatitis C in the past.  Records noted that he was initially seen in our office on 3/14/2016 and diagnosed in the 1990s.  Denies any history of IV drug use.  He denies any history of tattoos.  No blood transfusions.  The patient reported that he drank 1 case of beer a day for approximately 30 years but had stopped drinking in 2018.  The patient states \"I drink occasionally now mostly on the holidays.\"  Patient did undergo a liver biopsy performed 11/2014 that revealed mild portal hypertension grade 2 stage 0.  On 6/30/2016 seen in our office and treated with Harvoni once per day for 8 weeks.  The patient did not follow-up after treatment.  Hep C quant was ordered and HCV was not detected.    Past Medical History:   Diagnosis Date   • Chondromalacia    • Chronic myeloid leukemia (HCC)     CML   • DJD (degenerative joint disease)    • GERD (gastroesophageal reflux disease)    • Hep C w/o coma, chronic (HCC)    • Leukopenia    • Lichen planus    • Normocytic normochromic anemia    • Restless leg syndrome    • Seasonal allergic rhinitis        Past Surgical History:   Procedure Laterality Date   • ANTERIOR CERVICAL FUSION     • " CARPAL TUNNEL RELEASE Right    • COLONOSCOPY  2016    The entire examined colon is normal on direct and retroflexion views; Repeat 10 years    • COLONOSCOPY N/A 2019    Non-bleeding internal hemorrhoids; The examination was otherwise normal; No specimens collected; Repeat 10 years   • COLONOSCOPY  2008    Dr. Geiger-Normal; Repeat 5 years   • ENDOSCOPY N/A 2019    Normal esophagus; Normal stomach; Normal examined duodenum-biopsied   • GANGLION CYST EXCISION     • KNEE ARTHROSCOPY Left    • KNEE ARTHROSCOPY Right    • LIVER BIOPSY  2004    Mild portal inflammation; No hepatic steatosis, portal fibrosis, cirrhosis, or metastatic malignancy; mild increase in stainable iron (3+); Changes consistent with Hep C, Scheuer grade 2, stage 0   • NECK SURGERY      To replace neck plate after a fall    • ORIF HUMERUS FRACTURE Left    • OTHER SURGICAL HISTORY      arm surgery with hardware   • REPLACEMENT TOTAL KNEE Left    • SPINAL FUSION           Current Outpatient Medications:   •  citalopram (CeleXA) 40 MG tablet, Take 40 mg by mouth Daily., Disp: , Rfl:   •  fluticasone (FLONASE) 50 MCG/ACT nasal spray, 2 sprays into the nostril(s) as directed by provider Daily., Disp: , Rfl:   •  imatinib (GLEEVEC) 100 MG chemo tablet, TAKE 3 TABLETS (300MG) BY MOUTH ONCE DAILY AT THE SAME TIME WITH FOOD AND A LARGE GLASS OF WATER. AVOID GRAPEFRUIT PRODUCTS., Disp: 90 tablet, Rfl: 11  •  Miracle mouthwash oral suspension, Swish and spit 10 mL Every 4 (Four) Hours As Needed for Mucositis., Disp: 300 mL, Rfl: 0  •  omeprazole (priLOSEC) 40 MG capsule, Take 40 mg by mouth Daily., Disp: , Rfl:     No Known Allergies    Social History     Socioeconomic History   • Marital status:    Tobacco Use   • Smoking status: Former Smoker     Quit date: 1975     Years since quittin.6   • Smokeless tobacco: Never Used   Vaping Use   • Vaping Use: Never used   Substance and Sexual Activity   • Alcohol use: Yes      "Comment: Occasionally    • Drug use: No   • Sexual activity: Defer       Family History   Problem Relation Age of Onset   • Prostate cancer Father    • No Known Problems Mother    • No Known Problems Maternal Grandmother    • No Known Problems Maternal Grandfather    • No Known Problems Paternal Grandmother    • No Known Problems Paternal Grandfather    • Colon cancer Neg Hx    • Colon polyps Neg Hx    • Esophageal cancer Neg Hx    • Liver cancer Neg Hx    • Liver disease Neg Hx    • Rectal cancer Neg Hx    • Stomach cancer Neg Hx        Vitals:    02/07/22 1029   BP: 130/80   BP Location: Left arm   Patient Position: Sitting   Cuff Size: Adult   Pulse: 70   Temp: 97.3 °F (36.3 °C)   TempSrc: Infrared   SpO2: 99%   Weight: 69.4 kg (153 lb)   Height: 165.1 cm (65\")       Review of Systems:    General:    Present -feeling well   Skin:    Not Present-Rash   HEENT:     Not Present-Acute visual changes or Acute hearing changes   Neck :    Not Present- swollen glands   Genitourinary:      Not Present- burning, frequency, urgency hematuria, dysuria,   Cardiovascular:   Not Present-chest pain, palpitations, or pressure   Respiratory:   Not Present- shortness of breath or cough   Gastrointestinal:  Musculoskeletal:  Neurological:  Psychiatric:   Present as mentioned in the HP    Not Present. Recent gait disturbances.    Not Present-Seizures and weakness in extremities.    Not Present- Anxiety or Depression.       Physical Exam:    General Appearance:    Alert, cooperative, in no acute distress   Psych:    Mood appropriate    Eyes:          conjunctivae and sclerae normal, no   icterus, no pallor   ENMT:    Ears appear intact with no abnormalities noted oral mucosa moist   Neck:   No adenopathy, supple, trachea midline, no thyromegaly, no   carotid bruit, no JVD    Cardiovascular:    Regular rhythm and normal rate, normal S1 and S2, no            murmur, no gallop, no rub, no click   Gastrointestinal:     Inspection normal.  " Normal bowel sounds, no masses, no organomegaly, soft round non-tender, non-distended, no guarding, no rebound or tenderness. No hepatosplenomegaly.   Skin:   No bleeding, bruising or rash   Neurologic:   nonfocal       Lab Results - Last 18 Months   Lab Units 09/22/21  1459 03/24/21  0946 01/13/21  1352   GLUCOSE mg/dL 123* 114* 97   BUN mg/dL 9 13 13   CREATININE mg/dL 1.00 1.14 1.08   SODIUM mmol/L 139 136 141   POTASSIUM mmol/L 3.9 4.0 4.1   CHLORIDE mmol/L 102 99 103   CO2 mmol/L 29.0 29.0 30.0*   TOTAL PROTEIN g/dL 7.1 7.4 7.0   ALBUMIN g/dL 4.60 4.30 4.20   ALT (SGPT) U/L 63* 30 21   AST (SGOT) U/L 75* 40 28   ALK PHOS U/L 64 78 67   BILIRUBIN mg/dL 0.7 1.2 0.6   GLOBULIN gm/dL 2.5 3.1 2.8       Lab Results - Last 18 Months   Lab Units 09/22/21  1459 03/24/21  0946 01/13/21  1352   HEMOGLOBIN g/dL 14.0 15.7 14.3   HEMATOCRIT % 42.9 46.0 44.4   MCV fL 94.9 89.1 93.1   WBC 10*3/mm3 5.26 6.61 6.29   RDW % 13.7 13.8 13.5   MPV fL 10.4 9.5 9.8   PLATELETS 10*3/mm3 196 238 190       Lab Results - Last 18 Months   Lab Units 09/22/21  1459 03/24/21  0946 01/13/21  1352   IRON mcg/dL 86 147 110   TIBC mcg/dL 326 399 364   IRON SATURATION % 26 37 30   FERRITIN ng/mL 305.80 190.50 124.80        Lab Results - Last 18 Months   Lab Units 09/22/21  1459   FERRITIN ng/mL 305.80           Assessment and Plan:  Assessment/Plan   Diagnoses and all orders for this visit:    1. Hep C w/o coma, chronic (HCC) (Primary)  -     HCV RNA By PCR, Qn Rfx Cassie; Future  -     Hepatitis Panel, Acute; Future    2. Elevated liver enzymes  -     HCV RNA By PCR, Qn Rfx Cassie; Future  -     Hepatitis Panel, Acute; Future    3. History of alcohol abuse      Labs as noted above when all are resulted will contact patient to let him know if hepatitis C is indeed cleared from being treated with Harvoni in the past.     There are no Patient Instructions on file for this visit.    Next follow-up appointment        EMR Dragon/Transcription disclaimer:   Much of this encounter note is an electronic transcription/translation of spoken language to printed text. The electronic translation of spoken language may permit erroneous, or at times, nonsensical words or phrases to be inadvertently transcribed; although I have reviewed the note for such errors, some may still exist.

## 2022-02-07 NOTE — PATIENT INSTRUCTIONS
"BMI for Adults  What is BMI?  Body mass index (BMI) is a number that is calculated from a person's weight and height. BMI can help estimate how much of a person's weight is composed of fat. BMI does not measure body fat directly. Rather, it is an alternative to procedures that directly measure body fat, which can be difficult and expensive.  BMI can help identify people who may be at higher risk for certain medical problems.  What are BMI measurements used for?  BMI is used as a screening tool to identify possible weight problems. It helps determine whether a person is obese, overweight, a healthy weight, or underweight.  BMI is useful for:  · Identifying a weight problem that may be related to a medical condition or may increase the risk for medical problems.  · Promoting changes, such as changes in diet and exercise, to help reach a healthy weight. BMI screening can be repeated to see if these changes are working.  How is BMI calculated?  BMI involves measuring your weight in relation to your height. Both height and weight are measured, and the BMI is calculated from those numbers. This can be done either in English (U[place] or metric measurements. Note that charts and online BMI calculators are available to help you find your BMI quickly and easily without having to do these calculations yourself.  To calculate your BMI in English ([place] measurements:    1. Measure your weight in pounds (lb).  2. Multiply the number of pounds by 703.  ? For example, for a person who weighs 180 lb, multiply that number by 703, which equals 126,540.  3. Measure your height in inches. Then multiply that number by itself to get a measurement called \"inches squared.\"  ? For example, for a person who is 70 inches tall, the \"inches squared\" measurement is 70 inches x 70 inches, which equals 4,900 inches squared.  4. Divide the total from step 2 (number of lb x 703) by the total from step 3 (inches squared): 126,540 ÷ 4,900 = 25.8. This is " "your BMI.    To calculate your BMI in metric measurements:  1. Measure your weight in kilograms (kg).  2. Measure your height in meters (m). Then multiply that number by itself to get a measurement called \"meters squared.\"  ? For example, for a person who is 1.75 m tall, the \"meters squared\" measurement is 1.75 m x 1.75 m, which is equal to 3.1 meters squared.  3. Divide the number of kilograms (your weight) by the meters squared number. In this example: 70 ÷ 3.1 = 22.6. This is your BMI.  What do the results mean?  BMI charts are used to identify whether you are underweight, normal weight, overweight, or obese. The following guidelines will be used:  · Underweight: BMI less than 18.5.  · Normal weight: BMI between 18.5 and 24.9.  · Overweight: BMI between 25 and 29.9.  · Obese: BMI of 30 or above.  Keep these notes in mind:  · Weight includes both fat and muscle, so someone with a muscular build, such as an athlete, may have a BMI that is higher than 24.9. In cases like these, BMI is not an accurate measure of body fat.  · To determine if excess body fat is the cause of a BMI of 25 or higher, further assessments may need to be done by a health care provider.  · BMI is usually interpreted in the same way for men and women.  Where to find more information  For more information about BMI, including tools to quickly calculate your BMI, go to these websites:  · Centers for Disease Control and Prevention: www.cdc.gov  · American Heart Association: www.heart.org  · National Heart, Lung, and Blood [place]: www.nhlbi.nih.gov  Summary  · Body mass index (BMI) is a number that is calculated from a person's weight and height.  · BMI may help estimate how much of a person's weight is composed of fat. BMI can help identify those who may be at higher risk for certain medical problems.  · BMI can be measured using English measurements or metric measurements.  · BMI charts are used to identify whether you are underweight, normal " "weight, overweight, or obese.  This information is not intended to replace advice given to you by your health care provider. Make sure you discuss any questions you have with your health care provider.  Document Revised: 09/09/2020 Document Reviewed: 07/17/2020  [employer] Patient Education © 2021 [employer]      https://www.nhlbi.nih.gov/files/docs/public/heart/dash_brief.pdf\">   DASH Eating Plan  DASH stands for Dietary Approaches to Stop Hypertension. The DASH eating plan is a healthy eating plan that has been shown to:  · Reduce high blood pressure (hypertension).  · Reduce your risk for type 2 diabetes, heart disease, and stroke.  · Help with weight loss.  What are tips for following this plan?  Reading food labels  · Check food labels for the amount of salt (sodium) per serving. Choose foods with less than 5 percent of the Daily Value of sodium. Generally, foods with less than 300 milligrams (mg) of sodium per serving fit into this eating plan.  · To find whole grains, look for the word \"whole\" as the first word in the ingredient list.  Shopping  · Buy products labeled as \"low-sodium\" or \"no salt added.\"  · Buy fresh foods. Avoid canned foods and pre-made or frozen meals.  Cooking  · Avoid adding salt when cooking. Use salt-free seasonings or herbs instead of table salt or sea salt. Check with your health care provider or pharmacist before using salt substitutes.  · Do not arias foods. Cook foods using healthy methods such as baking, boiling, grilling, roasting, and broiling instead.  · Cook with heart-healthy oils, such as olive, canola, avocado, soybean, or sunflower oil.  Meal planning    · Eat a balanced diet that includes:  ? 4 or more servings of fruits and 4 or more servings of vegetables each day. Try to fill one-half of your plate with fruits and vegetables.  ? 6-8 servings of whole grains each day.  ? Less than 6 oz (170 g) of lean meat, poultry, or fish each day. A 3-oz (85-g) serving of meat is " about the same size as a deck of cards. One egg equals 1 oz (28 g).  ? 2-3 servings of low-fat dairy each day. One serving is 1 cup (237 mL).  ? 1 serving of nuts, seeds, or beans 5 times each week.  ? 2-3 servings of heart-healthy fats. Healthy fats called omega-3 fatty acids are found in foods such as walnuts, flaxseeds, fortified milks, and eggs. These fats are also found in cold-water fish, such as sardines, salmon, and mackerel.  · Limit how much you eat of:  ? Canned or prepackaged foods.  ? Food that is high in trans fat, such as some fried foods.  ? Food that is high in saturated fat, such as fatty meat.  ? Desserts and other sweets, sugary drinks, and other foods with added sugar.  ? Full-fat dairy products.  · Do not salt foods before eating.  · Do not eat more than 4 egg yolks a week.  · Try to eat at least 2 vegetarian meals a week.  · Eat more home-cooked food and less restaurant, buffet, and fast food.    Lifestyle  · When eating at a restaurant, ask that your food be prepared with less salt or no salt, if possible.  · If you drink alcohol:  ? Limit how much you use to:  § 0-1 drink a day for women who are not pregnant.  § 0-2 drinks a day for men.  ? Be aware of how much alcohol is in your drink. In the [place] one drink equals one 12 oz bottle of beer (355 mL), one 5 oz glass of wine (148 mL), or one 1½ oz glass of hard liquor (44 mL).  General information  · Avoid eating more than 2,300 mg of salt a day. If you have hypertension, you may need to reduce your sodium intake to 1,500 mg a day.  · Work with your health care provider to maintain a healthy body weight or to lose weight. Ask what an ideal weight is for you.  · Get at least 30 minutes of exercise that causes your heart to beat faster (aerobic exercise) most days of the week. Activities may include walking, swimming, or biking.  · Work with your health care provider or dietitian to adjust your eating plan to your individual calorie needs.  What  foods should I eat?  Fruits  All fresh, dried, or frozen fruit. Canned fruit in natural juice (without added sugar).  Vegetables  Fresh or frozen vegetables (raw, steamed, roasted, or grilled). Low-sodium or reduced-sodium tomato and vegetable juice. Low-sodium or reduced-sodium tomato sauce and tomato paste. Low-sodium or reduced-sodium canned vegetables.  Grains  Whole-grain or whole-wheat bread. Whole-grain or whole-wheat pasta. Brown rice. Oatmeal. Quinoa. Bulgur. Whole-grain and low-sodium cereals. Francesca bread. Low-fat, low-sodium crackers. Whole-wheat flour tortillas.  Meats and other proteins  Skinless chicken or turkey. Ground chicken or turkey. Pork with fat trimmed off. Fish and seafood. Egg whites. Dried beans, peas, or lentils. Unsalted nuts, nut butters, and seeds. Unsalted canned beans. Lean cuts of beef with fat trimmed off. Low-sodium, lean precooked or cured meat, such as sausages or meat loaves.  Dairy  Low-fat (1%) or fat-free (skim) milk. Reduced-fat, low-fat, or fat-free cheeses. Nonfat, low-sodium ricotta or cottage cheese. Low-fat or nonfat yogurt. Low-fat, low-sodium cheese.  Fats and oils  Soft margarine without trans fats. Vegetable oil. Reduced-fat, low-fat, or light mayonnaise and salad dressings (reduced-sodium). Canola, safflower, olive, avocado, soybean, and sunflower oils. Avocado.  Seasonings and condiments  Herbs. Spices. Seasoning mixes without salt.  Other foods  Unsalted popcorn and pretzels. Fat-free sweets.  The items listed above may not be a complete list of foods and beverages you can eat. Contact a dietitian for more information.  What foods should I avoid?  Fruits  Canned fruit in a light or heavy syrup. Fried fruit. Fruit in cream or butter sauce.  Vegetables  Creamed or fried vegetables. Vegetables in a cheese sauce. Regular canned vegetables (not low-sodium or reduced-sodium). Regular canned tomato sauce and paste (not low-sodium or reduced-sodium). Regular tomato and  vegetable juice (not low-sodium or reduced-sodium). Pickles. Olives.  Grains  Baked goods made with fat, such as croissants, muffins, or some breads. Dry pasta or rice meal packs.  Meats and other proteins  Fatty cuts of meat. Ribs. Fried meat. [place]. Bologna, salami, and other precooked or cured meats, such as sausages or meat loaves. Fat from the back of a pig (fatback). Bratwurst. Salted nuts and seeds. Canned beans with added salt. Canned or smoked fish. Whole eggs or egg yolks. Chicken or turkey with skin.  Dairy  Whole or 2% milk, cream, and half-and-half. Whole or full-fat cream cheese. Whole-fat or sweetened yogurt. Full-fat cheese. Nondairy creamers. Whipped toppings. Processed cheese and cheese spreads.  Fats and oils  Butter. Stick margarine. Lard. Shortening. Ghee. [place] fat. Tropical oils, such as coconut, palm kernel, or palm oil.  Seasonings and condiments  Onion salt, garlic salt, seasoned salt, table salt, and sea salt. Worcestershire sauce. Tartar sauce. Barbecue sauce. Teriyaki sauce. Soy sauce, including reduced-sodium. Steak sauce. Canned and packaged gravies. Fish sauce. Oyster sauce. Cocktail sauce. Store-bought horseradish. Ketchup. Mustard. Meat flavorings and tenderizers. Bouillon cubes. Hot sauces. Pre-made or packaged marinades. Pre-made or packaged taco seasonings. Relishes. Regular salad dressings.  Other foods  Salted popcorn and pretzels.  The items listed above may not be a complete list of foods and beverages you should avoid. Contact a dietitian for more information.  Where to find more information  · National Heart, Lung, and Blood [place]: www.nhlbi.nih.gov  · American Heart Association: www.heart.org  · Academy of Nutrition and Dietetics: www.eatright.org  · National Kidney Foundation: www.kidney.org  Summary  · The DASH eating plan is a healthy eating plan that has been shown to reduce high blood pressure (hypertension). It may also reduce your risk for type 2 diabetes, heart  disease, and stroke.  · When on the DASH eating plan, aim to eat more fresh fruits and vegetables, whole grains, lean proteins, low-fat dairy, and heart-healthy fats.  · With the DASH eating plan, you should limit salt (sodium) intake to 2,300 mg a day. If you have hypertension, you may need to reduce your sodium intake to 1,500 mg a day.  · Work with your health care provider or dietitian to adjust your eating plan to your individual calorie needs.  This information is not intended to replace advice given to you by your health care provider. Make sure you discuss any questions you have with your health care provider.  Document Revised: 11/20/2020 Document Reviewed: 11/20/2020  Elsevier Patient Education © 2021 [employer]

## 2022-02-07 NOTE — PROGRESS NOTES
Chief complaint:   Chief Complaint   Patient presents with   • Neck and back pain     Cayetano has been referred today for neck and back pain, with his neck pain being the worst.  He does have left arm numbness.  He did have CT scans of the lumbar and cervical at [facility] but did not bring the films with him today for review.  He has had no recent physical therapy. No pain management.  He does report a previous cervical and lumbar surgery and has done pain manaement in the past, he doesn't want to do pain management again because he doesn't like to take pain medication.        Subjective     HPI: This is a 66-year-old male gentleman who was referred to us by JOHNNY Wallace for neck pain and left arm numbness and tingling.  He is here to be evaluated today.  Patient says that he has had 2 previous neck surgeries.  He had a surgery by Dr. Caceres in 3 and did well from that surgery.  He had a fall in 2008 and had to be flown to Hospital and had another neck surgery.  He said he was doing okay but was going to pain management.  He said he did have injections and was taking pain medication up until a year ago when he finally got off of the pain medication.  He says about 6 months ago he started having worsening pain in his neck.  He says it is constant.  It is worse whenever he turns his neck to the left and better whenever he turns to the right.  He says that he can have numbness and tingling that radiates down his left arm to about his elbow when he turns his head to the left and this does resolve when he turns to the right.  Denies any bowel or bladder incontinence.  He has not done any recent physical therapy or chiropractic care or recent pain management.  He is left-hand dominant.  He is a retired .  Denies any tobacco or illicit drug use.  Does drink alcohol on occasion.  He is .  Rates his pain on a scale 0-10 at a 7.  The patient also does complain of some back pain but does want a focus on  his neck at this time    Review of Systems   Constitutional: Positive for fatigue.   HENT: Positive for mouth sores and sinus pressure.    Musculoskeletal: Positive for back pain, neck pain and neck stiffness.   All other systems reviewed and are negative.       Past Medical History:   Diagnosis Date   • Chondromalacia    • Chronic myeloid leukemia (HCC)     CML   • DJD (degenerative joint disease)    • GERD (gastroesophageal reflux disease)    • Hep C w/o coma, chronic (HCC)    • Leukopenia    • Lichen planus    • Normocytic normochromic anemia    • Restless leg syndrome    • Seasonal allergic rhinitis      Past Surgical History:   Procedure Laterality Date   • ANTERIOR CERVICAL FUSION     • CARPAL TUNNEL RELEASE Right    • COLONOSCOPY  03/31/2016    The entire examined colon is normal on direct and retroflexion views; Repeat 10 years    • COLONOSCOPY N/A 6/13/2019    Non-bleeding internal hemorrhoids; The examination was otherwise normal; No specimens collected; Repeat 10 years   • COLONOSCOPY  03/03/2008    Dr. Geiger-Normal; Repeat 5 years   • ENDOSCOPY N/A 6/19/2019    Normal esophagus; Normal stomach; Normal examined duodenum-biopsied   • GANGLION CYST EXCISION     • KNEE ARTHROSCOPY Left    • KNEE ARTHROSCOPY Right    • LIVER BIOPSY  11/03/2004    Mild portal inflammation; No hepatic steatosis, portal fibrosis, cirrhosis, or metastatic malignancy; mild increase in stainable iron (3+); Changes consistent with Hep C, Scheuer grade 2, stage 0   • NECK SURGERY      To replace neck plate after a fall    • ORIF HUMERUS FRACTURE Left    • OTHER SURGICAL HISTORY      arm surgery with hardware   • REPLACEMENT TOTAL KNEE Left    • SPINAL FUSION       Family History   Problem Relation Age of Onset   • Prostate cancer Father    • No Known Problems Mother    • No Known Problems Maternal Grandmother    • No Known Problems Maternal Grandfather    • No Known Problems Paternal Grandmother    • No Known Problems Paternal  "Grandfather    • Colon cancer Neg Hx    • Colon polyps Neg Hx      Social History     Tobacco Use   • Smoking status: Former Smoker     Quit date: 1975     Years since quittin.6   • Smokeless tobacco: Never Used   Substance Use Topics   • Alcohol use: Yes     Comment: occ   • Drug use: No     (Not in a hospital admission)    Allergies:  Patient has no known allergies.    Objective      Vital Signs  /82   Ht 165.1 cm (65\")   Wt 69.5 kg (153 lb 3.2 oz)   BMI 25.49 kg/m²     Physical Exam  Constitutional:       Appearance: Normal appearance. He is well-developed.   HENT:      Head: Normocephalic.   Eyes:      General: Lids are normal.      Extraocular Movements: EOM normal.      Conjunctiva/sclera: Conjunctivae normal.      Pupils: Pupils are equal, round, and reactive to light.   Cardiovascular:      Rate and Rhythm: Normal rate and regular rhythm.   Pulmonary:      Effort: Pulmonary effort is normal.      Breath sounds: Normal breath sounds.   Musculoskeletal:         General: Normal range of motion.      Cervical back: Normal range of motion.   Skin:     General: Skin is warm.   Neurological:      Mental Status: He is alert and oriented to person, place, and time.      GCS: GCS eye subscore is 4. GCS verbal subscore is 5. GCS motor subscore is 6.      Cranial Nerves: No cranial nerve deficit.      Sensory: No sensory deficit.      Gait: Gait is intact.      Deep Tendon Reflexes: Strength normal and reflexes are normal and symmetric. Reflexes normal.   Psychiatric:         Speech: Speech normal.         Behavior: Behavior normal.         Thought Content: Thought content normal.         Neurologic Exam     Mental Status   Oriented to person, place, and time.   Attention: normal. Concentration: normal.   Speech: speech is normal   Level of consciousness: alert  Normal comprehension.     Cranial Nerves     CN II   Visual fields full to confrontation.     CN III, IV, VI   Pupils are equal, round, and " reactive to light.  Extraocular motions are normal.     CN V   Facial sensation intact.     CN VII   Facial expression full, symmetric.     CN VIII   CN VIII normal.     CN IX, X   CN IX normal.   CN X normal.     CN XI   CN XI normal.     CN XII   CN XII normal.     Motor Exam   Muscle bulk: normal    Strength   Strength 5/5 throughout.     Sensory Exam   Light touch normal.     Gait, Coordination, and Reflexes     Gait  Gait: normal    Reflexes   Reflexes 2+ except as noted.       Imaging review: No imaging        Assessment/Plan: The patient says that he has had some CT scans done at [facility] but will get a copy of these and bring them to the next appointment.  I am going to send him for set of x-rays of the cervical spine to include flexion and extension  For first line conservative care of cervical pain, I would like to send Mr. Curry for a dedicated course of physician directed physical therapy consisting of 2-3 times a week for 4-6 weeks.  He will do this at [facility]    Return for reassessment with me after 6-8 weeks after physical therapy.     Should Mr. Curry not have any improvement from physical therapy, I think it would be prudent to send the patient for an MRI of the cervical spine to see if there is anything from a surgical standpoint that needs to be addressed.         I advised the patient to call and return sooner for new or worsening complaints of weakness, paresthesias, gait disturbances, or any additional concerns.  Treatment options discussed in detail with Cayetano and the patient voiced understanding.  Mr. Curry agrees with this plan of care.     Patient is a nonsmoker  The patient's Body mass index is 25.49 kg/m².. BMI is above normal parameters. Recommendations include: educational material and nutrition counseling    Diagnoses and all orders for this visit:    1. Cervicalgia (Primary)  -     XR Spine Cervical Complete With Obli Flex Ext;  Future  -     Ambulatory Referral to Physical Therapy Evaluate and treat (2-3 days a week for 4-6 weeks )    2. Cervical radiculopathy  -     XR Spine Cervical Complete With Obli Flex Ext; Future  -     Ambulatory Referral to Physical Therapy Evaluate and treat (2-3 days a week for 4-6 weeks )    3. Former smoker    4. Overweight with body mass index (BMI) of 25 to 25.9 in adult          I discussed the patients findings and my recommendations with patient    Crispin Magana, APRN  02/07/22  09:27 CST

## 2022-02-08 LAB
HCV GENTYP SERPL NAA+PROBE: NORMAL
HCV RNA SERPL NAA+PROBE-ACNC: NORMAL IU/ML
HCV RNA SERPL NAA+PROBE-LOG IU: NORMAL {LOG_IU}/ML
REF LAB TEST REF RANGE: NORMAL

## 2022-02-09 ENCOUNTER — TELEPHONE (OUTPATIENT)
Dept: GASTROENTEROLOGY | Facility: CLINIC | Age: 67
End: 2022-02-09

## 2022-02-09 ENCOUNTER — SPECIALTY PHARMACY (OUTPATIENT)
Dept: ONCOLOGY | Facility: HOSPITAL | Age: 67
End: 2022-02-09

## 2022-02-09 NOTE — TELEPHONE ENCOUNTER
Called pt re: results-spoke to his wife, Susana, as he was unavailable. I gave her the results-she VU and will let pt know. I advised her to have him call me with any questions/problems going forward.

## 2022-02-09 NOTE — TELEPHONE ENCOUNTER
----- Message from JOHNNY Whitman sent at 2/9/2022 10:54 AM CST -----  Please notify the patient that HCV quant was not detected.  No genotype.  It appears that the Harvoni did eradicate the virus.  His hepatitis C antibody that he now carries will continue to show reactive.  No need for follow-up

## 2022-02-10 NOTE — PROGRESS NOTES
[facility]    Oral Oncology Patient Follow-Up      Consult Details  Consulting Provider:   Klever Ferreira MD ([facility])   Medication and Regimen:  Imatinib [Gleevec] 300 mg po daily     Prescription Review  Dosing & Interactions:   Reviewed, appropriate and no significant interaction noted at this time..   Current Specialty Pharmacy [facility]     Intervention(s):                  Spoke with Susana (rina's spouse) for the patient's monthly routine follow-up regarding the oral oncology medication. She stated that he has been tolerating the Gleevec really well and has not had any obvious side effects from the medication. He has not missed any doses while on the medication. The patient still receives refills through [facility], and has not had any delays in getting those refills. Susana also stated that there has been no changes to his maintenance medications and no new allergies that she is aware of.   Finally, Susana stated the patient has not had any recent stays in the hospital. He did visit the ER due to neck pain but nothing to do with his Gleevec therapy. I encouraged the patient to reach out anytime with any questions or concerns they might have regarding their oral chemotherapy.     Summary:    No needs expressed or otherwise identified. Will follow-up next month regarding the patient’s oral chemotherapy with a routine telephone consultation.     Kirsten Campos, PharmD  02/09/2022 18:17 CST

## 2022-02-22 ENCOUNTER — TELEPHONE (OUTPATIENT)
Dept: NEUROSURGERY | Facility: CLINIC | Age: 67
End: 2022-02-22

## 2022-03-04 ENCOUNTER — TELEPHONE (OUTPATIENT)
Dept: NEUROSURGERY | Facility: CLINIC | Age: 67
End: 2022-03-04

## 2022-03-04 NOTE — TELEPHONE ENCOUNTER
Caller: Cayetano Curry  Relationship: Self  Best call back number: [phone]  What was the call regarding: PATIENT IS NOT SURE THAT HE WOULD BE ABLE TO DO PHYSICAL THERAPY  DUE TO THE PAIN AND SYMPTOMS HE IS EXPERIENCING WITH HIS NECK.     PATIENT STATED HE DID GO TO THE LOCATION BUT THEY TOLD HIM HE WOULD NEED A REFERRAL. I ADVISED PATIENT THAT HE SOULD HAVE BEEN GIVEN THE ORDER FOR THE PHYSICAL THERAPY AFTER HIS LAST APPOINTMENT AND AFTER LOOKING THROUGH PAPERWORK THE PATIENT STATED THAT HE DID HAVE THE ORDER.    PLEASE CALL PATIENT ABOUT MOVING UP FOLLOW UP APPOINTMENT, HE WOULD LIKE TO SEE ABOUT GETTING AN EARLIER APPOINTMENT. PATIENT CURRENTLY HAS A 3/30 APPOINTMENT FOR AFTER PHYSICAL THERAPY.

## 2022-03-04 NOTE — TELEPHONE ENCOUNTER
Caller: Cayetano Curry  Relationship: Self  Best call back number: [phone]    What was the call regarding: PATIENT WOULD ALSO LIKE TO KNOW IF DAVON BARRETO HAS LOOKED OVER HIS CT AS HE SAYS HE DROPPED THAT OFF WITH OUR OFFICE.

## 2022-03-07 NOTE — TELEPHONE ENCOUNTER
Tried to return patient's call to let him know that Crispin has reviewed his CT scan and that therapy will still need to be completed, his follow up appointment is to follow physical therapy, was not able to speak with him but have left message on his voice mail for a call back.

## 2022-03-08 NOTE — TELEPHONE ENCOUNTER
Returned patient's call to let him know that he still needs to complete the physical therapy and keep his follow up afterward with Crispin Magana.  Spoke with Susana his wife, she understood and will let Cayetano know.

## 2022-03-15 ENCOUNTER — APPOINTMENT (OUTPATIENT)
Dept: LAB | Facility: HOSPITAL | Age: 67
End: 2022-03-15

## 2022-03-25 ENCOUNTER — SPECIALTY PHARMACY (OUTPATIENT)
Dept: ONCOLOGY | Facility: HOSPITAL | Age: 67
End: 2022-03-25

## 2022-03-25 NOTE — PROGRESS NOTES
[facility]    Oral Oncology Patient Follow-Up      Consult Details  Consulting Provider:   Klever Ferreira MD ([facility])   Medication and Regimen:  Gleevec 300 mg PO daily     Prescription Review  Dosing & Interactions:   Reviewed, appropriate and no significant interaction noted at this time..   Current Specialty Pharmacy [facility]     Intervention(s):                  Spoke with patient's spouse today. She reports Cayetano Curry is doing well. No questions or concerns on his Gleevec regimen. No issues obtaining medication from pharmacy. No financial concerns. No questions. Appointment with physician 4/25. No specialty needs at this time     Summary:    Patient doing well. No specialty needs today. Continue routine follow up.      Micah Fox, PharmD  03/25/2022 15:51 CDT

## 2022-04-25 ENCOUNTER — SPECIALTY PHARMACY (OUTPATIENT)
Dept: ONCOLOGY | Facility: HOSPITAL | Age: 67
End: 2022-04-25

## 2022-05-15 NOTE — PROGRESS NOTES
"[facility] HEMATOLOGY AND ONCOLOGY  [address]  [facility] [id number] [phone]    Patient Name: Cayetano Curry  Encounter Date: 05/18/2022  YOB: 1955  Patient Number: [id number]      REASON FOR VISIT:  Mr. Monteiro \"Jono\" Patricio is a 67-year-old male who returns in follow-up of his chronic phase chronic myelogenous leukemia (CML). It has been about 220 months since initiation of Gleevec. The patient is here alone.     I have reviewed the HPI and verified with the patient the accuracy of it. No changes to interval history since the information was documented. Klever Ferreira MD 05/18/22        DIAGNOSTIC ABNORMALITIES:   1. CBC, 12/10/2003. Total WBC of 135,000 with 28% segs, 40% bands, 8% metamyelocytes, 9% myelocytes, 1% blasts, and 2% nucleated RBC. Hemoglobin of 14, a hematocrit of 43.2, and platelet count of 146,000.  2. Leukocyte alkaline phosphatase, 12/10/2003. 19 (13 to 140).  3. Peripheral smear, 12/10/2003. Marked leukocytosis with entire spectrum of granulocytic precursors from myeloblasts (1%) to PMNs. Occasional nucleated red blood cell. Mild anisocytosis and poikilocytosis. Adequate platelets.  4. Marrow biopsy, 12/10/2003. Hypercellular, packed bone marrow. Marked granulocytic hyperplasia with all stages of maturation including 3% blasts. 1+ stainable iron. Occasional dyspoietic megakaryocytes with normal numbers. Cytometry reveals aberrant CD56 expression on myeloid cells. Cytogenetic studies pending.  5. Ferritin, B12, and LDH, 12/11/2003. 1309, greater than 2000, and 1963 (all grossly elevated).  6. Liver/spleen scan, 12/11/2003. Abnormally decreased activity within the spleen. Liver parenchyma shows normal homogenous activity without focal masses. Mild hepatomegaly. No colloid shift.  7. Chest x-ray, 12/11/2003. Normal.    PREVIOUS INTERVENTIONS:   1. Gleevec, 12/26/2003 through present. Varying doses. " Held 01/30/2004 through 02/27/2004. Held again (), 12/14/2006 through 12/18/2006.        Problem List Items Addressed This Visit        Other    CML (chronic myelocytic leukemia) (HCC) - Primary    Relevant Orders    Comprehensive Metabolic Panel    BCR / ABL Qual By RT-PCR    BCR-ABL FISH    Iron and TIBC    Ferritin    Comprehensive Metabolic Panel    Phosphorus    CBC & Differential        Oncology/Hematology History Overview Note   DIAGNOSTIC ABNORMALITIES:  CBC, 12/10/2003. Total WBC of 135,000 with 28% segs, 40% bands, 8% metamyelocytes, 9% myelocytes, 1% blasts, and 2% nucleated RBC. Hemoglobin of 14, a hematocrit of 43.2, and platelet count of 146,000.  Leukocyte alkaline phosphatase, 12/10/2003. 19 (13 to 140).  Peripheral smear, 12/10/2003. Marked leukocytosis with entire spectrum of granulocytic precursors from myeloblasts (1%) to PMNs. Occasional nucleated red blood cell. Mild anisocytosis and poikilocytosis. Adequate platelets.  Marrow biopsy, 12/10/2003. Hypercellular, packed bone marrow.  Marked granulocytic hyperplasia with all stages of maturation including 3% blasts. 1+ stainable iron. Occasional dyspoietic megakaryocytes with normal numbers. Cytometry reveals aberrant CD56 expression on myeloid cells. Cytogenetic studies pending.  Ferritin, B12, and LDH, 12/11/2003. 1309, greater than 2000, and 1963 (all grossly elevated).  Liver/spleen scan, 12/11/2003. Abnormally decreased activity within the spleen. Liver parenchyma shows normal homogenous activity without focal masses. Mild hepatomegaly. No colloid shift.  Chest x-ray, 12/11/2003. Normal.  PREVIOUS INTERVENTIONS:  Gleevec, 12/26/2003 through present. Varying doses. Held 01/30/2004 through 02/27/2004.  Held again (), 12/14/2006 through 12/18/2006.     CML (chronic myelocytic leukemia) (HCC)   12/10/2003 Initial Diagnosis    CML (chronic myelocytic leukemia) (CMS/HCC)     11/12/2020 -  Chemotherapy    OP CML Imatinib 400 mg          PAST MEDICAL HISTORY:  ALLERGIES:  No Known Allergies  CURRENT MEDICATIONS:  Outpatient Encounter Medications as of 5/18/2022   Medication Sig Dispense Refill   • citalopram (CeleXA) 40 MG tablet Take 40 mg by mouth Daily.     • fluticasone (FLONASE) 50 MCG/ACT nasal spray 2 sprays into the nostril(s) as directed by provider Daily.     • imatinib (GLEEVEC) 100 MG chemo tablet TAKE 3 TABLETS (300MG) BY MOUTH ONCE DAILY AT THE SAME TIME WITH FOOD AND A LARGE GLASS OF WATER. AVOID GRAPEFRUIT PRODUCTS. 90 tablet 11   • Miracle mouthwash oral suspension Swish and spit 10 mL Every 4 (Four) Hours As Needed for Mucositis. 300 mL 0   • omeprazole (priLOSEC) 40 MG capsule Take 40 mg by mouth Daily.     • potassium phosphate, monobasic, (K-Phos) 500 MG tablet Take 1 tablet by mouth 3 (Three) Times a Day. Until finished 9 tablet 0     No facility-administered encounter medications on file as of 5/18/2022.     ADULT ILLNESSES:   CML ( chronic; ICD-10:C92.10 ;Chronic myeloid leukemia, BCR/ABL-positive, not having achieved remission )   Chondromalacia, NOS ( Date of Dx:04/07/2004 Bilateral patellofemoral chondromalacia; ICD-10:M94.20 ;Chondromalacia, unspecified site )   Degenerative joint disease ( ICD-10:M19.90 ;Unspecified osteoarthritis, unspecified site   Drinks alcohol ( consumed a case of beer a week for the past 20-30 years; ICD-10:F10.10 ;Alcohol abuse, uncomplicated )   Ganglion cyst ( excision from right wrist; ICD-10:M67.431 ;Ganglion, right wrist )   Gastroesophageal reflux disease ( ICD-10:K21.9 ;Gastro-esophageal reflux disease without esophagitis   Hepatitis C ( Date of Dx:2000 ; ICD-10:B19.20 ;Unspecified viral hepatitis C without hepatic coma )   Leukopenia ( without neutropenia; ICD-10:D72.819 ;Decreased white blood cell count, unspecified )   Lichen planus ( Recurrent mucocutaneous lichen planus; ICD-10:L43.9 ;Lichen planus, unspecified )   Nephrolithiasis ( on CT scans, 02/2015; ICD-10:N20.0 ;Calculus of  kidney )   Normocytic normochromic anemia (disorder) ( ICD-10:D64.9 ;Anemia, unspecified   Restless legs syndrome ( ICD-10:G25.81 ;Restless legs syndrome   Seasonal allergic rhinitis (disorder) ( ICD-10:J30.2 ;Other seasonal allergic rhinitis   Spinal fusion ( from S1-L5; )    SURGERIES:   Spinal fusion, L5-S1, 1982. No details   Core biopsy of the liver, 11/03/2004. Mild portal inflammation. No hepatic steatosis, portal fibrosis, cirrhosis, or metastatic malignancy. Mild increase in stainable iron (3+). Changes consistent with hepatitis C, Scheuer grade 2, Stage 0   Left knee arthroscopy, 2004, Dr. Harmon   Anterior cervical fusion, allograft and [place] plating, 06/02/2010. Dr. Caceres. C5-C6 and C6-C7 abnormalities   Right knee arthroscopy, 09/19/2008, Dr. Stratton   Neck surgery to replace neck plate after a fall on his face, 09/10/2012   Carpal tunnel release, right wrist, 08/2006. Dr. Stratton   Left knee arthroscopy, 07/02/2008, Dr. Stratton   Ganglion cyst removal, left wrist, 01/20/2011. Dr. Thornton   Left knee replacement, 04/18/2018. Dr. Rizo.   Colonoscopy, 03/31/2016. Normal. Repeat 10 years.   Left shoulder/humerus fracture, with ORIF last 04/03/2019. Dr. Mensah.  06/19/2020- EGD normal with biopsies of the small intestine, duodenum: Benign duodenal mucosa with submucosa.  Histologic changes of celiac sprue not identified.  06/19/2020-colonoscopy.  Internal hemorrhoids otherwise normal.      ADULT ILLNESSES:  Patient Active Problem List   Diagnosis Code   • Anemia D64.9   • CML (chronic myelocytic leukemia) (HCC) C92.10   • Hep C w/o coma, chronic (HCC) B18.2   • GERD (gastroesophageal reflux disease) K21.9   • Adhesive capsulitis of knee, left M76.892   • Bilateral low back pain with right-sided sciatica M54.41   • Chronic midline low back pain with bilateral sciatica M54.41, M54.42, G89.29   • High risk medications (not anticoagulants) long-term use Z79.899   • Hx of lumbosacral spine surgery Z98.890   •  Hyperlipidemia E78.5   • Lumbar facet arthropathy M47.816   • Myofascial pain syndrome M79.18   • Neck pain M54.2   • Cervicalgia M54.2   • Cervical radiculopathy M54.12   • Former smoker Z87.891   • Overweight with body mass index (BMI) of 25 to 25.9 in adult E66.3, Z68.25     SURGERIES:  Past Surgical History:   Procedure Laterality Date   • ANTERIOR CERVICAL FUSION     • CARPAL TUNNEL RELEASE Right    • COLONOSCOPY  03/31/2016    The entire examined colon is normal on direct and retroflexion views; Repeat 10 years    • COLONOSCOPY N/A 6/13/2019    Non-bleeding internal hemorrhoids; The examination was otherwise normal; No specimens collected; Repeat 10 years   • COLONOSCOPY  03/03/2008    Dr. Geiger-Normal; Repeat 5 years   • ENDOSCOPY N/A 6/19/2019    Normal esophagus; Normal stomach; Normal examined duodenum-biopsied   • GANGLION CYST EXCISION     • KNEE ARTHROSCOPY Left    • KNEE ARTHROSCOPY Right    • LIVER BIOPSY  11/03/2004    Mild portal inflammation; No hepatic steatosis, portal fibrosis, cirrhosis, or metastatic malignancy; mild increase in stainable iron (3+); Changes consistent with Hep C, Scheuer grade 2, stage 0   • NECK SURGERY      To replace neck plate after a fall    • ORIF HUMERUS FRACTURE Left    • OTHER SURGICAL HISTORY      arm surgery with hardware   • REPLACEMENT TOTAL KNEE Left    • SPINAL FUSION       HEALTH MAINTENANCE ITEMS:  Health Maintenance Due   Topic Date Due   • Pneumococcal Vaccine 65+ (1 - PCV) Never done   • COVID-19 Vaccine (1) Never done   • Hepatitis B (1 of 3 - Risk 3-dose series) Never done   • ZOSTER VACCINE (1 of 2) Never done   • TDAP/TD VACCINES (2 - Tdap) 06/03/2007   • ANNUAL WELLNESS VISIT  Never done   • LIPID PANEL  07/08/2020       <no information>  Last Completed Colonoscopy          COLORECTAL CANCER SCREENING (COLONOSCOPY - Every 10 Years) Next due on 6/13/2029 06/13/2019  COLONOSCOPY    06/13/2019  Surgical Procedure: COLONOSCOPY    03/31/2016  SCANNED -  "COLONOSCOPY    2008  SCANNED - COLONOSCOPY                There is no immunization history on file for this patient.  Last Completed Mammogram     This patient has no relevant Health Maintenance data.            FAMILY HISTORY:  Family History   Problem Relation Age of Onset   • Prostate cancer Father    • No Known Problems Mother    • No Known Problems Maternal Grandmother    • No Known Problems Maternal Grandfather    • No Known Problems Paternal Grandmother    • No Known Problems Paternal Grandfather    • Colon cancer Neg Hx    • Colon polyps Neg Hx    • Esophageal cancer Neg Hx    • Liver cancer Neg Hx    • Liver disease Neg Hx    • Rectal cancer Neg Hx    • Stomach cancer Neg Hx      SOCIAL HISTORY:  Social History     Socioeconomic History   • Marital status:    Tobacco Use   • Smoking status: Former Smoker     Quit date: 1975     Years since quittin.9   • Smokeless tobacco: Never Used   Vaping Use   • Vaping Use: Never used   Substance and Sexual Activity   • Alcohol use: Yes     Comment: Occasionally    • Drug use: No   • Sexual activity: Defer       REVIEW OF SYSTEMS:  Gleevec tolerance:   No subjective problems with bouts of loose stools. Denies headaches. \"No problems at all.\"    Constitutional:   The patient's appetite is good, \"I eat like a horse.\" His energy is fairly good, \"for a 68 yo.\" Remains active, \"my neck, left shoulder, knee and back all bother me.\" Is no longer seen by Pain Management.  Says he is not on any pain meds.  Says he is being scheduled for neck surgery (Dr. Ngo) for revision of the prior cervical fusion.  He has no fevers or chills. He has not had any bouts of non-drenching night sweats. He has lost 2 pounds (in addition to 7 pounds at his prior visit) in the interval since his last visit. His sleep habits have been fairly good. Received # 2 COVID vaccine, 2021  Ear/Nose/Throat/Mouth:   He has perennial sinus symptoms. He has recurrent mouth sores " "previously modulated by Miracle Mouthwash. \"It's the bad teeth.\" He has no ear pains, sore throat, or nosebleeds. He has no headaches.  Ocular:   He denies eye pain, significant change in visual acuity, double vision, or blurry vision.  Respiratory:   He has no significant shortness of breathing, cough, phlegm production, or unexplained chest wall pain.  Cardiovascular:   He has no exertional chest pain. He has no claudication. He has no palpitations or symptomatic orthostasis.   Gastrointestinal:   He has not had any bouts of postprandial pyrosis on omeprazole. He has no dysphagia, nausea, vomiting, no bloating, abdominal pain or cramping. He has no jaundice. He has infrequent loose stools. Has no dark (off oral iron) discoloration of the stool. Was tolerating oral iron bid.  He has had no rectal bleeding. He has not been constipated since stopping Percocet. Had repeat colonoscopy (above), 03/31/2016. Normal.   Genitourinary:   He has no urinary burning, frequency, dribbling, or discoloration. He has no difficulty controlling his bladder. He has no need to urinate frequently through the night.  Musculoskeletal:   He has chronic left knee, neck, left shoulder and back pains. He stopped all pain meds (Percocet). His left knee still bothers him. Dr. Rizo follows. He has lingering left shoulder pains after ORIF last 04/05/2019.   Endocrine:   He has no problems with excess thirst, excessive urination, vasomotor instability, or unexplained fatigue.  Heme/Lymphatic:   He has no unexplained bleeding, bruising, petechial rashes, or swollen glands.  Skin:   He has not had any recurrent furuncles. He has no itching.  Neuro:   The right hand numbness and weakness seems to have resolved (had redo cervical fusion, 09/2012). He has no loss of consciousness, seizures, fainting spells, or dizziness. He has no weakness of his face, arms, or legs. He has no difficulty with speech. He has no tremors. He has intermittent restless " "legs. Has been on Neurontin to good effect.  Psych:   He seems generally satisfied with life. He denies depression or anxiety.        VITAL SIGNS: /76   Pulse 80   Temp 97.6 °F (36.4 °C)   Resp 16   Ht 165.1 cm (65\")   Wt 66.8 kg (147 lb 3.2 oz)   SpO2 97%   BMI 24.50 kg/m² Body surface area is 1.74 meters squared.  Pain Score    05/18/22 1516   PainSc:   4     I have reexamined the patient and the results are consistent with the previously documented exam. Klever Ferreira MD      PHYSICAL EXAMINATION:   General:   He is an extremely-pleasant, slender, leaner, modestly kept male in no distress. He is ambulatory. ECOG PS = 0  Head/Neck:   The patient is anicteric. Wearing a surgical mask.  Poor dentition. The trachea is midline. The neck is supple without evidence of jugular venous distention or cervical adenopathy.   Eyes:   The extraocular movements are full. There is no scleral jaundice or erythema.  Chest:  The respiratory efforts are normal and unhindered. The chest is clear to auscultation. There are no wheezes, rhonchi, rales, or asymmetry of breath sounds.  Cardiovascular:   The patient has a regular cardiac rate and rhythm without murmurs, rubs, or gallops.  Abdomen:   The belly is soft and slightly globose. There is no rebound or guarding. There is no organomegaly, mass-effect, or tenderness.  Extremities:   There is no evidence of cyanosis, clubbing, or edema.   Rheumatologic:   There is no overt evidence of rheumatoid deformities of the hands. There is no sausaging of the fingers. There is no sign of active synovitis. The gait is independent and no longer antalgic (prior left knee replacement).  Cutaneous:   There are no disseminated lesions, purpura, or petechiae.   Lymphatics:   There is no evidence of adenopathy in the cervical, supraclavicular, axillary, inguinal, or femoral areas. There is no splenomegaly.  Neurologic:   The patient is alert, oriented, cooperative, and pleasant. He is " appropriately conversant. He is fully ambulatory and able to get up onto the exam table without assistance. There is no overt dysfunction of the motor, sensory, or cerebellar systems.  Psych:   Mood and affect are appropriate for circumstance. Eye contact is appropriate.        LABS    Lab Results - Last 18 Months   Lab Units 05/16/22  1052 09/22/21  1459 03/24/21  0946 01/13/21  1352   HEMOGLOBIN g/dL 14.9 14.0 15.7 14.3   HEMATOCRIT % 45.8 42.9 46.0 44.4   MCV fL 94.0 94.9 89.1 93.1   WBC 10*3/mm3 4.88 5.26 6.61 6.29   RDW % 13.7 13.7 13.8 13.5   MPV fL 9.9 10.4 9.5 9.8   PLATELETS 10*3/mm3 205 196 238 190   IMM GRAN % % 0.2 0.4 0.3 0.3   NEUTROS ABS 10*3/mm3 2.75 2.44 3.87 3.46   LYMPHS ABS 10*3/mm3 0.94 1.80 1.41 1.65   MONOS ABS 10*3/mm3 0.45 0.62 0.71 0.60   EOS ABS 10*3/mm3 0.65* 0.33 0.53* 0.51*   BASOS ABS 10*3/mm3 0.08 0.05 0.07 0.05   IMMATURE GRANS (ABS) 10*3/mm3 0.01 0.02 0.02 0.02   NRBC /100 WBC 0.0 0.0 0.0 0.0       Lab Results - Last 18 Months   Lab Units 05/16/22  1052 09/22/21  1459 03/24/21  0946 01/13/21  1352   GLUCOSE mg/dL 181* 123* 114* 97   SODIUM mmol/L 139 139 136 141   POTASSIUM mmol/L 4.3 3.9 4.0 4.1   CO2 mmol/L 27.0 29.0 29.0 30.0*   CHLORIDE mmol/L 103 102 99 103   ANION GAP mmol/L 9.0 8.0 8.0 8.0   CREATININE mg/dL 1.05 1.00 1.14 1.08   BUN mg/dL 13 9 13 13   BUN / CREAT RATIO  12.4 9.0 11.4 12.0   CALCIUM mg/dL 9.1 9.1 8.8 9.1   EGFR IF NONAFRICN AM mL/min/1.73  --  75 65 69   ALK PHOS U/L 66 64 78 67   TOTAL PROTEIN g/dL 6.8 7.1 7.4 7.0   ALT (SGPT) U/L 58* 63* 30 21   AST (SGOT) U/L 61* 75* 40 28   BILIRUBIN mg/dL 0.6 0.7 1.2 0.6   ALBUMIN g/dL 4.30 4.60 4.30 4.20   GLOBULIN gm/dL 2.5 2.5 3.1 2.8       Lab Results - Last 18 Months   Lab Units 09/22/21  1459 03/24/21  0946 01/13/21  1352   REFERENCE LAB REPORT  See Attached Report  See Attached Value See Attached Result  See Attached Result see attached report       Lab Results - Last 18 Months   Lab Units 05/16/22  1052  09/22/21  1459 03/24/21  0946 01/13/21  1352   IRON mcg/dL 86 86 147 110   TIBC mcg/dL 344 326 399 364   IRON SATURATION % 25 26 37 30   FERRITIN ng/mL 233.60 305.80 190.50 124.80       ASSESSMENT:   1. Chronic myelogenous leukemia (CML):   Stage: Chronic phase.   Tumor burden: Marrow involvement. No hepatosplenomegaly.   Complications of tumor: None. No manifestations of hyperviscosity.   Tolerance to therapy: Cytopenias and liver function test (LFT) abnormalities.   Status: Cytogenetic remission, with hematologic remission. No detectable disease (bone marrow biopsy, 03/30/2007 - no BCR-ABL fusion transcript was detected by real-time PCR) on current dose of Gleevec.   No BCR-ABL transcript detected (IS: less than 0.1% - Major Molecular Response), 09/06/2018.   NEGATIVE for the BCR-ABL1 e1a2 (p190), e13a2 (b2a2, p210) and e14a2 (b3a2, p210) fusion transcripts, 01/22/2019. The standardized baseline is 100% BCR-ABL1 (IS) and major molecular response (MMR) is equivalent to 0.1% BCRABL1 (IS)   NEGATIVE for the BCR-ABL1 e1a2 (p190), e13a2 (b2a2, p210) and e14a2 (b3a2, p210) fusion transcripts, 05/17/2019. The standardized baseline is 100% BCR-ABL1 (IS) and major molecular response (MMR) is equivalent to 0.1% BCRABL1 (IS)  01/13/2021-FISH for BCR/ABL 1 rearrangement-findings: Negative for BCR/ABL 1 rearrangement (percent positive nuclei-0).  09/22/2021-FISH ALL panel: Negative MYC rearrangement, BCR/ABL 1 rearrangement, KMT2A (MLL), trisomy 6, trisomy 21  09/22/2021-BCR/ABL 1 quantitative PCR analysis .  The BCR/ABL 1 translocation was not detected (below the sensitivity limit of the assay,<0.001%).    2. Hepatitis C with chronic elevation of liver function tests (LFTs).   Previous liver biopsy negative for cirrhosis or malignancy.   Colonoscopy 03/31/2016. The entire examined colon appeared normal on direct and retroflexion views.   Completed hepatitis C Rx (Harvoni) beginning 05/13/2016 (daily x8 weeks). Followed for  "gastroenterology (GI) by Dr. Rosas.   Recurrent transaminitis, 05/17/2019 06/06/2019- liver ultrasound.  Impression: Mildly coarsened liver echogenicity which can be seen with chronic liver disease.  06/19/2019-EGD normal.  Colonoscopy with internal hemorrhoids otherwise normal.  3. Normocytic anemia, Gleevec associated.   --Stable, Hgb 14.9 on 05/16/2022 (prior range: Hgb 12.6 - 15.7).   4. Leukopenia without neutropenia. Gleevec associated. Normal counts since 12/14/2016.   5. Acute kidney injury.   --Improved, GFR 78 mL/min, 05/16/2022 (prior range: 59.6 -72 mL/min). Followed by nephrology.   6. Gastroesophageal reflux disease (GERD), well-modulated on proton pump inhibitor (PPI).   7. Degenerative joint disease (DJD), knees and back especially. Pain management (Ortho - Dr. Rubio) follows.   8. Alcohol (ETOH) use. Says he has not imbibed at all since 07/2009, \"not since.\"   9. C5-C6 and C6-C7 abnormalities with disk rupture with brachial neuritis/radiculitis. Had prior anterior cervical fusion, allograft, and [place] plating, 06/02/2010. Dr. Caceres. Underwent redo surgery, 09/2012 after a fall and hyperextension injury.   10. Restless legs, well-modulated on Neurontin as needed.   11. Seen by [facility] Pain Management (Dr. Rubio), 08/22/2018. Impression: Myofascial pain syndrome/radiculopathy of lumbar region. Given Rx for Percocet. Assumed opioid prescribing.   12. Poor dentition  13. A bit self directed.   14.  Hyperglycemia.  New onset diabetes?    PLAN:   1. Apprise of labs from 05/16/2022 with resolution of anemia, normal WBC and otherwise normal CBC, and other labs including the new onset hyperglycemia, stable GFR, elevated (intermittent/stable) AST/ALT, slightly depressed phosphorus (K-Phos called in), otherwise normal CMP, repleted ferritin, repleted iron, repleted Fe sat (> 20%).    2.  Redraw CMP and phosphorus level today.  3.   Appoint to PCP re: hyperglycemia, possibly new onset diabetes?  4.  " Note pending QT PCR for BCR/ABL translocation (previously below sensitivity limit of assay,<0.001%- 09/22/2021)    5. Continue Gleevec 300 mg daily ([facility] mails to him).   6.  Previously apprised of liver ultrasound, 06/06/2019 showing coarse liver and chronic liver disease, and EGD/colonoscopy, 06/19/2019 (above) with normal EGD and colonoscopy showing internal hemorrhoids otherwise normal.    7.  Stay off Ferrous sulfate (ferritin > 100)         8.  Rx: K-Phos p.o. 3 times daily x3 days                    Magic mouthwash 4-5x/day x 30 day supply          9. Continue other currently identified medications. He gets his Gleevec mailed in - [facility].   10. Continue ongoing management per primary care physician and other specialists.   11. Return to the [place] office with pre-office RT-PCR for BCR-ABL, serum iron, Fe saturation, ferritin, CMP, phosphorus, and CBC with differential in 24 weeks.    MEDICAL DECISION MAKING: Moderate Complexity   AMOUNT OF DATA: Moderate     I  spent ~33 minutes caring for Cayetano on this date of service. This time includes time spent by me in the following activities: preparing for the visit, reviewing tests, performing a medically appropriate examination and/or evaluation, counseling and educating the patient/family/caregiver, ordering medications, tests, or procedures and documenting information in the medical record      cc:  NEHAL Wallace

## 2022-05-16 ENCOUNTER — TELEPHONE (OUTPATIENT)
Dept: ONCOLOGY | Facility: CLINIC | Age: 67
End: 2022-05-16

## 2022-05-16 ENCOUNTER — LAB (OUTPATIENT)
Dept: LAB | Facility: HOSPITAL | Age: 67
End: 2022-05-16

## 2022-05-16 DIAGNOSIS — E83.39 LOW PHOSPHATE LEVELS: Primary | ICD-10-CM

## 2022-05-16 DIAGNOSIS — C92.10 CML (CHRONIC MYELOCYTIC LEUKEMIA): ICD-10-CM

## 2022-05-16 LAB
ALBUMIN SERPL-MCNC: 4.3 G/DL (ref 3.5–5.2)
ALBUMIN/GLOB SERPL: 1.7 G/DL
ALP SERPL-CCNC: 66 U/L (ref 39–117)
ALT SERPL W P-5'-P-CCNC: 58 U/L (ref 1–41)
ANION GAP SERPL CALCULATED.3IONS-SCNC: 9 MMOL/L (ref 5–15)
AST SERPL-CCNC: 61 U/L (ref 1–40)
BASOPHILS # BLD AUTO: 0.08 10*3/MM3 (ref 0–0.2)
BASOPHILS NFR BLD AUTO: 1.6 % (ref 0–1.5)
BILIRUB SERPL-MCNC: 0.6 MG/DL (ref 0–1.2)
BUN SERPL-MCNC: 13 MG/DL (ref 8–23)
BUN/CREAT SERPL: 12.4 (ref 7–25)
CALCIUM SPEC-SCNC: 9.1 MG/DL (ref 8.6–10.5)
CHLORIDE SERPL-SCNC: 103 MMOL/L (ref 98–107)
CO2 SERPL-SCNC: 27 MMOL/L (ref 22–29)
CREAT SERPL-MCNC: 1.05 MG/DL (ref 0.76–1.27)
DEPRECATED RDW RBC AUTO: 47.7 FL (ref 37–54)
EGFRCR SERPLBLD CKD-EPI 2021: 78.8 ML/MIN/1.73
EOSINOPHIL # BLD AUTO: 0.65 10*3/MM3 (ref 0–0.4)
EOSINOPHIL NFR BLD AUTO: 13.3 % (ref 0.3–6.2)
ERYTHROCYTE [DISTWIDTH] IN BLOOD BY AUTOMATED COUNT: 13.7 % (ref 12.3–15.4)
FERRITIN SERPL-MCNC: 233.6 NG/ML (ref 30–400)
GLOBULIN UR ELPH-MCNC: 2.5 GM/DL
GLUCOSE SERPL-MCNC: 181 MG/DL (ref 65–99)
HCT VFR BLD AUTO: 45.8 % (ref 37.5–51)
HGB BLD-MCNC: 14.9 G/DL (ref 13–17.7)
IMM GRANULOCYTES # BLD AUTO: 0.01 10*3/MM3 (ref 0–0.05)
IMM GRANULOCYTES NFR BLD AUTO: 0.2 % (ref 0–0.5)
IRON 24H UR-MRATE: 86 MCG/DL (ref 59–158)
IRON SATN MFR SERPL: 25 % (ref 20–50)
LYMPHOCYTES # BLD AUTO: 0.94 10*3/MM3 (ref 0.7–3.1)
LYMPHOCYTES NFR BLD AUTO: 19.3 % (ref 19.6–45.3)
MCH RBC QN AUTO: 30.6 PG (ref 26.6–33)
MCHC RBC AUTO-ENTMCNC: 32.5 G/DL (ref 31.5–35.7)
MCV RBC AUTO: 94 FL (ref 79–97)
MONOCYTES # BLD AUTO: 0.45 10*3/MM3 (ref 0.1–0.9)
MONOCYTES NFR BLD AUTO: 9.2 % (ref 5–12)
NEUTROPHILS NFR BLD AUTO: 2.75 10*3/MM3 (ref 1.7–7)
NEUTROPHILS NFR BLD AUTO: 56.4 % (ref 42.7–76)
NRBC BLD AUTO-RTO: 0 /100 WBC (ref 0–0.2)
PHOSPHATE SERPL-MCNC: 2.2 MG/DL (ref 2.5–4.5)
PLATELET # BLD AUTO: 205 10*3/MM3 (ref 140–450)
PMV BLD AUTO: 9.9 FL (ref 6–12)
POTASSIUM SERPL-SCNC: 4.3 MMOL/L (ref 3.5–5.2)
PROT SERPL-MCNC: 6.8 G/DL (ref 6–8.5)
RBC # BLD AUTO: 4.87 10*6/MM3 (ref 4.14–5.8)
SODIUM SERPL-SCNC: 139 MMOL/L (ref 136–145)
TIBC SERPL-MCNC: 344 MCG/DL (ref 298–536)
TRANSFERRIN SERPL-MCNC: 231 MG/DL (ref 200–360)
WBC NRBC COR # BLD: 4.88 10*3/MM3 (ref 3.4–10.8)

## 2022-05-16 PROCEDURE — 80053 COMPREHEN METABOLIC PANEL: CPT

## 2022-05-16 PROCEDURE — 83540 ASSAY OF IRON: CPT

## 2022-05-16 PROCEDURE — 85025 COMPLETE CBC W/AUTO DIFF WBC: CPT

## 2022-05-16 PROCEDURE — 82728 ASSAY OF FERRITIN: CPT

## 2022-05-16 PROCEDURE — 36415 COLL VENOUS BLD VENIPUNCTURE: CPT

## 2022-05-16 PROCEDURE — 84466 ASSAY OF TRANSFERRIN: CPT

## 2022-05-16 PROCEDURE — 84100 ASSAY OF PHOSPHORUS: CPT

## 2022-05-16 RX ORDER — POTASSIUM PHOSPHATE, MONOBASIC 500 MG/1
500 TABLET, SOLUBLE ORAL 3 TIMES DAILY
Qty: 9 TABLET | Refills: 0 | Status: SHIPPED | OUTPATIENT
Start: 2022-05-16 | End: 2022-12-02 | Stop reason: ALTCHOICE

## 2022-05-16 NOTE — TELEPHONE ENCOUNTER
Notified patient wife that Cayetano Phosphorus level was 2.2, prescription for oral phosphorus was to be sent to his pharmacy for  start today and recheck his counts again at later apt this week. Wife gemma Rausch On license of [facility] for Wed 5/18/22

## 2022-05-16 NOTE — TELEPHONE ENCOUNTER
----- Message from Klever Ferreira MD sent at 5/16/2022 12:16 PM CDT -----  Phosphorus 2.2-please: K-Phos p.o. 3 times daily x3 days-redraw phosphorus level at his office visit later in the week thank you

## 2022-05-18 ENCOUNTER — OFFICE VISIT (OUTPATIENT)
Dept: ONCOLOGY | Facility: CLINIC | Age: 67
End: 2022-05-18

## 2022-05-18 ENCOUNTER — TELEPHONE (OUTPATIENT)
Dept: NEUROSURGERY | Facility: CLINIC | Age: 67
End: 2022-05-18

## 2022-05-18 ENCOUNTER — LAB (OUTPATIENT)
Dept: LAB | Facility: HOSPITAL | Age: 67
End: 2022-05-18

## 2022-05-18 VITALS
OXYGEN SATURATION: 97 % | TEMPERATURE: 97.6 F | WEIGHT: 147.2 LBS | HEIGHT: 65 IN | DIASTOLIC BLOOD PRESSURE: 76 MMHG | RESPIRATION RATE: 16 BRPM | BODY MASS INDEX: 24.53 KG/M2 | HEART RATE: 80 BPM | SYSTOLIC BLOOD PRESSURE: 122 MMHG

## 2022-05-18 DIAGNOSIS — C92.10 CML (CHRONIC MYELOCYTIC LEUKEMIA): ICD-10-CM

## 2022-05-18 DIAGNOSIS — C92.10 CML (CHRONIC MYELOCYTIC LEUKEMIA): Primary | ICD-10-CM

## 2022-05-18 DIAGNOSIS — E83.39 LOW PHOSPHATE LEVELS: ICD-10-CM

## 2022-05-18 LAB
ALBUMIN SERPL-MCNC: 4.7 G/DL (ref 3.5–5.2)
ALBUMIN/GLOB SERPL: 2.1 G/DL
ALP SERPL-CCNC: 72 U/L (ref 39–117)
ALT SERPL W P-5'-P-CCNC: 74 U/L (ref 1–41)
ANION GAP SERPL CALCULATED.3IONS-SCNC: 9 MMOL/L (ref 5–15)
AST SERPL-CCNC: 79 U/L (ref 1–40)
BILIRUB SERPL-MCNC: 0.7 MG/DL (ref 0–1.2)
BUN SERPL-MCNC: 8 MG/DL (ref 8–23)
BUN/CREAT SERPL: 8.2 (ref 7–25)
CALCIUM SPEC-SCNC: 9.5 MG/DL (ref 8.6–10.5)
CHLORIDE SERPL-SCNC: 102 MMOL/L (ref 98–107)
CO2 SERPL-SCNC: 29 MMOL/L (ref 22–29)
CREAT SERPL-MCNC: 0.97 MG/DL (ref 0.76–1.27)
EGFRCR SERPLBLD CKD-EPI 2021: 86.6 ML/MIN/1.73
GLOBULIN UR ELPH-MCNC: 2.2 GM/DL
GLUCOSE SERPL-MCNC: 158 MG/DL (ref 65–99)
PHOSPHATE SERPL-MCNC: 2.7 MG/DL (ref 2.5–4.5)
POTASSIUM SERPL-SCNC: 5.1 MMOL/L (ref 3.5–5.2)
PROT SERPL-MCNC: 6.9 G/DL (ref 6–8.5)
SODIUM SERPL-SCNC: 140 MMOL/L (ref 136–145)

## 2022-05-18 PROCEDURE — 84100 ASSAY OF PHOSPHORUS: CPT

## 2022-05-18 PROCEDURE — 36415 COLL VENOUS BLD VENIPUNCTURE: CPT

## 2022-05-18 PROCEDURE — 99214 OFFICE O/P EST MOD 30 MIN: CPT | Performed by: INTERNAL MEDICINE

## 2022-05-18 PROCEDURE — 80053 COMPREHEN METABOLIC PANEL: CPT

## 2022-05-27 ENCOUNTER — SPECIALTY PHARMACY (OUTPATIENT)
Dept: ONCOLOGY | Facility: HOSPITAL | Age: 67
End: 2022-05-27

## 2022-05-27 NOTE — PROGRESS NOTES
[facility]    Oral Oncology Patient Outreach      Prescription Details  Consulting Provider:   Klever Ferreira MD ([facility] On[facility])   Medication and Regimen:  Gleevec 300 mg PO daily       An attempt was made by the Oral Oncology Clinic to contact this patient for a follow-up on their chemotherapy medication. The Oral Oncology Clinic can be reached at [phone].        Electronically signed 05/27/2022 16:25 CDT by:  Gerardo Agrawal, PharmD  Specialty Pharmacist - Hematology & Oncology  [facility]  [phone]

## 2022-05-31 ENCOUNTER — SPECIALTY PHARMACY (OUTPATIENT)
Dept: ONCOLOGY | Facility: HOSPITAL | Age: 67
End: 2022-05-31

## 2022-05-31 NOTE — PROGRESS NOTES
[facility]    Oral Oncology Patient Follow-Up      Consult Details  Consulting Provider:   Klever Ferreira MD ([facility] [place] [facility])   Medication and Regimen:  Gleevec 300 mg PO daily     Prescription Review  Dosing & Interactions:   Reviewed, appropriate and no significant interaction noted at this time..   Current Specialty Pharmacy [facility]       Intervention(s):                  Spoke with Susana (patient's spouse) for patient's monthly routine telephone consultation follow-up regarding the oral oncology medication. She stated that he has been tolerating the Gleevec well and has not had any obvious side effects from the medication. He has not missed any doses while on the medication. The patient still receives refills through [facility], and has not had any delays in getting those refills.   Finally, the patient's spouse stated he has not had any recent stays in the hospital and has not visited the ER in the last month due to the Gleevec. I encouraged the patient to reach out anytime with any questions or concerns they might have regarding their oral chemotherapy.     Summary:    No needs expressed by the patient or otherwise identified. Will follow-up next month regarding the patient’s oral chemotherapy with a routine telephone consultation. The next routine follow-up will be scheduled approximately 28-30 days from today.       Electronically signed 05/31/2022 16:31 CDT by:  Kirsten Campos PharmD  Specialty Pharmacist - Hematology & Oncology  [facility]  [phone]

## 2022-06-03 LAB — REF LAB TEST METHOD: NORMAL

## 2022-06-23 ENCOUNTER — SPECIALTY PHARMACY (OUTPATIENT)
Dept: ONCOLOGY | Facility: HOSPITAL | Age: 67
End: 2022-06-23

## 2022-06-23 NOTE — PROGRESS NOTES
Specialty Pharmacy Refill Coordination Note     Cayetano is a 67 y.o. male contacted today regarding refills of  Gleevec specialty medication(s).    Reviewed and verified with patient: Spoke with patient for patient's monthly routine telephone consultation follow-up regarding the oral oncology medication. He stated that he has been tolerating the Gleevec well and has not had any obvious side effects from the medication. He has not missed any doses while on the medication. The patient still receives refills through [facility], and has not had any delays in getting those refills.   Finally, the patient stated he has not had any recent stays in the hospital and has not visited the ER in the last month due to the Gleevec. I encouraged the patient to reach out anytime with any questions or concerns they might have regarding their oral chemotherapy.            Specialty medication(s) and dose(s) confirmed: yes    Refill Questions    Flowsheet Row Most Recent Value   Changes to allergies? No   Changes to medications? No   New conditions since last clinic visit No   Unplanned office visit, urgent care, ED, or hospital admission in the last 4 weeks  No   How does patient/caregiver feel medication is working? Very good   Financial problems or insurance changes  No   Since the previous refill, were any specialty medication doses or scheduled injections missed or delayed?  No   Does this patient require a clinical escalation to a pharmacist? No                Medication Adherence    Any gaps in refill history greater than 2 weeks in the last 3 months: no  Demonstrates understanding of importance of adherence: yes  Informant: patient          Follow-up: 4 month(s)      Lorraine Harris CPhT  Specialty Pharmacy Technician and Care Coordinator  Pharmacy Patient   [facility]  [phone]- Office    Lorraine Harris  Specialty Pharmacy Technician

## 2022-07-20 ENCOUNTER — SPECIALTY PHARMACY (OUTPATIENT)
Dept: ONCOLOGY | Facility: HOSPITAL | Age: 67
End: 2022-07-20

## 2022-07-20 NOTE — PROGRESS NOTES
"[facility]    Oral Oncology Patient Follow-Up      Consult Details  Consulting Provider:   Klever Ferreira MD ([facility] Hematology & Oncology)   Medication and Regimen:  Gleevec 300 mg PO daily     Prescription Review  Dosing & Interactions:   Reviewed, appropriate and no significant interaction noted at this time..   Current Specialty Pharmacy [facility]       Intervention(s):                  Spoke with Susana (patient's spouse) for patient's monthly routine telephone consultation follow-up regarding the oral oncology medication. She stated that he has been tolerating the Gleevec well and has not had any obvious side effects from the medication - \"he is doing fine. No changes.\" He has not missed any doses while on the medication. The patient still receives refills through [facility], and has not had any delays in getting those refills.   Finally, the patient's spouse stated he has not had any recent stays in the hospital and has not visited the ER in the last month due to the Gleevec. I encouraged the patient to reach out anytime with any questions or concerns they might have regarding their oral chemotherapy.     Summary:    No needs expressed by the patient or otherwise identified. Will follow-up next month regarding the patient’s oral chemotherapy with a routine telephone consultation. The next routine follow-up will be scheduled approximately 28-30 days from today.       Electronically signed 07/20/2022 16:25 CDT by:  Kirsten Campos PharmD  Specialty Pharmacist - Hematology & Oncology  [facility]  [phone]    "

## 2022-08-11 ENCOUNTER — PATIENT OUTREACH (OUTPATIENT)
Dept: ONCOLOGY | Facility: HOSPITAL | Age: 67
End: 2022-08-11

## 2022-08-11 NOTE — OUTREACH NOTE
[facility]    Oral Oncology Patient Outreach      Prescription Details  Consulting Provider:   Klever Ferreira MD ([facility]ematology [facility] Oncology)   Medication and Regimen:  Gleevec 300 mg PO daily       An attempt was made by the Oral Oncology Clinic to contact this patient for a follow-up on their chemotherapy medication. The Oral Oncology Clinic can be reached at [phone].     Attempted to contact patient on 8/11/22 for follow-up, will try again tomorrow (8/12/22) before deferring out to next month.       Electronically signed 08/11/2022 15:56 CDT by:  Kirsten Campos, Antoinette  Specialty Pharmacist - Hematology & Oncology  [facility]  [phone]

## 2022-09-08 ENCOUNTER — PATIENT OUTREACH (OUTPATIENT)
Dept: ONCOLOGY | Facility: HOSPITAL | Age: 67
End: 2022-09-08

## 2022-09-08 NOTE — OUTREACH NOTE
[facility]    Oral Oncology Patient Outreach      Prescription Details  Consulting Provider:   Klever Ferreira MD ([facility] [place] [facility])   Medication and Regimen:  Gleevec 300 mg PO daily       An attempt was made by the Oral Oncology Clinic to contact this patient for a follow-up on their chemotherapy medication. The Oral Oncology Clinic can be reached at [phone].     Attempted to contact patient on 9/8/22 for follow-up, will try again tomorrow (9/9/22) before deferring out to next month.       Electronically signed 09/08/2022 15:52 CDT by:  Kirsten Campos, Antoinette  Specialty Pharmacist - Hematology & Oncology  [facility]  [phone]

## 2022-10-06 ENCOUNTER — PATIENT OUTREACH (OUTPATIENT)
Dept: ONCOLOGY | Facility: HOSPITAL | Age: 67
End: 2022-10-06

## 2022-10-06 NOTE — OUTREACH NOTE
[facility]    Oral Oncology Patient Outreach      Prescription Details  Consulting Provider:   Klever Ferreira MD ([facility] [place] [facility])   Medication and Regimen:  Gleevec 300 mg PO daily       An attempt was made by the Oral Oncology Clinic to contact this patient for a follow-up on their chemotherapy medication. The Oral Oncology Clinic can be reached at [phone].     Attempted to contact patient on 10/6/22 for follow-up, will try again tomorrow (10/7/22) before deferring out to next month.       Electronically signed 10/06/2022 13:03 CDT by:  Kirsten Campos, Antoinette  Specialty Pharmacist - Hematology & Oncology  [facility]  [phone]

## 2022-10-11 ENCOUNTER — SPECIALTY PHARMACY (OUTPATIENT)
Dept: ONCOLOGY | Facility: HOSPITAL | Age: 67
End: 2022-10-11

## 2022-10-21 ENCOUNTER — TRANSCRIBE ORDERS (OUTPATIENT)
Dept: ADMINISTRATIVE | Facility: HOSPITAL | Age: 67
End: 2022-10-21

## 2022-10-21 ENCOUNTER — HOSPITAL ENCOUNTER (OUTPATIENT)
Dept: GENERAL RADIOLOGY | Facility: HOSPITAL | Age: 67
Discharge: HOME | End: 2022-10-21
Admitting: NURSE PRACTITIONER

## 2022-10-21 DIAGNOSIS — R07.82 INTERCOSTAL PAIN: Primary | ICD-10-CM

## 2022-10-21 DIAGNOSIS — R07.82 INTERCOSTAL PAIN: ICD-10-CM

## 2022-10-21 PROCEDURE — 71046 X-RAY EXAM CHEST 2 VIEWS: CPT

## 2022-11-04 ENCOUNTER — SPECIALTY PHARMACY (OUTPATIENT)
Dept: ONCOLOGY | Facility: HOSPITAL | Age: 67
End: 2022-11-04

## 2022-11-04 NOTE — PROGRESS NOTES
Specialty Pharmacy Refill Coordination Note     Cayetano is a 67 y.o. male contacted today regarding refills of  Gleevec specialty medication(s).    Reviewed and verified with patientSpoke with Susana (patient's spouse) for patient's monthly routine telephone consultation follow-up regarding the oral oncology medication. She stated that he has been tolerating the Gllevec really well and has not had any obvious side effects from the medication. He has not missed any doses of the medication in the last month. The patient still receives refills through [facility], and has not had any delays in getting those refills. Also, no new refills are needed at this time according to the patient. The patient has no changes to his maintenance medications and no new allergies that she is aware of.   Finally, the patient has not had any recent stays in the hospital and has not visited the ER in the last month due to the Gleevec. I encouraged the patient to reach out anytime with any questions or concerns they might have regarding their oral chemotherapy.   :       Specialty medication(s) and dose(s) confirmed: yes    Refill Questions    Flowsheet Row Most Recent Value   Changes to allergies? No   Changes to medications? No   New conditions since last clinic visit No   Unplanned office visit, urgent care, ED, or hospital admission in the last 4 weeks  No   How does patient/caregiver feel medication is working? Good   Financial problems or insurance changes  No   Since the previous refill, were any specialty medication doses or scheduled injections missed or delayed?  No   Does this patient require a clinical escalation to a pharmacist? No                     Follow-up: 4 week(s)   Lorraine Harris CPhT  Specialty Pharmacy Technician and Care Coordinator  Pharmacy Patient   [facility]  [phone]- Office       Lorraine Harris  Specialty Pharmacy Technician

## 2022-11-10 ENCOUNTER — TRANSCRIBE ORDERS (OUTPATIENT)
Dept: ADMINISTRATIVE | Facility: HOSPITAL | Age: 67
End: 2022-11-10

## 2022-11-10 DIAGNOSIS — J98.6 DISORDERS OF DIAPHRAGM: Primary | ICD-10-CM

## 2022-11-15 ENCOUNTER — TELEPHONE (OUTPATIENT)
Dept: ONCOLOGY | Facility: CLINIC | Age: 67
End: 2022-11-15

## 2022-11-15 NOTE — TELEPHONE ENCOUNTER
Caller: LIGIAZACH    Relationship: Emergency Contact    Best call back number: [phone]    What is the best time to reach you: ANYTIME    Who are you requesting to speak with (clinical staff, provider,  specific staff member): SCHEDULING    What was the call regarding: PLEASE CALL [facility] TO [facility] MISSED LAB APPT AND HIS 11/16 F/U APPT. THEY WOULD LIKE TO GET THE LAB 11/18 IN THE MORNING IF POSSIBLE.     Do you require a callback: YES

## 2022-11-18 ENCOUNTER — APPOINTMENT (OUTPATIENT)
Dept: CT IMAGING | Facility: HOSPITAL | Age: 67
End: 2022-11-18

## 2022-11-18 ENCOUNTER — LAB (OUTPATIENT)
Dept: LAB | Facility: HOSPITAL | Age: 67
End: 2022-11-18

## 2022-11-18 DIAGNOSIS — C92.10 CML (CHRONIC MYELOCYTIC LEUKEMIA): Primary | ICD-10-CM

## 2022-11-18 DIAGNOSIS — C92.10 CML (CHRONIC MYELOCYTIC LEUKEMIA): ICD-10-CM

## 2022-11-18 LAB
ALBUMIN SERPL-MCNC: 4.5 G/DL (ref 3.5–5.2)
ALBUMIN/GLOB SERPL: 1.6 G/DL
ALP SERPL-CCNC: 70 U/L (ref 39–117)
ALT SERPL W P-5'-P-CCNC: 38 U/L (ref 1–41)
ANION GAP SERPL CALCULATED.3IONS-SCNC: 8 MMOL/L (ref 5–15)
AST SERPL-CCNC: 38 U/L (ref 1–40)
BASOPHILS # BLD AUTO: 0.09 10*3/MM3 (ref 0–0.2)
BASOPHILS NFR BLD AUTO: 1 % (ref 0–1.5)
BILIRUB SERPL-MCNC: 0.9 MG/DL (ref 0–1.2)
BUN SERPL-MCNC: 9 MG/DL (ref 8–23)
BUN/CREAT SERPL: 9.9 (ref 7–25)
CALCIUM SPEC-SCNC: 9.1 MG/DL (ref 8.6–10.5)
CHLORIDE SERPL-SCNC: 101 MMOL/L (ref 98–107)
CO2 SERPL-SCNC: 30 MMOL/L (ref 22–29)
CREAT SERPL-MCNC: 0.91 MG/DL (ref 0.76–1.27)
DEPRECATED RDW RBC AUTO: 48.2 FL (ref 37–54)
EGFRCR SERPLBLD CKD-EPI 2021: 93.5 ML/MIN/1.73
EOSINOPHIL # BLD AUTO: 0.91 10*3/MM3 (ref 0–0.4)
EOSINOPHIL NFR BLD AUTO: 10.3 % (ref 0.3–6.2)
ERYTHROCYTE [DISTWIDTH] IN BLOOD BY AUTOMATED COUNT: 14.4 % (ref 12.3–15.4)
FERRITIN SERPL-MCNC: 68.8 NG/ML (ref 30–400)
GLOBULIN UR ELPH-MCNC: 2.8 GM/DL
GLUCOSE SERPL-MCNC: 114 MG/DL (ref 65–99)
HCT VFR BLD AUTO: 45.8 % (ref 37.5–51)
HGB BLD-MCNC: 14.9 G/DL (ref 13–17.7)
IMM GRANULOCYTES # BLD AUTO: 0.02 10*3/MM3 (ref 0–0.05)
IMM GRANULOCYTES NFR BLD AUTO: 0.2 % (ref 0–0.5)
IRON 24H UR-MRATE: 115 MCG/DL (ref 59–158)
IRON SATN MFR SERPL: 27 % (ref 20–50)
LYMPHOCYTES # BLD AUTO: 1.75 10*3/MM3 (ref 0.7–3.1)
LYMPHOCYTES NFR BLD AUTO: 19.9 % (ref 19.6–45.3)
MCH RBC QN AUTO: 29.7 PG (ref 26.6–33)
MCHC RBC AUTO-ENTMCNC: 32.5 G/DL (ref 31.5–35.7)
MCV RBC AUTO: 91.2 FL (ref 79–97)
MONOCYTES # BLD AUTO: 0.69 10*3/MM3 (ref 0.1–0.9)
MONOCYTES NFR BLD AUTO: 7.8 % (ref 5–12)
NEUTROPHILS NFR BLD AUTO: 5.34 10*3/MM3 (ref 1.7–7)
NEUTROPHILS NFR BLD AUTO: 60.8 % (ref 42.7–76)
NRBC BLD AUTO-RTO: 0 /100 WBC (ref 0–0.2)
PHOSPHATE SERPL-MCNC: 3 MG/DL (ref 2.5–4.5)
PLATELET # BLD AUTO: 224 10*3/MM3 (ref 140–450)
PMV BLD AUTO: 9.4 FL (ref 6–12)
POTASSIUM SERPL-SCNC: 4.2 MMOL/L (ref 3.5–5.2)
PROT SERPL-MCNC: 7.3 G/DL (ref 6–8.5)
RBC # BLD AUTO: 5.02 10*6/MM3 (ref 4.14–5.8)
SODIUM SERPL-SCNC: 139 MMOL/L (ref 136–145)
TIBC SERPL-MCNC: 425 MCG/DL (ref 298–536)
TRANSFERRIN SERPL-MCNC: 285 MG/DL (ref 200–360)
WBC NRBC COR # BLD: 8.8 10*3/MM3 (ref 3.4–10.8)

## 2022-11-18 PROCEDURE — 85025 COMPLETE CBC W/AUTO DIFF WBC: CPT

## 2022-11-18 PROCEDURE — 84100 ASSAY OF PHOSPHORUS: CPT | Performed by: INTERNAL MEDICINE

## 2022-11-18 PROCEDURE — 83540 ASSAY OF IRON: CPT

## 2022-11-18 PROCEDURE — 84466 ASSAY OF TRANSFERRIN: CPT

## 2022-11-18 PROCEDURE — 36415 COLL VENOUS BLD VENIPUNCTURE: CPT

## 2022-11-18 PROCEDURE — 80053 COMPREHEN METABOLIC PANEL: CPT

## 2022-11-18 PROCEDURE — 82728 ASSAY OF FERRITIN: CPT

## 2022-11-28 ENCOUNTER — TELEPHONE (OUTPATIENT)
Dept: ONCOLOGY | Facility: CLINIC | Age: 67
End: 2022-11-28

## 2022-11-28 DIAGNOSIS — C92.10 CML (CHRONIC MYELOCYTIC LEUKEMIA): Primary | ICD-10-CM

## 2022-11-28 LAB
REF LAB TEST METHOD: NORMAL
REF LAB TEST METHOD: NORMAL

## 2022-11-28 RX ORDER — IMATINIB MESYLATE 100 MG/1
300 TABLET, FILM COATED ORAL DAILY
Qty: 90 TABLET | Refills: 11 | Status: SHIPPED | OUTPATIENT
Start: 2022-11-28

## 2022-11-28 NOTE — TELEPHONE ENCOUNTER
Call from patient. He called the pharmacy for a refill of the Gleevec but the script had . He is needing a new script sent to the pharmacy.

## 2022-11-30 ENCOUNTER — PATIENT OUTREACH (OUTPATIENT)
Dept: ONCOLOGY | Facility: HOSPITAL | Age: 67
End: 2022-11-30

## 2022-11-30 NOTE — OUTREACH NOTE
[facility]    Oral Oncology Patient Outreach      Prescription Details  Consulting Provider:   Klever Ferreira MD ([facility]emat[facility] Oncology)   Medication and Regimen:  Gleevec 300 mg PO daily       An attempt was made by the Oral Oncology Clinic to contact this patient for a follow-up on their chemotherapy medication. The Oral Oncology Clinic can be reached at [phone].     Attempted to contact patient on 11/30/22 for follow-up, will try again tomorrow (12/1/22) before deferring out to next month.       Electronically signed 11/30/2022 15:50 CST by:  Kirsten Campos, PharmD  Specialty Pharmacist - Hematology & Oncology  [facility]  [phone]

## 2022-12-01 ENCOUNTER — HOSPITAL ENCOUNTER (OUTPATIENT)
Dept: CT IMAGING | Facility: HOSPITAL | Age: 67
Discharge: HOME | End: 2022-12-01
Admitting: NURSE PRACTITIONER

## 2022-12-01 ENCOUNTER — APPOINTMENT (OUTPATIENT)
Dept: CT IMAGING | Facility: HOSPITAL | Age: 67
End: 2022-12-01

## 2022-12-01 DIAGNOSIS — J98.6 DISORDERS OF DIAPHRAGM: ICD-10-CM

## 2022-12-01 PROCEDURE — 74150 CT ABDOMEN W/O CONTRAST: CPT

## 2022-12-02 ENCOUNTER — OFFICE VISIT (OUTPATIENT)
Dept: ONCOLOGY | Facility: CLINIC | Age: 67
End: 2022-12-02

## 2022-12-02 ENCOUNTER — SPECIALTY PHARMACY (OUTPATIENT)
Dept: ONCOLOGY | Facility: HOSPITAL | Age: 67
End: 2022-12-02

## 2022-12-02 VITALS
HEIGHT: 65 IN | BODY MASS INDEX: 24.81 KG/M2 | OXYGEN SATURATION: 98 % | WEIGHT: 148.9 LBS | HEART RATE: 78 BPM | RESPIRATION RATE: 18 BRPM | TEMPERATURE: 97.6 F | SYSTOLIC BLOOD PRESSURE: 138 MMHG | DIASTOLIC BLOOD PRESSURE: 92 MMHG

## 2022-12-02 DIAGNOSIS — C92.10 CML (CHRONIC MYELOCYTIC LEUKEMIA): Primary | ICD-10-CM

## 2022-12-02 PROCEDURE — 99215 OFFICE O/P EST HI 40 MIN: CPT | Performed by: INTERNAL MEDICINE

## 2022-12-02 RX ORDER — HYDROCODONE BITARTRATE AND ACETAMINOPHEN 7.5; 325 MG/1; MG/1
TABLET ORAL
COMMUNITY
Start: 2022-11-21

## 2022-12-02 NOTE — PROGRESS NOTES
[facility]    Oral Oncology Patient Outreach      Prescription Details  Consulting Provider:   Klever Ferreira MD ([facility] [place] [facility])   Medication and Regimen:  Gleevec 300 mg PO daily       An attempt was made by the Oral Oncology Clinic to contact this patient for a follow-up on their chemotherapy medication. The Oral Oncology Clinic can be reached at [phone].        Electronically signed 12/02/2022 16:55 CST by:  Gerardo Agrawal, PharmD  Specialty Pharmacist - Hematology & Oncology  [facility]  [phone]

## 2022-12-06 NOTE — PROGRESS NOTES
[facility]    Oral Oncology Patient Outreach      Prescription Details  Consulting Provider:   Klever Ferreira MD ([facility] [place] [facility])   Medication and Regimen:  Gleevec 300 mg PO daily       An attempt was made by the Oral Oncology Clinic to contact this patient for a follow-up on their chemotherapy medication. The Oral Oncology Clinic can be reached at [phone].     Attempted to contact patient for follow-up on 11/30/22 and 12/2/22, will defer patient out to 12/28/22 for next telephone consultation.       Electronically signed 12/02/2022 13:25 CST by:  Kirsten Campos, PharmD  Specialty Pharmacist - Hematology & Oncology  [facility]  [phone]

## 2022-12-20 ENCOUNTER — SPECIALTY PHARMACY (OUTPATIENT)
Dept: ONCOLOGY | Facility: HOSPITAL | Age: 67
End: 2022-12-20

## 2022-12-20 NOTE — PROGRESS NOTES
"[facility]    Oral Oncology Patient Follow-Up      Consult Details  Consulting Provider:   Klever Ferreira MD ([facility])   Medication and Regimen:  Gleevec 300 mg PO daily     Prescription Review  Dosing & Interactions:   Reviewed, appropriate and no significant interaction noted at this time.   Current Specialty Pharmacy [facility] - [place], [place] - [address] [facility] - [phone]  - [phone] FX        Intervention(s):                  Spoke with Susana (patient's spouse) for patient's monthly routine telephone consultation follow-up regarding the oral oncology medication. She stated that he has been tolerating the Gllevec well - \"everything is good\" -  and has not had any obvious side effects from the medication. He has not missed any doses of the medication in the last month that she is aware of. The patient still receives refills through [facility], and has not had any delays in getting those refills. Also, no new refills are needed at this time according to the patient. The patient has no changes to his maintenance medications and no new allergies that she is aware of.   Finally, the patient has not had any recent stays in the hospital and has not visited the ER in the last month due to the Gleevec. I encouraged the patient to reach out anytime with any questions or concerns they might have regarding their oral chemotherapy.     Summary:    No needs expressed by the patient or otherwise identified. Will follow-up next month regarding the patient’s oral chemotherapy with a routine telephone consultation. The next routine follow-up will be scheduled approximately 28-30 days from today.       Electronically signed 12/20/2022 13:25 CST by:  Kirsten Campos PharmD  Specialty Pharmacist - Hematology & Oncology  [facility]  [phone]    "
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
## 2023-01-17 ENCOUNTER — OFFICE VISIT (OUTPATIENT)
Dept: PULMONOLOGY | Facility: CLINIC | Age: 68
End: 2023-01-17
Payer: MEDICARE

## 2023-01-17 VITALS
WEIGHT: 146.6 LBS | BODY MASS INDEX: 24.43 KG/M2 | OXYGEN SATURATION: 96 % | DIASTOLIC BLOOD PRESSURE: 70 MMHG | HEIGHT: 65 IN | HEART RATE: 70 BPM | SYSTOLIC BLOOD PRESSURE: 132 MMHG

## 2023-01-17 DIAGNOSIS — R06.02 SHORTNESS OF BREATH: ICD-10-CM

## 2023-01-17 DIAGNOSIS — J98.6 ELEVATED DIAPHRAGM: Primary | ICD-10-CM

## 2023-01-17 PROCEDURE — 99213 OFFICE O/P EST LOW 20 MIN: CPT | Performed by: NURSE PRACTITIONER

## 2023-01-17 NOTE — PROCEDURES
Walking Oximetry  Performed by: Corine Yanez APRN  Authorized by: Corine Yanez APRN     Supplemental Oxygen: None  Rest room air SAT %:  96  Exercise room air SAT %:  94

## 2023-01-20 ENCOUNTER — SPECIALTY PHARMACY (OUTPATIENT)
Dept: ONCOLOGY | Facility: HOSPITAL | Age: 68
End: 2023-01-20
Payer: MEDICARE

## 2023-01-20 NOTE — PROGRESS NOTES
"[facility]    Oral Oncology Patient Follow-Up      Consult Details  Consulting Provider:   Klever Ferreira MD ([facility] [place] [facility] On[facility])   Medication and Regimen:  Gleevec 300 mg PO daily     Prescription Review  Dosing & Interactions:   Reviewed, appropriate and no significant interaction noted at this time.   Current Specialty Pharmacy [facility] - [place], [place] - [address] [facility] - [phone]  - [phone] FX        Intervention(s):                  Spoke with Susana (patient's spouse) for patient's monthly routine telephone consultation follow-up regarding the oral oncology medication. She stated that he has been tolerating the Gleevec well - \"everything is fine, all the same\" -  and has not had any obvious side effects from the medication. He has not missed any doses of the medication in the last month that she is aware of. The patient still receives refills through [facility], and has not had any delays in getting those refills. Also, no new refills are needed at this time according to the patient. The patient has no changes to his maintenance medications and no new allergies that she is aware of.   Finally, the patient has not had any recent stays in the hospital and has not visited the ER in the last month due to the Gleevec. I encouraged the patient to reach out anytime with any questions or concerns they might have regarding their oral chemotherapy.     Summary:    No needs expressed by the patient or otherwise identified. Will follow-up next month regarding the patient’s oral chemotherapy with a routine telephone consultation. The next routine follow-up will be scheduled approximately 28-30 days from today.       Electronically signed 01/20/2023 14:31 CST by:  Kirsten Campos PharmD  Specialty Pharmacist - Hematology & Oncology  [facility]  [phone]    "

## 2023-02-02 ENCOUNTER — HOSPITAL ENCOUNTER (OUTPATIENT)
Dept: CT IMAGING | Facility: HOSPITAL | Age: 68
Discharge: HOME | End: 2023-02-02
Payer: MEDICARE

## 2023-02-07 ENCOUNTER — DOCUMENTATION (OUTPATIENT)
Dept: CT IMAGING | Facility: HOSPITAL | Age: 68
End: 2023-02-07
Payer: MEDICARE

## 2023-02-22 ENCOUNTER — SPECIALTY PHARMACY (OUTPATIENT)
Dept: ONCOLOGY | Facility: HOSPITAL | Age: 68
End: 2023-02-22
Payer: MEDICARE

## 2023-02-22 NOTE — PROGRESS NOTES
"Specialty Pharmacy Refill Coordination Note    Spoke with Mrs. Curry for Mr. Curry monthly telephone follow-up regarding the oral oncology medication. She stated that he has been tolerating the Gleevec well and has not had any obvious side effects from the medication. \"No changes to anything, he is doing good\".  He has not missed any doses of the medication in the last month.       The patient still receives refills through [facility] , and has not had any delays in getting those refills. Also, no new refills are needed at this time according to the patient. The patient also stated that there has been no changes to his maintenance medications and no new drug allergies that she is aware of.      Finally, the patient stated he has not had any recent stays in the hospital and has not visited the ER in the last month due to the Gleevec . I encouraged the patient to reach out anytime with any questions or concerns they might have regarding their oral chemotherapy medication.     No needs expressed by the patient or otherwise identified. Will follow-up next month regarding the patient’s oral chemotherapy with a routine telephone consultation.       Refill Questions    Flowsheet Row Most Recent Value   Changes to allergies? No   Changes to medications? No   New conditions since last clinic visit No   Unplanned office visit, urgent care, ED, or hospital admission in the last 4 weeks  No   How does patient/caregiver feel medication is working? Good   Financial problems or insurance changes  No   Since the previous refill, were any specialty medication doses or scheduled injections missed or delayed?  No   Does this patient require a clinical escalation to a pharmacist? No                     Follow-up: 28-30  day(s)     Neena Turner, Pharmacy Technician  Specialty Pharmacy Technician      "

## 2023-03-13 NOTE — PROGRESS NOTES
Chief Complaint  Shortness of Breath (2 month F/U) and Elevated diaphragm    Subjective    History of Present Illness {CC  Problem List  Visit Diagnosis   Encounters  Notes  Medications  Labs  Result Review Imaging  Media     Cayetano Curry presents to [facility] for:    History of Present Illness  Mr. Curry is here for follow-up and management of right hemidiaphragm elevation with associated atelectasis.  He tells me he has been doing well since last office visit.  He notices intermittent dyspnea with heavy exertion.  Dyspnea is not severely interrupting his daily activities.  He has incentive spirometer that he has not been utilizing.  He has not been sick since last office visit.  He is vaccine hesitant.       Prior to Admission medications    Medication Sig Start Date End Date Taking? Authorizing Provider   citalopram (CeleXA) 40 MG tablet Take 40 mg by mouth Daily. 5/17/19   Kelsey Champion MD   fluticasone (FLONASE) 50 MCG/ACT nasal spray 2 sprays into the nostril(s) as directed by provider Daily. 4/4/19   Kelsey Champion MD   HYDROcodone-acetaminophen (NORCO) 7.5-325 MG per tablet  11/21/22   Kelsey Champion MD   imatinib (GLEEVEC) 100 MG chemo tablet TAKE 3 TABLETS (300MG) BY MOUTH ONCE DAILY AT THE SAME TIME WITH FOOD AND A LARGE GLASS OF WATER. AVOID GRAPEFRUIT PRODUCTS. 11/23/21   Klever Ferreira MD   imatinib (GLEEVEC) 100 MG chemo tablet Take 3 tablets by mouth Daily. 11/28/22   Klever Ferreira MD   Miracle mouthwash oral suspension Swish and spit 10 mL Every 4 (Four) Hours As Needed for Mucositis. 3/29/21   Klever Ferreira MD   omeprazole (priLOSEC) 40 MG capsule Take 40 mg by mouth Daily. 5/17/19   Kelsey Champion MD       Social History     Socioeconomic History   • Marital status:    Tobacco Use   • Smoking status: Former     Packs/day: 2.00     Years: 5.00     Pack years: 10.00     Types:  "Cigarettes     Start date:      Quit date: 1975     Years since quittin.7   • Smokeless tobacco: Never   Vaping Use   • Vaping Use: Never used   Substance and Sexual Activity   • Alcohol use: Yes     Comment: Occasionally    • Drug use: No   • Sexual activity: Defer       Objective   Vital Signs:   /82   Pulse 88   Ht 158.8 cm (62.5\")   Wt 68 kg (150 lb)   SpO2 98%   BMI 27.00 kg/m²     Physical Exam  Constitutional:       General: He is not in acute distress.     Interventions: Face mask in place.   HENT:      Head: Normocephalic.      Nose: Nose normal.      Mouth/Throat:      Mouth: Mucous membranes are moist.   Eyes:      General: No scleral icterus.  Cardiovascular:      Rate and Rhythm: Normal rate.   Pulmonary:      Effort: No respiratory distress.   Abdominal:      General: There is no distension.   Neurological:      Mental Status: He is alert and oriented to person, place, and time.   Psychiatric:         Mood and Affect: Mood normal.         Behavior: Behavior is cooperative.        Result Review :{ Labs  Result Review  Imaging  Med Tab  Media :    PFT Values        Some values may be hidden. Unless noted otherwise, only the newest values recorded on each date are displayed.         Old Values PFT Results 3/14/23   No data to display.      Pre Drug PFT Results 3/14/23   FVC 75   FEV1 74   FEF 25-75% 75   FEV1/FVC 77      Post Drug PFT Results 3/14/23   No data to display.      Other Tests PFT Results 3/14/23   DLCO 87   D/VAsb 109               Results for orders placed in visit on 23    Pulmonary Function Test    Narrative  Pulmonary Function Test  Performed by: Katie Ortiz, RRT  Authorized by: Corine Yanez, APRN    Pre Drug % Predicted  FVC: 75%  FEV1: 74%  FEF 25-75%: 75%  FEV1/FVC: 77%  DLCO: 87%  D/VAsb: 109%    Interpretation  Spirometry  Spirometry shows mild restriction. midflow is normal.  Review of FVL curve  Patient's effort is normal.  Diffusion " Capacity  The patient's diffusion capacity is normal.  Diffusion capacity is normal when corrected for alveolar volume.    Rest/Exercise Pulse Ox Values        Some values may be hidden. Unless noted otherwise, only the newest values recorded on each date are displayed.         Rest/Exercise Pulse Ox Results 1/17/23   Rest room air SAT % 96   Exercise room air SAT % 94                   CT Chest Without Contrast (03/14/2023 12:27)      My interpretation of imaging: Right hemidiaphragm elevation with associated atelectasis, calcified granulomas        Assessment and Plan {CC Problem List  Visit Diagnosis  ROS  Review (Popup)  [facility] Maintenance  Quality  BestPractice  Medications  SmartSets  SnapShot Encounters  Media      Diagnoses and all orders for this visit:    1. Restrictive lung disease (Primary)    2. Shortness of breath  -     Pulmonary Function Test  -     XR Chest 2 View; Future    3. Elevated diaphragm  -     XR Chest 2 View; Future        BMI is within normal parameters. No other follow-up for BMI required.      We reviewed spirometry was was consistent with mild restriction.  DLCO within normal limits.  No need for inhaler therapy at this time.  We reviewed CT images.  Given minimal pulmonary symptoms no need for additional work-up at this time.  Follow-up in 1 year.  Call in the interim with issues.    Corine Yanez, APRN  3/14/2023  16:43 CDT    Follow Up {Instructions Charge Capture  Follow-up Communications   Return in about 1 year (around 3/14/2024).    Patient was given instructions and counseling regarding his condition or for health maintenance advice. Please see specific information pulled into the AVS if appropriate.

## 2023-03-14 ENCOUNTER — OFFICE VISIT (OUTPATIENT)
Dept: PULMONOLOGY | Facility: CLINIC | Age: 68
End: 2023-03-14
Payer: MEDICARE

## 2023-03-14 ENCOUNTER — HOSPITAL ENCOUNTER (OUTPATIENT)
Dept: CT IMAGING | Facility: HOSPITAL | Age: 68
Discharge: HOME | End: 2023-03-14
Admitting: NURSE PRACTITIONER
Payer: MEDICARE

## 2023-03-14 ENCOUNTER — PROCEDURE VISIT (OUTPATIENT)
Dept: PULMONOLOGY | Facility: CLINIC | Age: 68
End: 2023-03-14
Payer: MEDICARE

## 2023-03-14 VITALS
SYSTOLIC BLOOD PRESSURE: 138 MMHG | BODY MASS INDEX: 26.58 KG/M2 | OXYGEN SATURATION: 98 % | HEIGHT: 63 IN | DIASTOLIC BLOOD PRESSURE: 82 MMHG | HEART RATE: 88 BPM | WEIGHT: 150 LBS

## 2023-03-14 DIAGNOSIS — J98.6 ELEVATED DIAPHRAGM: ICD-10-CM

## 2023-03-14 DIAGNOSIS — J98.4 RESTRICTIVE LUNG DISEASE: Primary | ICD-10-CM

## 2023-03-14 DIAGNOSIS — R06.02 SHORTNESS OF BREATH: Primary | ICD-10-CM

## 2023-03-14 DIAGNOSIS — R06.02 SHORTNESS OF BREATH: ICD-10-CM

## 2023-03-14 PROCEDURE — 1159F MED LIST DOCD IN RCRD: CPT | Performed by: NURSE PRACTITIONER

## 2023-03-14 PROCEDURE — 1160F RVW MEDS BY RX/DR IN RCRD: CPT | Performed by: NURSE PRACTITIONER

## 2023-03-14 PROCEDURE — 99213 OFFICE O/P EST LOW 20 MIN: CPT | Performed by: NURSE PRACTITIONER

## 2023-03-14 PROCEDURE — 94375 RESPIRATORY FLOW VOLUME LOOP: CPT | Performed by: NURSE PRACTITIONER

## 2023-03-14 PROCEDURE — 94729 DIFFUSING CAPACITY: CPT | Performed by: NURSE PRACTITIONER

## 2023-03-14 PROCEDURE — 71250 CT THORAX DX C-: CPT

## 2023-03-15 ENCOUNTER — SPECIALTY PHARMACY (OUTPATIENT)
Dept: ONCOLOGY | Facility: HOSPITAL | Age: 68
End: 2023-03-15
Payer: MEDICARE

## 2023-03-15 NOTE — PROGRESS NOTES
"Specialty Pharmacy Refill Coordination Note     Spoke with Susana (spouse) for Mr. Curry monthly telephone follow-up regarding the oral oncology medication. She stated that he has been tolerating the Gleevec well and has not had any obvious side effects from the medication. \"He's doing fine, just about the same\" He has not missed any doses of the medication in the last month.       The [facility] , and has not had any delays in getting those refills. Also, no new refills are needed at this time according to the patient. The patient also stated that there has been no changes to his maintenance medications and no new drug allergies that she is aware of. \"They are all the same\"      Finally, the patient stated he has not had any recent stays in the hospital and has not visited the ER in the last month due to the Gleevec . I encouraged the patient to reach out anytime with any questions or concerns they might have regarding their oral chemotherapy medication.     No needs expressed by the patient or otherwise identified. Will follow-up next month regarding the patient’s oral chemotherapy with a routine telephone consultation.        Refill Questions    Flowsheet Row Most Recent Value   Changes to allergies? No   Changes to medications? No   New conditions since last clinic visit No   Unplanned office visit, urgent care, ED, or hospital admission in the last 4 weeks  No   How does patient/caregiver feel medication is working? Good   Financial problems or insurance changes  No   Since the previous refill, were any specialty medication doses or scheduled injections missed or delayed?  No   Does this patient require a clinical escalation to a pharmacist? No                     Follow-up: 28-30 day(s)     Neena Turner, Pharmacy Technician  Specialty Pharmacy Technician      "

## 2023-03-22 ENCOUNTER — TELEPHONE (OUTPATIENT)
Dept: ONCOLOGY | Facility: CLINIC | Age: 68
End: 2023-03-22
Payer: MEDICARE

## 2023-03-22 NOTE — TELEPHONE ENCOUNTER
Caller: Cayetano Curry    Relationship: Self    Best call back number: [phone]  What is the best time to reach you: ANYTIME. LEAVE VM IFNEEDED    Who are you requesting to speak with (clinical staff, provider,  specific staff member): INSURANCE COPAY INQUIRY  / NON CLNIICAL    What was the call regarding:FAB CAYETANO CALLED IN AND STATED THAT HIS COPAY WITH HIS INSURANCE HAS GONE FROM $55 A VIST TO NOW OVER $350 PER VISIT. HE WAS CALLING IN TO GIVE DR RAMOS HIS INSURANCE FAX # [phone] AND REFERENCE # [id number] SO THAT HE CAN GET HIS COPAYS LOWERED. PLEASE CALL BACK TO DISCUSS.    Do you require a callback: YES

## 2023-03-23 NOTE — TELEPHONE ENCOUNTER
Called patient back and gave him the [employer] number to call them. Informed that we don't deal with the insurance/copays in office.

## 2023-03-27 ENCOUNTER — DOCUMENTATION (OUTPATIENT)
Dept: ONCOLOGY | Facility: HOSPITAL | Age: 68
End: 2023-03-27
Payer: MEDICARE

## 2023-03-27 NOTE — PROGRESS NOTES
Specialty Pharmacy Refill Coordination Note     I had received a fax stating that Mr. Patricio Julio would require a PA and that the current PA submitted had been denied. He has been on this current medication for several months. I filed a 2nd level appeal and got another denial. I then called the rejection number for more information considering I did not submit the original PA request. I was then informed that the PA that was filed was for a cost reduction and that it was denied due to the drug being a specialty drug. She states that it is still covered and the patient will continue to have a $100 copay when his refill is due in April. I'm not sure where the miscommunication happened but this patient's medication has a PA approved indefinitely.      Neena Turner, Pharmacy Technician  Specialty Pharmacy Technician

## 2023-04-11 ENCOUNTER — SPECIALTY PHARMACY (OUTPATIENT)
Dept: ONCOLOGY | Facility: HOSPITAL | Age: 68
End: 2023-04-11
Payer: MEDICARE

## 2023-04-11 ENCOUNTER — TELEPHONE (OUTPATIENT)
Dept: ONCOLOGY | Facility: CLINIC | Age: 68
End: 2023-04-11

## 2023-04-11 NOTE — TELEPHONE ENCOUNTER
Caller: Cayetano Curry    Relationship: Self    Best call back number: [phone] (CAN TALK TO WIFE, ZACH, IF SHE ANSWERS)    What is the best time to reach you: ANYTIME    Who are you requesting to speak with (clinical staff, provider, specific staff member): CLINICAL TEAM    What was the call regarding: PT STATED THAT HE RECVD A LETTER STATING THAT HIS IMATINIB MESYLATE WILL NO LONGER BE COVERED THROUGH HIS INSURANCE. PLEASE CALL TO ADVISE IF THERE IS ANYTHING THAT CAN BE DONE ABOUT THIS.     Do you require a callback: YES

## 2023-04-11 NOTE — PROGRESS NOTES
Specialty Pharmacy Refill Coordination Note     Patient Outreach  An attempt was made by the [facility] to contact this patient for a follow-up on their chemotherapy medication. The [facility] can be reached at [phone].         Neena Turner, Pharmacy Technician  Specialty Pharmacy Technician

## 2023-04-12 ENCOUNTER — SPECIALTY PHARMACY (OUTPATIENT)
Dept: ONCOLOGY | Facility: HOSPITAL | Age: 68
End: 2023-04-12
Payer: MEDICARE

## 2023-04-12 NOTE — PROGRESS NOTES
Specialty Pharmacy Refill Coordination Note     Patient Outreach  An attempt was made by the [facility] to contact this patient for a follow-up on their chemotherapy medication. The [facility] can be reached at [phone].     Also trying to reach patient for income and insurance information for [employer] assistance form.      Neena Turner, Pharmacy Technician  Specialty Pharmacy Technician

## 2023-05-06 ENCOUNTER — APPOINTMENT (OUTPATIENT)
Dept: CT IMAGING | Age: 68
DRG: 481 | End: 2023-05-06
Payer: MEDICARE

## 2023-05-06 ENCOUNTER — HOSPITAL ENCOUNTER (INPATIENT)
Age: 68
LOS: 3 days | Discharge: HOME HEALTH | DRG: 481 | End: 2023-05-09
Attending: EMERGENCY MEDICINE | Admitting: SURGERY
Payer: MEDICARE

## 2023-05-06 ENCOUNTER — APPOINTMENT (OUTPATIENT)
Dept: GENERAL RADIOLOGY | Age: 68
DRG: 481 | End: 2023-05-06
Payer: MEDICARE

## 2023-05-06 DIAGNOSIS — S32.010A CLOSED COMPRESSION FRACTURE OF L1 VERTEBRA, INITIAL ENCOUNTER (HCC): ICD-10-CM

## 2023-05-06 DIAGNOSIS — S01.81XA FACIAL LACERATION, INITIAL ENCOUNTER: ICD-10-CM

## 2023-05-06 DIAGNOSIS — S51.812A SKIN TEAR OF LEFT FOREARM WITHOUT COMPLICATION, INITIAL ENCOUNTER: ICD-10-CM

## 2023-05-06 DIAGNOSIS — S72.142A CLOSED INTERTROCHANTERIC FRACTURE OF HIP, LEFT, INITIAL ENCOUNTER (HCC): ICD-10-CM

## 2023-05-06 DIAGNOSIS — E87.6 HYPOKALEMIA: ICD-10-CM
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
## 2023-05-06 DIAGNOSIS — S72.142A CLOSED DISPLACED INTERTROCHANTERIC FRACTURE OF LEFT FEMUR, INITIAL ENCOUNTER (HCC): ICD-10-CM

## 2023-05-06 LAB
ALBUMIN SERPL-MCNC: 3.5 G/DL (ref 3.5–5.2)
ALP SERPL-CCNC: 75 U/L (ref 40–130)
ALT SERPL-CCNC: 38 U/L (ref 5–41)
ANION GAP SERPL CALCULATED.3IONS-SCNC: 14 MMOL/L (ref 7–19)
AST SERPL-CCNC: 49 U/L (ref 5–40)
BILIRUB SERPL-MCNC: 0.7 MG/DL (ref 0.2–1.2)
BUN SERPL-MCNC: 10 MG/DL (ref 8–23)
CALCIUM SERPL-MCNC: 7.9 MG/DL (ref 8.8–10.2)
CHLORIDE SERPL-SCNC: 98 MMOL/L (ref 98–111)
CO2 SERPL-SCNC: 20 MMOL/L (ref 22–29)
CREAT SERPL-MCNC: 0.9 MG/DL (ref 0.5–1.2)
ERYTHROCYTE [DISTWIDTH] IN BLOOD BY AUTOMATED COUNT: 13.5 % (ref 11.5–14.5)
GLUCOSE SERPL-MCNC: 106 MG/DL (ref 74–109)
HCT VFR BLD AUTO: 44.4 % (ref 42–52)
HGB BLD-MCNC: 14.7 G/DL (ref 14–18)
LIPASE SERPL-CCNC: 18 U/L (ref 13–60)
MCH RBC QN AUTO: 30.8 PG (ref 27–31)
MCHC RBC AUTO-ENTMCNC: 33.1 G/DL (ref 33–37)
MCV RBC AUTO: 92.9 FL (ref 80–94)
PLATELET # BLD AUTO: 234 K/UL (ref 130–400)
PMV BLD AUTO: 9.6 FL (ref 9.4–12.4)
POTASSIUM SERPL-SCNC: 3.2 MMOL/L (ref 3.5–5)
PROT SERPL-MCNC: 5.7 G/DL (ref 6.6–8.7)
RBC # BLD AUTO: 4.78 M/UL (ref 4.7–6.1)
REASON FOR REJECTION: NORMAL
REJECTED TEST: NORMAL
SODIUM SERPL-SCNC: 132 MMOL/L (ref 136–145)
WBC # BLD AUTO: 9.5 K/UL (ref 4.8–10.8)

## 2023-05-06 PROCEDURE — 70450 CT HEAD/BRAIN W/O DYE: CPT

## 2023-05-06 PROCEDURE — 73502 X-RAY EXAM HIP UNI 2-3 VIEWS: CPT

## 2023-05-06 PROCEDURE — 99285 EMERGENCY DEPT VISIT HI MDM: CPT

## 2023-05-06 PROCEDURE — 6360000002 HC RX W HCPCS: Performed by: SURGERY

## 2023-05-06 PROCEDURE — 71260 CT THORAX DX C+: CPT

## 2023-05-06 PROCEDURE — 6360000004 HC RX CONTRAST MEDICATION: Performed by: EMERGENCY MEDICINE

## 2023-05-06 PROCEDURE — 12011 RPR F/E/E/N/L/M 2.5 CM/<: CPT

## 2023-05-06 PROCEDURE — 74177 CT ABD & PELVIS W/CONTRAST: CPT

## 2023-05-06 PROCEDURE — 1210000000 HC MED SURG R&B

## 2023-05-06 PROCEDURE — 36415 COLL VENOUS BLD VENIPUNCTURE: CPT

## 2023-05-06 PROCEDURE — 0HQ1XZZ REPAIR FACE SKIN, EXTERNAL APPROACH: ICD-10-PCS | Performed by: EMERGENCY MEDICINE

## 2023-05-06 PROCEDURE — 96374 THER/PROPH/DIAG INJ IV PUSH: CPT

## 2023-05-06 PROCEDURE — 72125 CT NECK SPINE W/O DYE: CPT

## 2023-05-06 PROCEDURE — 85027 COMPLETE CBC AUTOMATED: CPT

## 2023-05-06 PROCEDURE — 6370000000 HC RX 637 (ALT 250 FOR IP): Performed by: EMERGENCY MEDICINE

## 2023-05-06 PROCEDURE — 70486 CT MAXILLOFACIAL W/O DYE: CPT

## 2023-05-06 PROCEDURE — 80053 COMPREHEN METABOLIC PANEL: CPT

## 2023-05-06 PROCEDURE — 73130 X-RAY EXAM OF HAND: CPT

## 2023-05-06 PROCEDURE — 6360000002 HC RX W HCPCS: Performed by: EMERGENCY MEDICINE

## 2023-05-06 PROCEDURE — 83690 ASSAY OF LIPASE: CPT

## 2023-05-06 RX ORDER — MORPHINE SULFATE 2 MG/ML
2 INJECTION, SOLUTION INTRAMUSCULAR; INTRAVENOUS
Status: DISCONTINUED | OUTPATIENT
Start: 2023-05-06 | End: 2023-05-07

## 2023-05-06 RX ORDER — LIDOCAINE HYDROCHLORIDE 10 MG/ML
INJECTION, SOLUTION EPIDURAL; INFILTRATION; INTRACAUDAL; PERINEURAL
Status: DISPENSED
Start: 2023-05-06 | End: 2023-05-07

## 2023-05-06 RX ORDER — SODIUM CHLORIDE 0.9 % (FLUSH) 0.9 %
5-40 SYRINGE (ML) INJECTION EVERY 12 HOURS SCHEDULED
Status: DISCONTINUED | OUTPATIENT
Start: 2023-05-06 | End: 2023-05-07 | Stop reason: SDUPTHER

## 2023-05-06 RX ORDER — MAGNESIUM HYDROXIDE/ALUMINUM HYDROXICE/SIMETHICONE 120; 1200; 1200 MG/30ML; MG/30ML; MG/30ML
30 SUSPENSION ORAL ONCE
Status: COMPLETED | OUTPATIENT
Start: 2023-05-06 | End: 2023-05-06

## 2023-05-06 RX ORDER — SODIUM CHLORIDE 9 MG/ML
INJECTION, SOLUTION INTRAVENOUS PRN
Status: DISCONTINUED | OUTPATIENT
Start: 2023-05-06 | End: 2023-05-07 | Stop reason: SDUPTHER

## 2023-05-06 RX ORDER — ONDANSETRON 4 MG/1
4 TABLET, ORALLY DISINTEGRATING ORAL EVERY 8 HOURS PRN
Status: DISCONTINUED | OUTPATIENT
Start: 2023-05-06 | End: 2023-05-09 | Stop reason: HOSPADM

## 2023-05-06 RX ORDER — MORPHINE SULFATE 4 MG/ML
4 INJECTION, SOLUTION INTRAMUSCULAR; INTRAVENOUS ONCE
Status: COMPLETED | OUTPATIENT
Start: 2023-05-06 | End: 2023-05-06

## 2023-05-06 RX ORDER — ONDANSETRON 2 MG/ML
4 INJECTION INTRAMUSCULAR; INTRAVENOUS EVERY 6 HOURS PRN
Status: DISCONTINUED | OUTPATIENT
Start: 2023-05-06 | End: 2023-05-09 | Stop reason: HOSPADM

## 2023-05-06 RX ORDER — ACETAMINOPHEN 500 MG
1000 TABLET ORAL ONCE
Status: DISCONTINUED | OUTPATIENT
Start: 2023-05-07 | End: 2023-05-07 | Stop reason: HOSPADM

## 2023-05-06 RX ORDER — TRANEXAMIC ACID 650 MG/1
1950 TABLET ORAL
Status: DISCONTINUED | OUTPATIENT
Start: 2023-05-07 | End: 2023-05-07 | Stop reason: HOSPADM

## 2023-05-06 RX ORDER — SODIUM CHLORIDE 0.9 % (FLUSH) 0.9 %
5-40 SYRINGE (ML) INJECTION PRN
Status: DISCONTINUED | OUTPATIENT
Start: 2023-05-06 | End: 2023-05-07 | Stop reason: HOSPADM

## 2023-05-06 RX ORDER — MORPHINE SULFATE 4 MG/ML
4 INJECTION, SOLUTION INTRAMUSCULAR; INTRAVENOUS
Status: DISCONTINUED | OUTPATIENT
Start: 2023-05-06 | End: 2023-05-07

## 2023-05-06 RX ORDER — LIDOCAINE HYDROCHLORIDE 10 MG/ML
5 INJECTION, SOLUTION EPIDURAL; INFILTRATION; INTRACAUDAL; PERINEURAL ONCE
Status: DISCONTINUED | OUTPATIENT
Start: 2023-05-06 | End: 2023-05-09 | Stop reason: HOSPADM

## 2023-05-06 RX ORDER — SODIUM CHLORIDE 9 MG/ML
INJECTION, SOLUTION INTRAVENOUS PRN
Status: DISCONTINUED | OUTPATIENT
Start: 2023-05-06 | End: 2023-05-07 | Stop reason: HOSPADM

## 2023-05-06 RX ORDER — SODIUM CHLORIDE 0.9 % (FLUSH) 0.9 %
5-40 SYRINGE (ML) INJECTION PRN
Status: DISCONTINUED | OUTPATIENT
Start: 2023-05-06 | End: 2023-05-07 | Stop reason: SDUPTHER

## 2023-05-06 RX ORDER — DEXAMETHASONE SODIUM PHOSPHATE 10 MG/ML
8 INJECTION, SOLUTION INTRAMUSCULAR; INTRAVENOUS ONCE
Status: DISCONTINUED | OUTPATIENT
Start: 2023-05-07 | End: 2023-05-07 | Stop reason: HOSPADM

## 2023-05-06 RX ORDER — SODIUM CHLORIDE 0.9 % (FLUSH) 0.9 %
5-40 SYRINGE (ML) INJECTION EVERY 12 HOURS SCHEDULED
Status: DISCONTINUED | OUTPATIENT
Start: 2023-05-06 | End: 2023-05-07 | Stop reason: HOSPADM

## 2023-05-06 RX ORDER — CELECOXIB 100 MG/1
100 CAPSULE ORAL ONCE
Status: DISCONTINUED | OUTPATIENT
Start: 2023-05-07 | End: 2023-05-07 | Stop reason: HOSPADM

## 2023-05-06 RX ORDER — SODIUM CHLORIDE, SODIUM LACTATE, POTASSIUM CHLORIDE, CALCIUM CHLORIDE 600; 310; 30; 20 MG/100ML; MG/100ML; MG/100ML; MG/100ML
INJECTION, SOLUTION INTRAVENOUS CONTINUOUS
Status: DISCONTINUED | OUTPATIENT
Start: 2023-05-07 | End: 2023-05-07 | Stop reason: HOSPADM

## 2023-05-06 RX ORDER — POTASSIUM CHLORIDE 20 MEQ/1
20 TABLET, EXTENDED RELEASE ORAL ONCE
Status: COMPLETED | OUTPATIENT
Start: 2023-05-06 | End: 2023-05-06

## 2023-05-06 RX ADMIN — POTASSIUM CHLORIDE 20 MEQ: 1500 TABLET, EXTENDED RELEASE ORAL at 20:49

## 2023-05-06 RX ADMIN — MORPHINE SULFATE 4 MG: 4 INJECTION, SOLUTION INTRAMUSCULAR; INTRAVENOUS at 17:42

## 2023-05-06 RX ADMIN — ALUMINUM HYDROXIDE, MAGNESIUM HYDROXIDE, AND SIMETHICONE 30 ML: 200; 200; 20 SUSPENSION ORAL at 20:54

## 2023-05-06 RX ADMIN — IOPAMIDOL 70 ML: 755 INJECTION, SOLUTION INTRAVENOUS at 17:59

## 2023-05-06 RX ADMIN — MORPHINE SULFATE 4 MG: 4 INJECTION, SOLUTION INTRAMUSCULAR; INTRAVENOUS at 22:23

## 2023-05-06 ASSESSMENT — ENCOUNTER SYMPTOMS
DIARRHEA: 0
VOMITING: 0
EYE PAIN: 0
SHORTNESS OF BREATH: 0
BACK PAIN: 0
ABDOMINAL PAIN: 0

## 2023-05-06 ASSESSMENT — PAIN DESCRIPTION - DESCRIPTORS: DESCRIPTORS: ACHING

## 2023-05-06 ASSESSMENT — PAIN DESCRIPTION - ORIENTATION: ORIENTATION: LEFT

## 2023-05-06 ASSESSMENT — PAIN DESCRIPTION - LOCATION: LOCATION: HIP

## 2023-05-06 ASSESSMENT — PAIN SCALES - GENERAL: PAINLEVEL_OUTOF10: 10

## 2023-05-07 ENCOUNTER — APPOINTMENT (OUTPATIENT)
Dept: GENERAL RADIOLOGY | Age: 68
DRG: 481 | End: 2023-05-07
Payer: MEDICARE

## 2023-05-07 ENCOUNTER — ANESTHESIA (OUTPATIENT)
Dept: OPERATING ROOM | Age: 68
End: 2023-05-07
Payer: MEDICARE

## 2023-05-07 ENCOUNTER — ANESTHESIA EVENT (OUTPATIENT)
Dept: OPERATING ROOM | Age: 68
End: 2023-05-07
Payer: MEDICARE

## 2023-05-07 LAB
ANION GAP SERPL CALCULATED.3IONS-SCNC: 12 MMOL/L (ref 7–19)
BUN SERPL-MCNC: 9 MG/DL (ref 8–23)
CALCIUM SERPL-MCNC: 8.9 MG/DL (ref 8.8–10.2)
CHLORIDE SERPL-SCNC: 99 MMOL/L (ref 98–111)
CO2 SERPL-SCNC: 25 MMOL/L (ref 22–29)
CREAT SERPL-MCNC: 0.9 MG/DL (ref 0.5–1.2)
GLUCOSE SERPL-MCNC: 132 MG/DL (ref 74–109)
POTASSIUM SERPL-SCNC: 4.4 MMOL/L (ref 3.5–5)
SODIUM SERPL-SCNC: 136 MMOL/L (ref 136–145)

## 2023-05-07 PROCEDURE — 6360000002 HC RX W HCPCS: Performed by: SURGERY

## 2023-05-07 PROCEDURE — C1769 GUIDE WIRE: HCPCS | Performed by: ORTHOPAEDIC SURGERY

## 2023-05-07 PROCEDURE — 6360000002 HC RX W HCPCS: Performed by: ORTHOPAEDIC SURGERY

## 2023-05-07 PROCEDURE — 6370000000 HC RX 637 (ALT 250 FOR IP): Performed by: ORTHOPAEDIC SURGERY

## 2023-05-07 PROCEDURE — 7100000001 HC PACU RECOVERY - ADDTL 15 MIN: Performed by: ORTHOPAEDIC SURGERY

## 2023-05-07 PROCEDURE — 73552 X-RAY EXAM OF FEMUR 2/>: CPT

## 2023-05-07 PROCEDURE — 80048 BASIC METABOLIC PNL TOTAL CA: CPT

## 2023-05-07 PROCEDURE — 94760 N-INVAS EAR/PLS OXIMETRY 1: CPT

## 2023-05-07 PROCEDURE — 6360000002 HC RX W HCPCS: Performed by: ANESTHESIOLOGY

## 2023-05-07 PROCEDURE — 2580000003 HC RX 258: Performed by: ANESTHESIOLOGY

## 2023-05-07 PROCEDURE — 0QS704Z REPOSITION LEFT UPPER FEMUR WITH INTERNAL FIXATION DEVICE, OPEN APPROACH: ICD-10-PCS | Performed by: ORTHOPAEDIC SURGERY

## 2023-05-07 PROCEDURE — 3600000014 HC SURGERY LEVEL 4 ADDTL 15MIN: Performed by: ORTHOPAEDIC SURGERY

## 2023-05-07 PROCEDURE — C1776 JOINT DEVICE (IMPLANTABLE): HCPCS | Performed by: ORTHOPAEDIC SURGERY

## 2023-05-07 PROCEDURE — 3700000000 HC ANESTHESIA ATTENDED CARE: Performed by: ORTHOPAEDIC SURGERY

## 2023-05-07 PROCEDURE — 2709999900 HC NON-CHARGEABLE SUPPLY: Performed by: ORTHOPAEDIC SURGERY

## 2023-05-07 PROCEDURE — 94150 VITAL CAPACITY TEST: CPT

## 2023-05-07 PROCEDURE — 3700000001 HC ADD 15 MINUTES (ANESTHESIA): Performed by: ORTHOPAEDIC SURGERY

## 2023-05-07 PROCEDURE — 2500000003 HC RX 250 WO HCPCS: Performed by: ANESTHESIOLOGY

## 2023-05-07 PROCEDURE — 6360000002 HC RX W HCPCS

## 2023-05-07 PROCEDURE — 36415 COLL VENOUS BLD VENIPUNCTURE: CPT

## 2023-05-07 PROCEDURE — 73502 X-RAY EXAM HIP UNI 2-3 VIEWS: CPT

## 2023-05-07 PROCEDURE — 2580000003 HC RX 258: Performed by: ORTHOPAEDIC SURGERY

## 2023-05-07 PROCEDURE — 7100000000 HC PACU RECOVERY - FIRST 15 MIN: Performed by: ORTHOPAEDIC SURGERY

## 2023-05-07 PROCEDURE — 1210000000 HC MED SURG R&B

## 2023-05-07 PROCEDURE — 3600000004 HC SURGERY LEVEL 4 BASE: Performed by: ORTHOPAEDIC SURGERY

## 2023-05-07 PROCEDURE — C1713 ANCHOR/SCREW BN/BN,TIS/BN: HCPCS | Performed by: ORTHOPAEDIC SURGERY

## 2023-05-07 PROCEDURE — 99222 1ST HOSP IP/OBS MODERATE 55: CPT | Performed by: SURGERY

## 2023-05-07 DEVICE — IMPLANTABLE DEVICE: Type: IMPLANTABLE DEVICE | Site: HIP | Status: FUNCTIONAL

## 2023-05-07 DEVICE — SCREW BNE L40MM DIA4.5MM PROX CORT TIB S STL ST LOK FULL: Type: IMPLANTABLE DEVICE | Site: HIP | Status: FUNCTIONAL

## 2023-05-07 DEVICE — SCREW BNE L38MM DIA4.5MM PROX CORT TIB S STL ST LOK FULL: Type: IMPLANTABLE DEVICE | Site: HIP | Status: FUNCTIONAL

## 2023-05-07 RX ORDER — ACETAMINOPHEN 325 MG/1
650 TABLET ORAL EVERY 6 HOURS
Status: DISCONTINUED | OUTPATIENT
Start: 2023-05-07 | End: 2023-05-09 | Stop reason: HOSPADM

## 2023-05-07 RX ORDER — CEFAZOLIN SODIUM 1 G/3ML
INJECTION, POWDER, FOR SOLUTION INTRAMUSCULAR; INTRAVENOUS PRN
Status: DISCONTINUED | OUTPATIENT
Start: 2023-05-07 | End: 2023-05-07 | Stop reason: SDUPTHER

## 2023-05-07 RX ORDER — SODIUM CHLORIDE 0.9 % (FLUSH) 0.9 %
5-40 SYRINGE (ML) INJECTION PRN
Status: DISCONTINUED | OUTPATIENT
Start: 2023-05-07 | End: 2023-05-09 | Stop reason: HOSPADM

## 2023-05-07 RX ORDER — HYDROMORPHONE HYDROCHLORIDE 1 MG/ML
0.25 INJECTION, SOLUTION INTRAMUSCULAR; INTRAVENOUS; SUBCUTANEOUS EVERY 5 MIN PRN
Status: DISCONTINUED | OUTPATIENT
Start: 2023-05-07 | End: 2023-05-07 | Stop reason: HOSPADM

## 2023-05-07 RX ORDER — SODIUM CHLORIDE, SODIUM LACTATE, POTASSIUM CHLORIDE, CALCIUM CHLORIDE 600; 310; 30; 20 MG/100ML; MG/100ML; MG/100ML; MG/100ML
INJECTION, SOLUTION INTRAVENOUS CONTINUOUS
Status: DISCONTINUED | OUTPATIENT
Start: 2023-05-07 | End: 2023-05-09 | Stop reason: HOSPADM

## 2023-05-07 RX ORDER — ROPIVACAINE HYDROCHLORIDE 2 MG/ML
INJECTION, SOLUTION EPIDURAL; INFILTRATION; PERINEURAL PRN
Status: DISCONTINUED | OUTPATIENT
Start: 2023-05-07 | End: 2023-05-07 | Stop reason: ALTCHOICE

## 2023-05-07 RX ORDER — SODIUM CHLORIDE, SODIUM LACTATE, POTASSIUM CHLORIDE, CALCIUM CHLORIDE 600; 310; 30; 20 MG/100ML; MG/100ML; MG/100ML; MG/100ML
INJECTION, SOLUTION INTRAVENOUS CONTINUOUS PRN
Status: DISCONTINUED | OUTPATIENT
Start: 2023-05-07 | End: 2023-05-07 | Stop reason: SDUPTHER

## 2023-05-07 RX ORDER — OXYCODONE HYDROCHLORIDE 5 MG/1
5 TABLET ORAL EVERY 4 HOURS PRN
Status: DISCONTINUED | OUTPATIENT
Start: 2023-05-07 | End: 2023-05-09 | Stop reason: HOSPADM

## 2023-05-07 RX ORDER — ROCURONIUM BROMIDE 10 MG/ML
INJECTION, SOLUTION INTRAVENOUS PRN
Status: DISCONTINUED | OUTPATIENT
Start: 2023-05-07 | End: 2023-05-07 | Stop reason: SDUPTHER

## 2023-05-07 RX ORDER — MELOXICAM 7.5 MG/1
3.75 TABLET ORAL DAILY
Status: DISCONTINUED | OUTPATIENT
Start: 2023-05-07 | End: 2023-05-09 | Stop reason: HOSPADM

## 2023-05-07 RX ORDER — SODIUM CHLORIDE 9 MG/ML
INJECTION, SOLUTION INTRAVENOUS PRN
Status: DISCONTINUED | OUTPATIENT
Start: 2023-05-07 | End: 2023-05-07 | Stop reason: HOSPADM

## 2023-05-07 RX ORDER — ONDANSETRON 2 MG/ML
4 INJECTION INTRAMUSCULAR; INTRAVENOUS
Status: DISCONTINUED | OUTPATIENT
Start: 2023-05-07 | End: 2023-05-07 | Stop reason: HOSPADM

## 2023-05-07 RX ORDER — LIDOCAINE HYDROCHLORIDE 10 MG/ML
INJECTION, SOLUTION INFILTRATION; PERINEURAL PRN
Status: DISCONTINUED | OUTPATIENT
Start: 2023-05-07 | End: 2023-05-07 | Stop reason: SDUPTHER

## 2023-05-07 RX ORDER — ROPINIROLE 0.25 MG/1
0.25 TABLET, FILM COATED ORAL NIGHTLY
Status: DISCONTINUED | OUTPATIENT
Start: 2023-05-07 | End: 2023-05-09 | Stop reason: HOSPADM

## 2023-05-07 RX ORDER — SODIUM CHLORIDE 0.9 % (FLUSH) 0.9 %
5-40 SYRINGE (ML) INJECTION EVERY 12 HOURS SCHEDULED
Status: DISCONTINUED | OUTPATIENT
Start: 2023-05-07 | End: 2023-05-09 | Stop reason: HOSPADM

## 2023-05-07 RX ORDER — SODIUM CHLORIDE 0.9 % (FLUSH) 0.9 %
5-40 SYRINGE (ML) INJECTION PRN
Status: DISCONTINUED | OUTPATIENT
Start: 2023-05-07 | End: 2023-05-07 | Stop reason: HOSPADM

## 2023-05-07 RX ORDER — MORPHINE SULFATE 4 MG/ML
4 INJECTION, SOLUTION INTRAMUSCULAR; INTRAVENOUS
Status: DISCONTINUED | OUTPATIENT
Start: 2023-05-07 | End: 2023-05-09 | Stop reason: HOSPADM

## 2023-05-07 RX ORDER — SODIUM CHLORIDE 0.9 % (FLUSH) 0.9 %
5-40 SYRINGE (ML) INJECTION EVERY 12 HOURS SCHEDULED
Status: DISCONTINUED | OUTPATIENT
Start: 2023-05-07 | End: 2023-05-07 | Stop reason: HOSPADM

## 2023-05-07 RX ORDER — FENTANYL CITRATE 50 UG/ML
INJECTION, SOLUTION INTRAMUSCULAR; INTRAVENOUS PRN
Status: DISCONTINUED | OUTPATIENT
Start: 2023-05-07 | End: 2023-05-07 | Stop reason: SDUPTHER

## 2023-05-07 RX ORDER — TRANEXAMIC ACID 100 MG/ML
INJECTION, SOLUTION INTRAVENOUS PRN
Status: DISCONTINUED | OUTPATIENT
Start: 2023-05-07 | End: 2023-05-07 | Stop reason: SDUPTHER

## 2023-05-07 RX ORDER — PANTOPRAZOLE SODIUM 40 MG/1
40 TABLET, DELAYED RELEASE ORAL
Status: DISCONTINUED | OUTPATIENT
Start: 2023-05-08 | End: 2023-05-09 | Stop reason: HOSPADM

## 2023-05-07 RX ORDER — CITALOPRAM 20 MG/1
40 TABLET ORAL DAILY
Status: DISCONTINUED | OUTPATIENT
Start: 2023-05-07 | End: 2023-05-09 | Stop reason: HOSPADM

## 2023-05-07 RX ORDER — PROPOFOL 10 MG/ML
INJECTION, EMULSION INTRAVENOUS PRN
Status: DISCONTINUED | OUTPATIENT
Start: 2023-05-07 | End: 2023-05-07 | Stop reason: SDUPTHER

## 2023-05-07 RX ORDER — MORPHINE SULFATE 2 MG/ML
2 INJECTION, SOLUTION INTRAMUSCULAR; INTRAVENOUS
Status: DISCONTINUED | OUTPATIENT
Start: 2023-05-07 | End: 2023-05-09 | Stop reason: HOSPADM

## 2023-05-07 RX ORDER — IMATINIB MESYLATE 100 MG/1
300 TABLET, FILM COATED ORAL NIGHTLY
Status: DISCONTINUED | OUTPATIENT
Start: 2023-05-07 | End: 2023-05-09 | Stop reason: HOSPADM

## 2023-05-07 RX ORDER — DEXAMETHASONE SODIUM PHOSPHATE 10 MG/ML
INJECTION, SOLUTION INTRAMUSCULAR; INTRAVENOUS PRN
Status: DISCONTINUED | OUTPATIENT
Start: 2023-05-07 | End: 2023-05-07 | Stop reason: SDUPTHER

## 2023-05-07 RX ORDER — OXYCODONE HYDROCHLORIDE 5 MG/1
10 TABLET ORAL EVERY 4 HOURS PRN
Status: DISCONTINUED | OUTPATIENT
Start: 2023-05-07 | End: 2023-05-09 | Stop reason: HOSPADM

## 2023-05-07 RX ORDER — ONDANSETRON 2 MG/ML
4 INJECTION INTRAMUSCULAR; INTRAVENOUS EVERY 6 HOURS PRN
Status: DISCONTINUED | OUTPATIENT
Start: 2023-05-07 | End: 2023-05-09 | Stop reason: HOSPADM

## 2023-05-07 RX ORDER — HYDROMORPHONE HYDROCHLORIDE 1 MG/ML
0.5 INJECTION, SOLUTION INTRAMUSCULAR; INTRAVENOUS; SUBCUTANEOUS EVERY 5 MIN PRN
Status: DISCONTINUED | OUTPATIENT
Start: 2023-05-07 | End: 2023-05-07 | Stop reason: HOSPADM

## 2023-05-07 RX ORDER — FLUTICASONE PROPIONATE 50 MCG
1 SPRAY, SUSPENSION (ML) NASAL 2 TIMES DAILY
Status: DISCONTINUED | OUTPATIENT
Start: 2023-05-07 | End: 2023-05-09 | Stop reason: HOSPADM

## 2023-05-07 RX ORDER — SODIUM CHLORIDE 9 MG/ML
INJECTION, SOLUTION INTRAVENOUS PRN
Status: DISCONTINUED | OUTPATIENT
Start: 2023-05-07 | End: 2023-05-09 | Stop reason: HOSPADM

## 2023-05-07 RX ADMIN — TRANEXAMIC ACID 1000 MG: 1 INJECTION, SOLUTION INTRAVENOUS at 13:04

## 2023-05-07 RX ADMIN — CITALOPRAM HYDROBROMIDE 40 MG: 20 TABLET ORAL at 15:27

## 2023-05-07 RX ADMIN — PROPOFOL 150 MG: 10 INJECTION, EMULSION INTRAVENOUS at 11:19

## 2023-05-07 RX ADMIN — ONDANSETRON 4 MG: 2 INJECTION INTRAMUSCULAR; INTRAVENOUS at 02:19

## 2023-05-07 RX ADMIN — MORPHINE SULFATE 4 MG: 4 INJECTION, SOLUTION INTRAMUSCULAR; INTRAVENOUS at 06:48

## 2023-05-07 RX ADMIN — SODIUM CHLORIDE, POTASSIUM CHLORIDE, SODIUM LACTATE AND CALCIUM CHLORIDE: 600; 310; 30; 20 INJECTION, SOLUTION INTRAVENOUS at 08:52

## 2023-05-07 RX ADMIN — ONDANSETRON 4 MG: 2 INJECTION INTRAMUSCULAR; INTRAVENOUS at 08:56

## 2023-05-07 RX ADMIN — CEFAZOLIN 2000 MG: 2 INJECTION, POWDER, FOR SOLUTION INTRAMUSCULAR; INTRAVENOUS at 20:47

## 2023-05-07 RX ADMIN — ROCURONIUM BROMIDE 50 MG: 10 INJECTION, SOLUTION INTRAVENOUS at 11:19

## 2023-05-07 RX ADMIN — HYDROMORPHONE HYDROCHLORIDE 0.5 MG: 1 INJECTION, SOLUTION INTRAMUSCULAR; INTRAVENOUS; SUBCUTANEOUS at 13:57

## 2023-05-07 RX ADMIN — MORPHINE SULFATE 4 MG: 4 INJECTION, SOLUTION INTRAMUSCULAR; INTRAVENOUS at 08:56

## 2023-05-07 RX ADMIN — OXYCODONE 10 MG: 5 TABLET ORAL at 17:11

## 2023-05-07 RX ADMIN — FENTANYL CITRATE 50 MCG: 0.05 INJECTION, SOLUTION INTRAMUSCULAR; INTRAVENOUS at 12:11

## 2023-05-07 RX ADMIN — DEXAMETHASONE SODIUM PHOSPHATE 4 MG: 10 INJECTION, SOLUTION INTRAMUSCULAR; INTRAVENOUS at 11:32

## 2023-05-07 RX ADMIN — SUGAMMADEX 150 MG: 100 INJECTION, SOLUTION INTRAVENOUS at 13:07

## 2023-05-07 RX ADMIN — ACETAMINOPHEN 650 MG: 325 TABLET ORAL at 15:26

## 2023-05-07 RX ADMIN — CEFAZOLIN SODIUM 2 G: 1 INJECTION, POWDER, FOR SOLUTION INTRAMUSCULAR; INTRAVENOUS at 11:38

## 2023-05-07 RX ADMIN — ROPINIROLE HYDROCHLORIDE 0.25 MG: 0.25 TABLET, FILM COATED ORAL at 20:47

## 2023-05-07 RX ADMIN — SODIUM CHLORIDE, SODIUM LACTATE, POTASSIUM CHLORIDE, AND CALCIUM CHLORIDE: 600; 310; 30; 20 INJECTION, SOLUTION INTRAVENOUS at 11:15

## 2023-05-07 RX ADMIN — MORPHINE SULFATE 4 MG: 4 INJECTION, SOLUTION INTRAMUSCULAR; INTRAVENOUS at 02:19

## 2023-05-07 RX ADMIN — PHENYLEPHRINE HYDROCHLORIDE 50 MCG: 10 INJECTION INTRAVENOUS at 11:54

## 2023-05-07 RX ADMIN — HYDROMORPHONE HYDROCHLORIDE 0.25 MG: 1 INJECTION, SOLUTION INTRAMUSCULAR; INTRAVENOUS; SUBCUTANEOUS at 14:07

## 2023-05-07 RX ADMIN — ASPIRIN 325 MG: 325 TABLET, COATED ORAL at 20:47

## 2023-05-07 RX ADMIN — FENTANYL CITRATE 100 MCG: 0.05 INJECTION, SOLUTION INTRAMUSCULAR; INTRAVENOUS at 11:19

## 2023-05-07 RX ADMIN — IMATINIB MESYLATE 300 MG: 100 TABLET, FILM COATED ORAL at 20:47

## 2023-05-07 RX ADMIN — SODIUM CHLORIDE, POTASSIUM CHLORIDE, SODIUM LACTATE AND CALCIUM CHLORIDE: 600; 310; 30; 20 INJECTION, SOLUTION INTRAVENOUS at 14:50

## 2023-05-07 RX ADMIN — TRANEXAMIC ACID 1000 MG: 1 INJECTION, SOLUTION INTRAVENOUS at 12:16

## 2023-05-07 RX ADMIN — LIDOCAINE HYDROCHLORIDE 50 MG: 10 INJECTION, SOLUTION INFILTRATION; PERINEURAL at 11:19

## 2023-05-07 RX ADMIN — OXYCODONE 10 MG: 5 TABLET ORAL at 23:47

## 2023-05-07 RX ADMIN — MORPHINE SULFATE 4 MG: 4 INJECTION, SOLUTION INTRAMUSCULAR; INTRAVENOUS at 20:47

## 2023-05-07 RX ADMIN — ACETAMINOPHEN 650 MG: 325 TABLET ORAL at 20:47

## 2023-05-07 ASSESSMENT — PAIN DESCRIPTION - LOCATION
LOCATION: HIP;LEG
LOCATION: HIP

## 2023-05-07 ASSESSMENT — PAIN DESCRIPTION - DESCRIPTORS
DESCRIPTORS: ACHING
DESCRIPTORS: ACHING;THROBBING
DESCRIPTORS: BURNING
DESCRIPTORS: BURNING
DESCRIPTORS: ACHING;THROBBING
DESCRIPTORS: ACHING

## 2023-05-07 ASSESSMENT — PAIN SCALES - GENERAL
PAINLEVEL_OUTOF10: 7
PAINLEVEL_OUTOF10: 5
PAINLEVEL_OUTOF10: 10
PAINLEVEL_OUTOF10: 7
PAINLEVEL_OUTOF10: 8
PAINLEVEL_OUTOF10: 10
PAINLEVEL_OUTOF10: 0
PAINLEVEL_OUTOF10: 10
PAINLEVEL_OUTOF10: 0
PAINLEVEL_OUTOF10: 7

## 2023-05-07 ASSESSMENT — PAIN DESCRIPTION - ORIENTATION
ORIENTATION: LEFT

## 2023-05-07 ASSESSMENT — ENCOUNTER SYMPTOMS
ABDOMINAL PAIN: 0
COUGH: 0
EYE PAIN: 0
SHORTNESS OF BREATH: 0
NAUSEA: 0
SORE THROAT: 0
ABDOMINAL DISTENTION: 0
EYE REDNESS: 0

## 2023-05-07 ASSESSMENT — LIFESTYLE VARIABLES: SMOKING_STATUS: 0

## 2023-05-08 LAB
ANION GAP SERPL CALCULATED.3IONS-SCNC: 10 MMOL/L (ref 7–19)
BUN SERPL-MCNC: 9 MG/DL (ref 8–23)
CALCIUM SERPL-MCNC: 8.3 MG/DL (ref 8.8–10.2)
CHLORIDE SERPL-SCNC: 100 MMOL/L (ref 98–111)
CO2 SERPL-SCNC: 25 MMOL/L (ref 22–29)
CREAT SERPL-MCNC: 0.8 MG/DL (ref 0.5–1.2)
GLUCOSE SERPL-MCNC: 148 MG/DL (ref 74–109)
HCT VFR BLD AUTO: 32.6 % (ref 42–52)
HGB BLD-MCNC: 11.2 G/DL (ref 14–18)
POTASSIUM SERPL-SCNC: 4.1 MMOL/L (ref 3.5–5)
SODIUM SERPL-SCNC: 135 MMOL/L (ref 136–145)

## 2023-05-08 PROCEDURE — 94760 N-INVAS EAR/PLS OXIMETRY 1: CPT

## 2023-05-08 PROCEDURE — 80048 BASIC METABOLIC PNL TOTAL CA: CPT

## 2023-05-08 PROCEDURE — 85018 HEMOGLOBIN: CPT

## 2023-05-08 PROCEDURE — 97166 OT EVAL MOD COMPLEX 45 MIN: CPT

## 2023-05-08 PROCEDURE — 2580000003 HC RX 258: Performed by: ORTHOPAEDIC SURGERY

## 2023-05-08 PROCEDURE — 6360000002 HC RX W HCPCS: Performed by: ORTHOPAEDIC SURGERY

## 2023-05-08 PROCEDURE — 36415 COLL VENOUS BLD VENIPUNCTURE: CPT

## 2023-05-08 PROCEDURE — 99232 SBSQ HOSP IP/OBS MODERATE 35: CPT | Performed by: SURGERY

## 2023-05-08 PROCEDURE — 97116 GAIT TRAINING THERAPY: CPT

## 2023-05-08 PROCEDURE — 85014 HEMATOCRIT: CPT

## 2023-05-08 PROCEDURE — 1210000000 HC MED SURG R&B

## 2023-05-08 PROCEDURE — 6370000000 HC RX 637 (ALT 250 FOR IP): Performed by: ORTHOPAEDIC SURGERY

## 2023-05-08 PROCEDURE — 97162 PT EVAL MOD COMPLEX 30 MIN: CPT

## 2023-05-08 PROCEDURE — 97530 THERAPEUTIC ACTIVITIES: CPT

## 2023-05-08 RX ORDER — CLONIDINE HYDROCHLORIDE 0.1 MG/1
0.1 TABLET ORAL EVERY 4 HOURS PRN
Status: DISCONTINUED | OUTPATIENT
Start: 2023-05-08 | End: 2023-05-09 | Stop reason: HOSPADM

## 2023-05-08 RX ADMIN — OXYCODONE 10 MG: 5 TABLET ORAL at 07:43

## 2023-05-08 RX ADMIN — MORPHINE SULFATE 2 MG: 2 INJECTION, SOLUTION INTRAMUSCULAR; INTRAVENOUS at 10:43

## 2023-05-08 RX ADMIN — OXYCODONE 10 MG: 5 TABLET ORAL at 14:00

## 2023-05-08 RX ADMIN — OXYCODONE 10 MG: 5 TABLET ORAL at 22:56

## 2023-05-08 RX ADMIN — ASPIRIN 325 MG: 325 TABLET, COATED ORAL at 07:43

## 2023-05-08 RX ADMIN — CITALOPRAM HYDROBROMIDE 40 MG: 20 TABLET ORAL at 07:43

## 2023-05-08 RX ADMIN — OXYCODONE 10 MG: 5 TABLET ORAL at 03:39

## 2023-05-08 RX ADMIN — ACETAMINOPHEN 650 MG: 325 TABLET ORAL at 22:47

## 2023-05-08 RX ADMIN — ACETAMINOPHEN 650 MG: 325 TABLET ORAL at 07:43

## 2023-05-08 RX ADMIN — ROPINIROLE HYDROCHLORIDE 0.25 MG: 0.25 TABLET, FILM COATED ORAL at 22:47

## 2023-05-08 RX ADMIN — CEFAZOLIN 2000 MG: 2 INJECTION, POWDER, FOR SOLUTION INTRAMUSCULAR; INTRAVENOUS at 05:50

## 2023-05-08 RX ADMIN — IMATINIB MESYLATE 300 MG: 100 TABLET, FILM COATED ORAL at 22:47

## 2023-05-08 RX ADMIN — ACETAMINOPHEN 650 MG: 325 TABLET ORAL at 03:39

## 2023-05-08 RX ADMIN — ACETAMINOPHEN 650 MG: 325 TABLET ORAL at 14:31

## 2023-05-08 RX ADMIN — ASPIRIN 325 MG: 325 TABLET, COATED ORAL at 22:47

## 2023-05-08 RX ADMIN — PANTOPRAZOLE SODIUM 40 MG: 40 TABLET, DELAYED RELEASE ORAL at 05:50

## 2023-05-08 ASSESSMENT — PAIN DESCRIPTION - DESCRIPTORS
DESCRIPTORS: ACHING;SORE
DESCRIPTORS: ACHING;THROBBING
DESCRIPTORS: ACHING;THROBBING
DESCRIPTORS: ACHING

## 2023-05-08 ASSESSMENT — PAIN SCALES - GENERAL
PAINLEVEL_OUTOF10: 6
PAINLEVEL_OUTOF10: 8
PAINLEVEL_OUTOF10: 7

## 2023-05-08 ASSESSMENT — PAIN DESCRIPTION - LOCATION
LOCATION: HIP;LEG
LOCATION: HIP
LOCATION: HIP;LEG
LOCATION: LEG;HIP

## 2023-05-08 ASSESSMENT — PAIN DESCRIPTION - ORIENTATION
ORIENTATION: LEFT

## 2023-05-08 NOTE — CONSULTS
headaches, seizures, speech problems, tremors and vertigo   A Full 14 point review of systems is negative outside those listed above and in the HPI      History    Past Medical History:   Diagnosis Date    Arthritis     Bilateral low back pain with right-sided sciatica 2015    Cancer ([facility] [address]     cml    Hepatitis     c; remission after treatment    Hyperlipidemia 2018    MDRO (multiple drug resistant organisms) resistance     MRSA (methicillin resistant staph aureus) culture positive     bump in nose     Past Surgical History:   Procedure Laterality Date    BACK SURGERY      CERVICAL SPINE SURGERY      COLONOSCOPY      JOINT MANIPULATION Left 2018    LEG CAST REMOVAL KNEE MANIPULATION WITH KNEE INJECTION EXAM UNDER ANESTHESIA performed by Darline Menjivar MD at [address] [place] [address]/[facility]/dr mccall    AR REVJ TOT KNEE ARTHRP [id number] Left 2018    KNEE TOTAL ARTHROPLASTY REVISION performed by Darline Menjivar MD at [facility] OR     Family History   Problem Relation Age of Onset    Cancer Father     Heart Failure Mother      Social History     Tobacco Use    Smoking status: Former     Types: Cigarettes     Quit date: 1975     Years since quittin.4    Smokeless tobacco: Never   Vaping Use    Vaping Use: Never used   Substance Use Topics    Alcohol use: Yes     Comment: at least a 6 pack daily    Drug use: Yes     Types: Marijuana Esha Rocker)     Comment: occ     Medications Prior to Admission: rOPINIRole (REQUIP) 0.25 MG tablet, Take 0.25 mg by mouth nightly  citalopram (CELEXA) 40 MG tablet, Take 1 tablet by mouth daily  omeprazole (PRILOSEC) 40 MG capsule, Take 1 capsule by mouth daily  GLEEVEC 100 MG chemo tablet, Take 3 tablets by mouth nightly Indications: Chronic Myelogenous Leukemia  fluticasone (FLONASE) 50 MCG/ACT nasal spray, 1 spray by Nasal route 2 times daily  Patient has no known allergies.   Objective     Vital Signs   All pre-op and post op vitals reviewed

## 2023-05-08 NOTE — CARE COORDINATION
Spoke with patient regarding MD orders for [facility] services. Patient agreeable and has chosen [facility]. Referral Faxed. [address] [place] [address] [phone]. [phone]. Please notify [facility] when patient discharges and fax DC Summary,  DC med list and any new [facility] orders. The Patient and/or patient representative was provided with a choice of provider and agrees   with the discharge plan. [x] Yes [] No    Freedom of choice list was provided with basic dialogue that supports the patient's individualized plan of care/goals, treatment preferences and shares the quality data associated with the providers.  [x] Yes [] No  Electronically signed by Key Crespo on 5/8/2023 at 10:21 AM

## 2023-05-09 VITALS
DIASTOLIC BLOOD PRESSURE: 76 MMHG | SYSTOLIC BLOOD PRESSURE: 118 MMHG | BODY MASS INDEX: 24.75 KG/M2 | HEIGHT: 64 IN | TEMPERATURE: 99 F | RESPIRATION RATE: 16 BRPM | OXYGEN SATURATION: 91 % | HEART RATE: 84 BPM | WEIGHT: 145 LBS

## 2023-05-09 LAB
ANION GAP SERPL CALCULATED.3IONS-SCNC: 11 MMOL/L (ref 7–19)
BUN SERPL-MCNC: 10 MG/DL (ref 8–23)
CALCIUM SERPL-MCNC: 8.1 MG/DL (ref 8.8–10.2)
CHLORIDE SERPL-SCNC: 97 MMOL/L (ref 98–111)
CO2 SERPL-SCNC: 27 MMOL/L (ref 22–29)
CREAT SERPL-MCNC: 0.9 MG/DL (ref 0.5–1.2)
FOLATE SERPL-MCNC: 8.3 NG/ML (ref 4.5–32.2)
GLUCOSE SERPL-MCNC: 117 MG/DL (ref 74–109)
HCT VFR BLD AUTO: 30.7 % (ref 42–52)
HGB BLD-MCNC: 10 G/DL (ref 14–18)
IRON SATN MFR SERPL: 6 % (ref 14–50)
IRON SERPL-MCNC: 13 UG/DL (ref 59–158)
POTASSIUM SERPL-SCNC: 3.8 MMOL/L (ref 3.5–5)
SODIUM SERPL-SCNC: 135 MMOL/L (ref 136–145)
TIBC SERPL-MCNC: 223 UG/DL (ref 250–400)
VIT B12 SERPL-MCNC: 335 PG/ML (ref 211–946)

## 2023-05-09 PROCEDURE — 82746 ASSAY OF FOLIC ACID SERUM: CPT

## 2023-05-09 PROCEDURE — 85014 HEMATOCRIT: CPT

## 2023-05-09 PROCEDURE — 97116 GAIT TRAINING THERAPY: CPT

## 2023-05-09 PROCEDURE — 85018 HEMOGLOBIN: CPT

## 2023-05-09 PROCEDURE — 82607 VITAMIN B-12: CPT

## 2023-05-09 PROCEDURE — 83540 ASSAY OF IRON: CPT

## 2023-05-09 PROCEDURE — 94760 N-INVAS EAR/PLS OXIMETRY 1: CPT

## 2023-05-09 PROCEDURE — 83550 IRON BINDING TEST: CPT

## 2023-05-09 PROCEDURE — 6370000000 HC RX 637 (ALT 250 FOR IP): Performed by: ORTHOPAEDIC SURGERY

## 2023-05-09 PROCEDURE — 36415 COLL VENOUS BLD VENIPUNCTURE: CPT

## 2023-05-09 PROCEDURE — 2580000003 HC RX 258: Performed by: ORTHOPAEDIC SURGERY

## 2023-05-09 PROCEDURE — 80048 BASIC METABOLIC PNL TOTAL CA: CPT

## 2023-05-09 RX ORDER — TRAMADOL HYDROCHLORIDE 50 MG/1
50 TABLET ORAL EVERY 6 HOURS PRN
Qty: 12 TABLET | Refills: 0 | Status: SHIPPED | OUTPATIENT
Start: 2023-05-09 | End: 2023-05-09 | Stop reason: SDUPTHER

## 2023-05-09 RX ORDER — TRAMADOL HYDROCHLORIDE 50 MG/1
50 TABLET ORAL EVERY 6 HOURS PRN
Status: DISCONTINUED | OUTPATIENT
Start: 2023-05-09 | End: 2023-05-09 | Stop reason: HOSPADM

## 2023-05-09 RX ORDER — OXYCODONE HYDROCHLORIDE 5 MG/1
5 TABLET ORAL EVERY 4 HOURS PRN
Qty: 40 TABLET | Refills: 0 | Status: SHIPPED | OUTPATIENT
Start: 2023-05-09 | End: 2023-05-12

## 2023-05-09 RX ORDER — TRAMADOL HYDROCHLORIDE 50 MG/1
50 TABLET ORAL EVERY 6 HOURS PRN
Qty: 12 TABLET | Refills: 0 | Status: SHIPPED | OUTPATIENT
Start: 2023-05-09 | End: 2023-05-12

## 2023-05-09 RX ADMIN — OXYCODONE 10 MG: 5 TABLET ORAL at 11:58

## 2023-05-09 RX ADMIN — CITALOPRAM HYDROBROMIDE 40 MG: 20 TABLET ORAL at 09:50

## 2023-05-09 RX ADMIN — ACETAMINOPHEN 650 MG: 325 TABLET ORAL at 09:50

## 2023-05-09 RX ADMIN — OXYCODONE 10 MG: 5 TABLET ORAL at 06:49

## 2023-05-09 RX ADMIN — ACETAMINOPHEN 650 MG: 325 TABLET ORAL at 06:45

## 2023-05-09 RX ADMIN — PANTOPRAZOLE SODIUM 40 MG: 40 TABLET, DELAYED RELEASE ORAL at 06:50

## 2023-05-09 RX ADMIN — SODIUM CHLORIDE, PRESERVATIVE FREE 10 ML: 5 INJECTION INTRAVENOUS at 09:52

## 2023-05-09 RX ADMIN — ASPIRIN 325 MG: 325 TABLET, COATED ORAL at 09:50

## 2023-05-09 ASSESSMENT — PAIN DESCRIPTION - DESCRIPTORS
DESCRIPTORS: ACHING;SORE
DESCRIPTORS: ACHING;THROBBING

## 2023-05-09 ASSESSMENT — PAIN DESCRIPTION - FREQUENCY: FREQUENCY: CONTINUOUS

## 2023-05-09 ASSESSMENT — PAIN - FUNCTIONAL ASSESSMENT: PAIN_FUNCTIONAL_ASSESSMENT: PREVENTS OR INTERFERES SOME ACTIVE ACTIVITIES AND ADLS

## 2023-05-09 ASSESSMENT — PAIN DESCRIPTION - LOCATION
LOCATION: HIP
LOCATION: HIP;LEG

## 2023-05-09 ASSESSMENT — PAIN DESCRIPTION - PAIN TYPE: TYPE: ACUTE PAIN

## 2023-05-09 ASSESSMENT — PAIN DESCRIPTION - ORIENTATION
ORIENTATION: LEFT
ORIENTATION: LEFT

## 2023-05-09 ASSESSMENT — PAIN SCALES - GENERAL
PAINLEVEL_OUTOF10: 5
PAINLEVEL_OUTOF10: 7

## 2023-05-09 NOTE — DISCHARGE INSTRUCTIONS
[facility] [place] [facility]  Dr. Vicki Kennedy      Total Hip & Bipolar Replacement  Home Instructions     To prevent blood clots, you have been placed on the following medication:  Aspirin 81 mg twice a day for four weeks    Surgical Site Care: Showering is permitted on post op day 2  No submersion in a bath, swimming pool, whirlpool, etc     Home physical therapy 2 days a week and a once a week nurse will be arranged before you get home    Weight Bearing Status:  50% weight bearing on left leg for 1 month  Use a walker    Precautions  Don't walk for exercise (The longer you are on your feet the more sore you will be)  Stay close to home  You may go for short car rides    Pain Medications  You were given a prescription to fill at your pharmacy  Wean off pain medications as you deem appropriate as long as pain is under control  Take tylenol instead of the pain medicine as you improve                                                                                                                           FEVER of 101.5 or less  Please take a stool softener such as Colace to prevent constipation                   Tylenol x 2                                                                                                                                       Deep breath x 10  Do not drive until surgeon releases you                                                                Cough, cough, cough  DO NOT SMOKE, VAPE OR CHEW!!! Recheck in 1 - 11/2 hours    Cold packs  May be used as necessary  Be sure to have a barrier (cloth, clothing, towel) between the site and the ice pack to prevent frostbite    Contact office if  Increased redness, swelling, drainage of any kind, and/or severe pain at surgery site. As well as new onset fevers and or chills. These could signify an infection.   Calf or thigh tenderness to touch as well as increased
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
## 2023-05-09 NOTE — DISCHARGE INSTR - DIET
Good nutrition is important when healing from an illness, injury, or surgery. Follow any nutrition recommendations given to you during your hospital stay. If you were given an oral nutrition supplement while in the hospital, continue to take this supplement at home. You can take it with meals, in-between meals, and/or before bedtime. These supplements can be purchased at most local grocery stores, pharmacies, and chain [employer]-stores. If you have any questions about your diet or nutrition, call the hospital and ask for the dietitian.             Resume home diet    Regular diet

## 2023-05-19 ENCOUNTER — TELEPHONE (OUTPATIENT)
Dept: ONCOLOGY | Facility: CLINIC | Age: 68
End: 2023-05-19

## 2023-05-19 DIAGNOSIS — C92.10 CML (CHRONIC MYELOCYTIC LEUKEMIA): Primary | ICD-10-CM

## 2023-05-19 RX ORDER — IMATINIB MESYLATE 100 MG/1
300 TABLET, FILM COATED ORAL DAILY
Qty: 90 TABLET | Refills: 6 | Status: SHIPPED | OUTPATIENT
Start: 2023-05-19

## 2023-05-19 NOTE — TELEPHONE ENCOUNTER
Caller: Cayetano Curry    Relationship: Self    Best call back number: [phone]    What is the best time to reach you: ANYTIME    Who are you requesting to speak with (clinical staff, provider,  specific staff member): TIFF BRAXTON - PHARMACY    What was the call regarding: PLEASE HAVE TIFF CALL PT TO DISCUSS THE FINANCIAL ASSISTANCE FOR IMATINIB. HE IS STILL HAVING TROUBLE GETTING EVERYTHING SETUP.     PLEASE CALL TO ADVISE.     Do you require a callback: YES

## 2023-05-23 DIAGNOSIS — C92.10 CML (CHRONIC MYELOCYTIC LEUKEMIA): Primary | ICD-10-CM

## 2023-06-02 ENCOUNTER — SPECIALTY PHARMACY (OUTPATIENT)
Dept: ONCOLOGY | Facility: HOSPITAL | Age: 68
End: 2023-06-02

## 2023-06-02 NOTE — PROGRESS NOTES
Specialty Pharmacy Refill Coordination Note     Patient Outreach  An attempt was made by the [facility] to contact this patient for a follow-up on their chemotherapy medication. The [facility] can be reached at [phone].      Neena Turner, Pharmacy Technician  Specialty Pharmacy Technician

## 2023-06-05 ENCOUNTER — SPECIALTY PHARMACY (OUTPATIENT)
Dept: ONCOLOGY | Facility: HOSPITAL | Age: 68
End: 2023-06-05
Payer: MEDICARE

## 2023-06-05 NOTE — PROGRESS NOTES
Specialty Pharmacy Refill Coordination Note     Patient Outreach  A second attempt was made by the [facility] to contact this patient for a follow-up on their chemotherapy medication. The [facility] can be reached at [phone].       Neena Turner, Pharmacy Technician  Specialty Pharmacy Technician

## 2023-06-13 ENCOUNTER — TELEPHONE (OUTPATIENT)
Dept: ONCOLOGY | Facility: CLINIC | Age: 68
End: 2023-06-13

## 2023-06-13 NOTE — TELEPHONE ENCOUNTER
Caller: Cayetano Curry    Relationship: Self    Best call back number: [phone]    What is the best time to reach you: ANYTIME    Who are you requesting to speak with (clinical staff, provider, specific staff member): SCHEDULING    What was the call regarding: PLEASE CALL PATIENT TO R/S HIS MISSED LAB AND F/U APPT.     Is it okay if the provider responds through MyChart: NO

## 2023-07-07 ENCOUNTER — APPOINTMENT (OUTPATIENT)
Dept: GENERAL RADIOLOGY | Age: 68
DRG: 605 | End: 2023-07-07
Payer: MEDICARE

## 2023-07-07 ENCOUNTER — HOSPITAL ENCOUNTER (INPATIENT)
Age: 68
LOS: 2 days | Discharge: HOME | DRG: 605 | End: 2023-07-09
Attending: EMERGENCY MEDICINE | Admitting: HOSPITALIST
Payer: MEDICARE

## 2023-07-07 DIAGNOSIS — S68.119A FINGER AMPUTATION, TRAUMATIC, INITIAL ENCOUNTER: ICD-10-CM

## 2023-07-07 DIAGNOSIS — S67.21XA CRUSHING INJURY OF RIGHT HAND, INITIAL ENCOUNTER: ICD-10-CM

## 2023-07-07 DIAGNOSIS — S61.209A AVULSION, FINGER TIP, INITIAL ENCOUNTER: Primary | ICD-10-CM

## 2023-07-07 LAB
25(OH)D3 SERPL-MCNC: 41.8 NG/ML
ALBUMIN SERPL-MCNC: 4.5 G/DL (ref 3.5–5.2)
ALP SERPL-CCNC: 133 U/L (ref 40–130)
ALT SERPL-CCNC: 21 U/L (ref 5–41)
ANION GAP SERPL CALCULATED.3IONS-SCNC: 14 MMOL/L (ref 7–19)
AST SERPL-CCNC: 38 U/L (ref 5–40)
BASOPHILS # BLD: 0.1 K/UL (ref 0–0.2)
BASOPHILS NFR BLD: 0.8 % (ref 0–1)
BILIRUB SERPL-MCNC: 0.5 MG/DL (ref 0.2–1.2)
BUN SERPL-MCNC: 8 MG/DL (ref 8–23)
CALCIUM SERPL-MCNC: 9 MG/DL (ref 8.8–10.2)
CHLORIDE SERPL-SCNC: 97 MMOL/L (ref 98–111)
CO2 SERPL-SCNC: 24 MMOL/L (ref 22–29)
CREAT SERPL-MCNC: 0.7 MG/DL (ref 0.5–1.2)
EOSINOPHIL # BLD: 0.2 K/UL (ref 0–0.6)
EOSINOPHIL NFR BLD: 2.6 % (ref 0–5)
ERYTHROCYTE [DISTWIDTH] IN BLOOD BY AUTOMATED COUNT: 13.7 % (ref 11.5–14.5)
ETHANOLAMINE SERPL-MCNC: 160 MG/DL (ref 0–0.08)
FERRITIN SERPL-MCNC: 197.4 NG/ML (ref 30–400)
FOLATE SERPL-MCNC: 16.8 NG/ML (ref 4.5–32.2)
GLUCOSE SERPL-MCNC: 124 MG/DL (ref 74–109)
HCT VFR BLD AUTO: 40.6 % (ref 42–52)
HGB BLD-MCNC: 13.5 G/DL (ref 14–18)
IMM GRANULOCYTES # BLD: 0 K/UL
IRON SATN MFR SERPL: 22 % (ref 14–50)
IRON SERPL-MCNC: 70 UG/DL (ref 59–158)
LYMPHOCYTES # BLD: 0.9 K/UL (ref 1.1–4.5)
LYMPHOCYTES NFR BLD: 10.8 % (ref 20–40)
MAGNESIUM SERPL-MCNC: 2 MG/DL (ref 1.6–2.4)
MCH RBC QN AUTO: 29.4 PG (ref 27–31)
MCHC RBC AUTO-ENTMCNC: 33.3 G/DL (ref 33–37)
MCV RBC AUTO: 88.5 FL (ref 80–94)
MONOCYTES # BLD: 0.7 K/UL (ref 0–0.9)
MONOCYTES NFR BLD: 7.7 % (ref 0–10)
NEUTROPHILS # BLD: 6.6 K/UL (ref 1.5–7.5)
NEUTS SEG NFR BLD: 77.7 % (ref 50–65)
PLATELET # BLD AUTO: 234 K/UL (ref 130–400)
PMV BLD AUTO: 9.1 FL (ref 9.4–12.4)
POTASSIUM SERPL-SCNC: 3.6 MMOL/L (ref 3.5–5)
PROT SERPL-MCNC: 7.2 G/DL (ref 6.6–8.7)
RBC # BLD AUTO: 4.59 M/UL (ref 4.7–6.1)
SODIUM SERPL-SCNC: 135 MMOL/L (ref 136–145)
TIBC SERPL-MCNC: 324 UG/DL (ref 250–400)
TSH SERPL DL<=0.005 MIU/L-ACNC: 2.79 UIU/ML (ref 0.27–4.2)
VIT B12 SERPL-MCNC: 438 PG/ML (ref 211–946)
WBC # BLD AUTO: 8.5 K/UL (ref 4.8–10.8)

## 2023-07-07 PROCEDURE — 83540 ASSAY OF IRON: CPT

## 2023-07-07 PROCEDURE — 2580000003 HC RX 258: Performed by: HOSPITALIST

## 2023-07-07 PROCEDURE — 96365 THER/PROPH/DIAG IV INF INIT: CPT

## 2023-07-07 PROCEDURE — 73130 X-RAY EXAM OF HAND: CPT

## 2023-07-07 PROCEDURE — 6370000000 HC RX 637 (ALT 250 FOR IP): Performed by: INTERNAL MEDICINE

## 2023-07-07 PROCEDURE — 80053 COMPREHEN METABOLIC PANEL: CPT

## 2023-07-07 PROCEDURE — 96375 TX/PRO/DX INJ NEW DRUG ADDON: CPT

## 2023-07-07 PROCEDURE — 82728 ASSAY OF FERRITIN: CPT

## 2023-07-07 PROCEDURE — 82077 ASSAY SPEC XCP UR&BREATH IA: CPT

## 2023-07-07 PROCEDURE — 82306 VITAMIN D 25 HYDROXY: CPT

## 2023-07-07 PROCEDURE — 6370000000 HC RX 637 (ALT 250 FOR IP): Performed by: HOSPITALIST

## 2023-07-07 PROCEDURE — 85025 COMPLETE CBC W/AUTO DIFF WBC: CPT

## 2023-07-07 PROCEDURE — 83735 ASSAY OF MAGNESIUM: CPT

## 2023-07-07 PROCEDURE — 36415 COLL VENOUS BLD VENIPUNCTURE: CPT

## 2023-07-07 PROCEDURE — 99285 EMERGENCY DEPT VISIT HI MDM: CPT

## 2023-07-07 PROCEDURE — 82746 ASSAY OF FOLIC ACID SERUM: CPT

## 2023-07-07 PROCEDURE — 6360000002 HC RX W HCPCS: Performed by: HOSPITALIST

## 2023-07-07 PROCEDURE — 6360000002 HC RX W HCPCS: Performed by: EMERGENCY MEDICINE

## 2023-07-07 PROCEDURE — 82607 VITAMIN B-12: CPT

## 2023-07-07 PROCEDURE — 83550 IRON BINDING TEST: CPT

## 2023-07-07 PROCEDURE — 1210000000 HC MED SURG R&B

## 2023-07-07 PROCEDURE — 94760 N-INVAS EAR/PLS OXIMETRY 1: CPT

## 2023-07-07 PROCEDURE — 84443 ASSAY THYROID STIM HORMONE: CPT

## 2023-07-07 PROCEDURE — 2580000003 HC RX 258: Performed by: EMERGENCY MEDICINE

## 2023-07-07 RX ORDER — LANOLIN ALCOHOL/MO/W.PET/CERES
100 CREAM (GRAM) TOPICAL DAILY
Status: DISCONTINUED | OUTPATIENT
Start: 2023-07-07 | End: 2023-07-07 | Stop reason: SDUPTHER

## 2023-07-07 RX ORDER — IMATINIB MESYLATE 100 MG/1
300 TABLET, FILM COATED ORAL NIGHTLY
Status: DISCONTINUED | OUTPATIENT
Start: 2023-07-07 | End: 2023-07-09 | Stop reason: HOSPADM

## 2023-07-07 RX ORDER — SODIUM CHLORIDE 0.9 % (FLUSH) 0.9 %
5-40 SYRINGE (ML) INJECTION EVERY 12 HOURS SCHEDULED
Status: DISCONTINUED | OUTPATIENT
Start: 2023-07-07 | End: 2023-07-09 | Stop reason: HOSPADM

## 2023-07-07 RX ORDER — ONDANSETRON 2 MG/ML
4 INJECTION INTRAMUSCULAR; INTRAVENOUS EVERY 6 HOURS PRN
Status: DISCONTINUED | OUTPATIENT
Start: 2023-07-07 | End: 2023-07-09 | Stop reason: HOSPADM

## 2023-07-07 RX ORDER — FENTANYL CITRATE 50 UG/ML
50 INJECTION, SOLUTION INTRAMUSCULAR; INTRAVENOUS ONCE
Status: COMPLETED | OUTPATIENT
Start: 2023-07-07 | End: 2023-07-07

## 2023-07-07 RX ORDER — OXYCODONE HYDROCHLORIDE AND ACETAMINOPHEN 5; 325 MG/1; MG/1
1 TABLET ORAL EVERY 6 HOURS PRN
Status: DISCONTINUED | OUTPATIENT
Start: 2023-07-07 | End: 2023-07-09 | Stop reason: HOSPADM

## 2023-07-07 RX ORDER — POLYETHYLENE GLYCOL 3350 17 G/17G
17 POWDER, FOR SOLUTION ORAL DAILY PRN
Status: DISCONTINUED | OUTPATIENT
Start: 2023-07-07 | End: 2023-07-09 | Stop reason: HOSPADM

## 2023-07-07 RX ORDER — FLUTICASONE PROPIONATE 50 MCG
1 SPRAY, SUSPENSION (ML) NASAL 2 TIMES DAILY
Status: DISCONTINUED | OUTPATIENT
Start: 2023-07-07 | End: 2023-07-09 | Stop reason: HOSPADM

## 2023-07-07 RX ORDER — ASPIRIN 325 MG
325 TABLET, DELAYED RELEASE (ENTERIC COATED) ORAL 2 TIMES DAILY
Status: DISCONTINUED | OUTPATIENT
Start: 2023-07-07 | End: 2023-07-09 | Stop reason: HOSPADM

## 2023-07-07 RX ORDER — ROPINIROLE 0.25 MG/1
0.25 TABLET, FILM COATED ORAL NIGHTLY
Status: DISCONTINUED | OUTPATIENT
Start: 2023-07-07 | End: 2023-07-09 | Stop reason: HOSPADM

## 2023-07-07 RX ORDER — MORPHINE SULFATE 4 MG/ML
4 INJECTION, SOLUTION INTRAMUSCULAR; INTRAVENOUS
Status: DISCONTINUED | OUTPATIENT
Start: 2023-07-07 | End: 2023-07-09 | Stop reason: HOSPADM

## 2023-07-07 RX ORDER — SODIUM CHLORIDE 0.9 % (FLUSH) 0.9 %
5-40 SYRINGE (ML) INJECTION PRN
Status: DISCONTINUED | OUTPATIENT
Start: 2023-07-07 | End: 2023-07-09 | Stop reason: HOSPADM

## 2023-07-07 RX ORDER — SODIUM CHLORIDE 9 MG/ML
INJECTION, SOLUTION INTRAVENOUS CONTINUOUS
Status: DISCONTINUED | OUTPATIENT
Start: 2023-07-07 | End: 2023-07-09 | Stop reason: HOSPADM

## 2023-07-07 RX ORDER — MORPHINE SULFATE 4 MG/ML
4 INJECTION, SOLUTION INTRAMUSCULAR; INTRAVENOUS ONCE
Status: COMPLETED | OUTPATIENT
Start: 2023-07-07 | End: 2023-07-07

## 2023-07-07 RX ORDER — ACETAMINOPHEN 325 MG/1
650 TABLET ORAL EVERY 6 HOURS PRN
Status: DISCONTINUED | OUTPATIENT
Start: 2023-07-07 | End: 2023-07-09 | Stop reason: HOSPADM

## 2023-07-07 RX ORDER — SODIUM CHLORIDE 9 MG/ML
INJECTION, SOLUTION INTRAVENOUS PRN
Status: DISCONTINUED | OUTPATIENT
Start: 2023-07-07 | End: 2023-07-09 | Stop reason: HOSPADM

## 2023-07-07 RX ORDER — BISMUTH TRIBROMOPH/PETROLATUM 5"X9"
1 BANDAGE TOPICAL DAILY
Status: DISCONTINUED | OUTPATIENT
Start: 2023-07-08 | End: 2023-07-09 | Stop reason: HOSPADM

## 2023-07-07 RX ORDER — ACETAMINOPHEN 650 MG/1
650 SUPPOSITORY RECTAL EVERY 6 HOURS PRN
Status: DISCONTINUED | OUTPATIENT
Start: 2023-07-07 | End: 2023-07-09 | Stop reason: HOSPADM

## 2023-07-07 RX ORDER — GAUZE BANDAGE 2" X 2"
100 BANDAGE TOPICAL DAILY
Status: DISCONTINUED | OUTPATIENT
Start: 2023-07-07 | End: 2023-07-09 | Stop reason: HOSPADM

## 2023-07-07 RX ORDER — 0.9 % SODIUM CHLORIDE 0.9 %
1000 INTRAVENOUS SOLUTION INTRAVENOUS ONCE
Status: COMPLETED | OUTPATIENT
Start: 2023-07-07 | End: 2023-07-07

## 2023-07-07 RX ORDER — ONDANSETRON 4 MG/1
4 TABLET, ORALLY DISINTEGRATING ORAL EVERY 8 HOURS PRN
Status: DISCONTINUED | OUTPATIENT
Start: 2023-07-07 | End: 2023-07-09 | Stop reason: HOSPADM

## 2023-07-07 RX ORDER — CITALOPRAM 20 MG/1
20 TABLET ORAL DAILY
Status: DISCONTINUED | OUTPATIENT
Start: 2023-07-07 | End: 2023-07-09 | Stop reason: HOSPADM

## 2023-07-07 RX ORDER — PANTOPRAZOLE SODIUM 40 MG/1
40 TABLET, DELAYED RELEASE ORAL
Status: DISCONTINUED | OUTPATIENT
Start: 2023-07-07 | End: 2023-07-09 | Stop reason: HOSPADM

## 2023-07-07 RX ORDER — ENOXAPARIN SODIUM 100 MG/ML
40 INJECTION SUBCUTANEOUS DAILY
Status: DISCONTINUED | OUTPATIENT
Start: 2023-07-08 | End: 2023-07-09 | Stop reason: HOSPADM

## 2023-07-07 RX ORDER — FOLIC ACID 1 MG/1
1 TABLET ORAL DAILY
Status: DISCONTINUED | OUTPATIENT
Start: 2023-07-07 | End: 2023-07-09 | Stop reason: HOSPADM

## 2023-07-07 RX ORDER — FENTANYL CITRATE 50 UG/ML
50 INJECTION, SOLUTION INTRAMUSCULAR; INTRAVENOUS
Status: DISCONTINUED | OUTPATIENT
Start: 2023-07-07 | End: 2023-07-07

## 2023-07-07 RX ADMIN — FENTANYL CITRATE 50 MCG: 50 INJECTION, SOLUTION INTRAMUSCULAR; INTRAVENOUS at 03:58

## 2023-07-07 RX ADMIN — PIPERACILLIN AND TAZOBACTAM 3375 MG: 3; .375 INJECTION, POWDER, LYOPHILIZED, FOR SOLUTION INTRAVENOUS at 02:25

## 2023-07-07 RX ADMIN — OXYCODONE HYDROCHLORIDE AND ACETAMINOPHEN 1 TABLET: 5; 325 TABLET ORAL at 21:14

## 2023-07-07 RX ADMIN — ASPIRIN 325 MG: 325 TABLET, COATED ORAL at 21:13

## 2023-07-07 RX ADMIN — ROPINIROLE HYDROCHLORIDE 0.25 MG: 0.25 TABLET, FILM COATED ORAL at 21:14

## 2023-07-07 RX ADMIN — MORPHINE SULFATE 4 MG: 4 INJECTION, SOLUTION INTRAMUSCULAR; INTRAVENOUS at 14:36

## 2023-07-07 RX ADMIN — PIPERACILLIN AND TAZOBACTAM 3375 MG: 3; .375 INJECTION, POWDER, LYOPHILIZED, FOR SOLUTION INTRAVENOUS at 12:26

## 2023-07-07 RX ADMIN — SODIUM CHLORIDE 1000 ML: 9 INJECTION, SOLUTION INTRAVENOUS at 14:48

## 2023-07-07 RX ADMIN — OXYCODONE HYDROCHLORIDE AND ACETAMINOPHEN 1 TABLET: 5; 325 TABLET ORAL at 12:29

## 2023-07-07 RX ADMIN — SODIUM CHLORIDE: 9 INJECTION, SOLUTION INTRAVENOUS at 06:20

## 2023-07-07 RX ADMIN — IMATINIB MESYLATE 300 MG: 100 TABLET, FILM COATED ORAL at 21:13

## 2023-07-07 RX ADMIN — FENTANYL CITRATE 50 MCG: 50 INJECTION, SOLUTION INTRAMUSCULAR; INTRAVENOUS at 02:23

## 2023-07-07 RX ADMIN — MORPHINE SULFATE 4 MG: 4 INJECTION, SOLUTION INTRAMUSCULAR; INTRAVENOUS at 09:00

## 2023-07-07 RX ADMIN — PIPERACILLIN AND TAZOBACTAM 3375 MG: 3; .375 INJECTION, POWDER, LYOPHILIZED, FOR SOLUTION INTRAVENOUS at 21:29

## 2023-07-07 RX ADMIN — MORPHINE SULFATE 4 MG: 4 INJECTION, SOLUTION INTRAMUSCULAR; INTRAVENOUS at 05:15

## 2023-07-07 RX ADMIN — MORPHINE SULFATE 4 MG: 4 INJECTION, SOLUTION INTRAMUSCULAR; INTRAVENOUS at 22:49

## 2023-07-07 RX ADMIN — ACETAMINOPHEN 650 MG: 325 TABLET ORAL at 21:14

## 2023-07-07 RX ADMIN — MORPHINE SULFATE 4 MG: 4 INJECTION, SOLUTION INTRAMUSCULAR; INTRAVENOUS at 03:33

## 2023-07-07 RX ADMIN — PANTOPRAZOLE SODIUM 40 MG: 40 TABLET, DELAYED RELEASE ORAL at 05:15

## 2023-07-07 ASSESSMENT — PAIN DESCRIPTION - LOCATION
LOCATION: HAND
LOCATION: ARM
LOCATION: HAND
LOCATION: HAND;FINGER (COMMENT WHICH ONE)
LOCATION: HAND
LOCATION: HEAD
LOCATION: HAND
LOCATION: FINGER (COMMENT WHICH ONE)

## 2023-07-07 ASSESSMENT — PAIN DESCRIPTION - ORIENTATION
ORIENTATION: RIGHT

## 2023-07-07 ASSESSMENT — PAIN DESCRIPTION - DESCRIPTORS
DESCRIPTORS: SHOOTING
DESCRIPTORS: THROBBING
DESCRIPTORS: THROBBING;TENDER
DESCRIPTORS: DISCOMFORT;ACHING;BURNING
DESCRIPTORS: ACHING
DESCRIPTORS: SHOOTING

## 2023-07-07 ASSESSMENT — ENCOUNTER SYMPTOMS
GASTROINTESTINAL NEGATIVE: 1
RESPIRATORY NEGATIVE: 1
EYES NEGATIVE: 1

## 2023-07-07 ASSESSMENT — PAIN SCALES - GENERAL
PAINLEVEL_OUTOF10: 8
PAINLEVEL_OUTOF10: 7
PAINLEVEL_OUTOF10: 8
PAINLEVEL_OUTOF10: 5
PAINLEVEL_OUTOF10: 8
PAINLEVEL_OUTOF10: 10
PAINLEVEL_OUTOF10: 8
PAINLEVEL_OUTOF10: 10

## 2023-07-07 ASSESSMENT — PAIN - FUNCTIONAL ASSESSMENT: PAIN_FUNCTIONAL_ASSESSMENT: 0-10

## 2023-07-08 ENCOUNTER — APPOINTMENT (OUTPATIENT)
Dept: GENERAL RADIOLOGY | Age: 68
DRG: 605 | End: 2023-07-08
Payer: MEDICARE

## 2023-07-08 LAB
ANION GAP SERPL CALCULATED.3IONS-SCNC: 11 MMOL/L (ref 7–19)
BUN SERPL-MCNC: 9 MG/DL (ref 8–23)
CALCIUM SERPL-MCNC: 8.6 MG/DL (ref 8.8–10.2)
CHLORIDE SERPL-SCNC: 101 MMOL/L (ref 98–111)
CO2 SERPL-SCNC: 27 MMOL/L (ref 22–29)
CREAT SERPL-MCNC: 0.8 MG/DL (ref 0.5–1.2)
ERYTHROCYTE [DISTWIDTH] IN BLOOD BY AUTOMATED COUNT: 13.8 % (ref 11.5–14.5)
GLUCOSE SERPL-MCNC: 99 MG/DL (ref 74–109)
HCT VFR BLD AUTO: 39.1 % (ref 42–52)
HGB BLD-MCNC: 12.8 G/DL (ref 14–18)
MCH RBC QN AUTO: 29.6 PG (ref 27–31)
MCHC RBC AUTO-ENTMCNC: 32.7 G/DL (ref 33–37)
MCV RBC AUTO: 90.5 FL (ref 80–94)
PLATELET # BLD AUTO: 209 K/UL (ref 130–400)
PMV BLD AUTO: 9.4 FL (ref 9.4–12.4)
POTASSIUM SERPL-SCNC: 4 MMOL/L (ref 3.5–5)
RBC # BLD AUTO: 4.32 M/UL (ref 4.7–6.1)
SODIUM SERPL-SCNC: 139 MMOL/L (ref 136–145)
WBC # BLD AUTO: 6.2 K/UL (ref 4.8–10.8)

## 2023-07-08 PROCEDURE — 73010 X-RAY EXAM OF SHOULDER BLADE: CPT

## 2023-07-08 PROCEDURE — 73030 X-RAY EXAM OF SHOULDER: CPT

## 2023-07-08 PROCEDURE — 6360000002 HC RX W HCPCS: Performed by: HOSPITALIST

## 2023-07-08 PROCEDURE — 2580000003 HC RX 258: Performed by: HOSPITALIST

## 2023-07-08 PROCEDURE — 1210000000 HC MED SURG R&B

## 2023-07-08 PROCEDURE — 85027 COMPLETE CBC AUTOMATED: CPT

## 2023-07-08 PROCEDURE — 36415 COLL VENOUS BLD VENIPUNCTURE: CPT

## 2023-07-08 PROCEDURE — 6370000000 HC RX 637 (ALT 250 FOR IP): Performed by: HOSPITALIST

## 2023-07-08 PROCEDURE — 6370000000 HC RX 637 (ALT 250 FOR IP): Performed by: INTERNAL MEDICINE

## 2023-07-08 PROCEDURE — 80048 BASIC METABOLIC PNL TOTAL CA: CPT

## 2023-07-08 PROCEDURE — 94760 N-INVAS EAR/PLS OXIMETRY 1: CPT

## 2023-07-08 RX ADMIN — ASPIRIN 325 MG: 325 TABLET, COATED ORAL at 20:25

## 2023-07-08 RX ADMIN — PIPERACILLIN AND TAZOBACTAM 3375 MG: 3; .375 INJECTION, POWDER, LYOPHILIZED, FOR SOLUTION INTRAVENOUS at 19:22

## 2023-07-08 RX ADMIN — FLUTICASONE PROPIONATE 1 SPRAY: 50 SPRAY, METERED NASAL at 09:08

## 2023-07-08 RX ADMIN — Medication 100 MG: at 09:07

## 2023-07-08 RX ADMIN — FOLIC ACID 1 MG: 1 TABLET ORAL at 09:07

## 2023-07-08 RX ADMIN — SODIUM CHLORIDE, PRESERVATIVE FREE 10 ML: 5 INJECTION INTRAVENOUS at 09:11

## 2023-07-08 RX ADMIN — OXYCODONE HYDROCHLORIDE AND ACETAMINOPHEN 1 TABLET: 5; 325 TABLET ORAL at 03:52

## 2023-07-08 RX ADMIN — ENOXAPARIN SODIUM 40 MG: 100 INJECTION SUBCUTANEOUS at 09:07

## 2023-07-08 RX ADMIN — MORPHINE SULFATE 4 MG: 4 INJECTION, SOLUTION INTRAMUSCULAR; INTRAVENOUS at 15:38

## 2023-07-08 RX ADMIN — SODIUM CHLORIDE: 9 INJECTION, SOLUTION INTRAVENOUS at 15:41

## 2023-07-08 RX ADMIN — MORPHINE SULFATE 4 MG: 4 INJECTION, SOLUTION INTRAMUSCULAR; INTRAVENOUS at 05:47

## 2023-07-08 RX ADMIN — MORPHINE SULFATE 4 MG: 4 INJECTION, SOLUTION INTRAMUSCULAR; INTRAVENOUS at 12:12

## 2023-07-08 RX ADMIN — IMATINIB MESYLATE 300 MG: 100 TABLET, FILM COATED ORAL at 20:25

## 2023-07-08 RX ADMIN — ROPINIROLE HYDROCHLORIDE 0.25 MG: 0.25 TABLET, FILM COATED ORAL at 20:25

## 2023-07-08 RX ADMIN — OXYCODONE HYDROCHLORIDE AND ACETAMINOPHEN 1 TABLET: 5; 325 TABLET ORAL at 18:18

## 2023-07-08 RX ADMIN — FLUTICASONE PROPIONATE 1 SPRAY: 50 SPRAY, METERED NASAL at 21:38

## 2023-07-08 RX ADMIN — CITALOPRAM HYDROBROMIDE 20 MG: 20 TABLET ORAL at 09:07

## 2023-07-08 RX ADMIN — ACETAMINOPHEN 650 MG: 325 TABLET ORAL at 20:25

## 2023-07-08 RX ADMIN — MORPHINE SULFATE 4 MG: 4 INJECTION, SOLUTION INTRAMUSCULAR; INTRAVENOUS at 20:25

## 2023-07-08 RX ADMIN — MORPHINE SULFATE 4 MG: 4 INJECTION, SOLUTION INTRAMUSCULAR; INTRAVENOUS at 09:09

## 2023-07-08 RX ADMIN — SODIUM CHLORIDE, PRESERVATIVE FREE 10 ML: 5 INJECTION INTRAVENOUS at 20:26

## 2023-07-08 RX ADMIN — MORPHINE SULFATE 4 MG: 4 INJECTION, SOLUTION INTRAMUSCULAR; INTRAVENOUS at 23:36

## 2023-07-08 RX ADMIN — PIPERACILLIN AND TAZOBACTAM 3375 MG: 3; .375 INJECTION, POWDER, LYOPHILIZED, FOR SOLUTION INTRAVENOUS at 10:59

## 2023-07-08 RX ADMIN — ACETAMINOPHEN 650 MG: 325 TABLET ORAL at 03:52

## 2023-07-08 RX ADMIN — ASPIRIN 325 MG: 325 TABLET, COATED ORAL at 09:07

## 2023-07-08 RX ADMIN — PIPERACILLIN AND TAZOBACTAM 3375 MG: 3; .375 INJECTION, POWDER, LYOPHILIZED, FOR SOLUTION INTRAVENOUS at 03:52

## 2023-07-08 RX ADMIN — OXYCODONE HYDROCHLORIDE AND ACETAMINOPHEN 1 TABLET: 5; 325 TABLET ORAL at 10:53

## 2023-07-08 RX ADMIN — PANTOPRAZOLE SODIUM 40 MG: 40 TABLET, DELAYED RELEASE ORAL at 05:47

## 2023-07-08 ASSESSMENT — PAIN SCALES - GENERAL
PAINLEVEL_OUTOF10: 4
PAINLEVEL_OUTOF10: 7
PAINLEVEL_OUTOF10: 6
PAINLEVEL_OUTOF10: 8
PAINLEVEL_OUTOF10: 8
PAINLEVEL_OUTOF10: 7
PAINLEVEL_OUTOF10: 9
PAINLEVEL_OUTOF10: 8
PAINLEVEL_OUTOF10: 8

## 2023-07-08 ASSESSMENT — PAIN DESCRIPTION - ORIENTATION
ORIENTATION: RIGHT

## 2023-07-08 ASSESSMENT — PAIN DESCRIPTION - LOCATION
LOCATION: FINGER (COMMENT WHICH ONE)
LOCATION: HAND
LOCATION: HAND
LOCATION: FINGER (COMMENT WHICH ONE)
LOCATION: HAND
LOCATION: HAND
LOCATION: FINGER (COMMENT WHICH ONE)
LOCATION: FINGER (COMMENT WHICH ONE)
LOCATION: HAND

## 2023-07-08 ASSESSMENT — PAIN DESCRIPTION - DESCRIPTORS
DESCRIPTORS: SHOOTING
DESCRIPTORS: STABBING
DESCRIPTORS: THROBBING
DESCRIPTORS: SHOOTING
DESCRIPTORS: ACHING
DESCRIPTORS: ACHING
DESCRIPTORS: SHOOTING
DESCRIPTORS: ACHING

## 2023-07-09 VITALS
SYSTOLIC BLOOD PRESSURE: 143 MMHG | BODY MASS INDEX: 23.9 KG/M2 | OXYGEN SATURATION: 94 % | DIASTOLIC BLOOD PRESSURE: 86 MMHG | WEIGHT: 140 LBS | HEIGHT: 64 IN | RESPIRATION RATE: 16 BRPM | TEMPERATURE: 98.4 F | HEART RATE: 68 BPM

## 2023-07-09 LAB
ANION GAP SERPL CALCULATED.3IONS-SCNC: 10 MMOL/L (ref 7–19)
BUN SERPL-MCNC: 7 MG/DL (ref 8–23)
CALCIUM SERPL-MCNC: 8.8 MG/DL (ref 8.8–10.2)
CHLORIDE SERPL-SCNC: 102 MMOL/L (ref 98–111)
CO2 SERPL-SCNC: 27 MMOL/L (ref 22–29)
CREAT SERPL-MCNC: 0.8 MG/DL (ref 0.5–1.2)
ERYTHROCYTE [DISTWIDTH] IN BLOOD BY AUTOMATED COUNT: 13.8 % (ref 11.5–14.5)
GLUCOSE SERPL-MCNC: 99 MG/DL (ref 74–109)
HCT VFR BLD AUTO: 40.7 % (ref 42–52)
HGB BLD-MCNC: 13.3 G/DL (ref 14–18)
MCH RBC QN AUTO: 29.6 PG (ref 27–31)
MCHC RBC AUTO-ENTMCNC: 32.7 G/DL (ref 33–37)
MCV RBC AUTO: 90.4 FL (ref 80–94)
PLATELET # BLD AUTO: 193 K/UL (ref 130–400)
PMV BLD AUTO: 9.4 FL (ref 9.4–12.4)
POTASSIUM SERPL-SCNC: 3.8 MMOL/L (ref 3.5–5)
RBC # BLD AUTO: 4.5 M/UL (ref 4.7–6.1)
SODIUM SERPL-SCNC: 139 MMOL/L (ref 136–145)
WBC # BLD AUTO: 5.6 K/UL (ref 4.8–10.8)

## 2023-07-09 PROCEDURE — 6370000000 HC RX 637 (ALT 250 FOR IP): Performed by: HOSPITALIST

## 2023-07-09 PROCEDURE — 85027 COMPLETE CBC AUTOMATED: CPT

## 2023-07-09 PROCEDURE — 6370000000 HC RX 637 (ALT 250 FOR IP): Performed by: INTERNAL MEDICINE

## 2023-07-09 PROCEDURE — 6360000002 HC RX W HCPCS: Performed by: HOSPITALIST

## 2023-07-09 PROCEDURE — 36415 COLL VENOUS BLD VENIPUNCTURE: CPT

## 2023-07-09 PROCEDURE — 80048 BASIC METABOLIC PNL TOTAL CA: CPT

## 2023-07-09 PROCEDURE — 2580000003 HC RX 258: Performed by: HOSPITALIST

## 2023-07-09 PROCEDURE — 94760 N-INVAS EAR/PLS OXIMETRY 1: CPT

## 2023-07-09 RX ORDER — AMOXICILLIN AND CLAVULANATE POTASSIUM 875; 125 MG/1; MG/1
1 TABLET, FILM COATED ORAL 2 TIMES DAILY
Qty: 20 TABLET | Refills: 0 | Status: SHIPPED | OUTPATIENT
Start: 2023-07-09 | End: 2023-07-19

## 2023-07-09 RX ORDER — FOLIC ACID 1 MG/1
1 TABLET ORAL DAILY
Qty: 30 TABLET | Refills: 3 | Status: SHIPPED | OUTPATIENT
Start: 2023-07-10

## 2023-07-09 RX ORDER — OXYCODONE HYDROCHLORIDE AND ACETAMINOPHEN 5; 325 MG/1; MG/1
1 TABLET ORAL EVERY 6 HOURS PRN
Qty: 12 TABLET | Refills: 0 | Status: SHIPPED | OUTPATIENT
Start: 2023-07-09 | End: 2023-07-12

## 2023-07-09 RX ADMIN — OXYCODONE HYDROCHLORIDE AND ACETAMINOPHEN 1 TABLET: 5; 325 TABLET ORAL at 00:35

## 2023-07-09 RX ADMIN — ENOXAPARIN SODIUM 40 MG: 100 INJECTION SUBCUTANEOUS at 09:55

## 2023-07-09 RX ADMIN — PANTOPRAZOLE SODIUM 40 MG: 40 TABLET, DELAYED RELEASE ORAL at 05:50

## 2023-07-09 RX ADMIN — OXYCODONE HYDROCHLORIDE AND ACETAMINOPHEN 1 TABLET: 5; 325 TABLET ORAL at 15:29

## 2023-07-09 RX ADMIN — POLYETHYLENE GLYCOL 3350 17 G: 17 POWDER, FOR SOLUTION ORAL at 09:54

## 2023-07-09 RX ADMIN — MORPHINE SULFATE 4 MG: 4 INJECTION, SOLUTION INTRAMUSCULAR; INTRAVENOUS at 09:53

## 2023-07-09 RX ADMIN — ACETAMINOPHEN 650 MG: 325 TABLET ORAL at 09:54

## 2023-07-09 RX ADMIN — MORPHINE SULFATE 4 MG: 4 INJECTION, SOLUTION INTRAMUSCULAR; INTRAVENOUS at 05:50

## 2023-07-09 RX ADMIN — OXYCODONE HYDROCHLORIDE AND ACETAMINOPHEN 1 TABLET: 5; 325 TABLET ORAL at 05:50

## 2023-07-09 RX ADMIN — PIPERACILLIN AND TAZOBACTAM 3375 MG: 3; .375 INJECTION, POWDER, LYOPHILIZED, FOR SOLUTION INTRAVENOUS at 10:09

## 2023-07-09 RX ADMIN — MORPHINE SULFATE 4 MG: 4 INJECTION, SOLUTION INTRAMUSCULAR; INTRAVENOUS at 02:49

## 2023-07-09 RX ADMIN — SODIUM CHLORIDE, PRESERVATIVE FREE 10 ML: 5 INJECTION INTRAVENOUS at 10:03

## 2023-07-09 RX ADMIN — PIPERACILLIN AND TAZOBACTAM 3375 MG: 3; .375 INJECTION, POWDER, LYOPHILIZED, FOR SOLUTION INTRAVENOUS at 02:51

## 2023-07-09 RX ADMIN — Medication 100 MG: at 10:04

## 2023-07-09 RX ADMIN — FOLIC ACID 1 MG: 1 TABLET ORAL at 09:54

## 2023-07-09 RX ADMIN — CITALOPRAM HYDROBROMIDE 20 MG: 20 TABLET ORAL at 09:54

## 2023-07-09 RX ADMIN — ASPIRIN 325 MG: 325 TABLET, COATED ORAL at 09:54

## 2023-07-09 ASSESSMENT — PAIN SCALES - GENERAL
PAINLEVEL_OUTOF10: 9
PAINLEVEL_OUTOF10: 7
PAINLEVEL_OUTOF10: 5
PAINLEVEL_OUTOF10: 5
PAINLEVEL_OUTOF10: 6

## 2023-07-09 ASSESSMENT — PAIN DESCRIPTION - LOCATION
LOCATION: HAND
LOCATION: FINGER (COMMENT WHICH ONE)
LOCATION: FINGER (COMMENT WHICH ONE)
LOCATION: HAND
LOCATION: FINGER (COMMENT WHICH ONE)

## 2023-07-09 ASSESSMENT — ENCOUNTER SYMPTOMS
RESPIRATORY NEGATIVE: 1
EYES NEGATIVE: 1
ALLERGIC/IMMUNOLOGIC NEGATIVE: 1
GASTROINTESTINAL NEGATIVE: 1

## 2023-07-09 ASSESSMENT — PAIN DESCRIPTION - ORIENTATION
ORIENTATION: RIGHT

## 2023-07-09 ASSESSMENT — PAIN DESCRIPTION - DESCRIPTORS
DESCRIPTORS: ACHING
DESCRIPTORS: THROBBING
DESCRIPTORS: ACHING

## 2023-07-27 ENCOUNTER — SPECIALTY PHARMACY (OUTPATIENT)
Dept: ONCOLOGY | Facility: HOSPITAL | Age: 68
End: 2023-07-27
Payer: MEDICARE

## 2023-07-27 NOTE — PROGRESS NOTES
Specialty Pharmacy Refill Coordination Note     Patient Outreach  An attempt was made by the [facility] to contact this patient for a follow-up on their chemotherapy medication. The [facility] can be reached at [phone].      Neena Turner, Pharmacy Technician  Specialty Pharmacy Technician

## 2023-07-28 ENCOUNTER — SPECIALTY PHARMACY (OUTPATIENT)
Dept: ONCOLOGY | Facility: HOSPITAL | Age: 68
End: 2023-07-28
Payer: MEDICARE

## 2023-07-28 NOTE — PROGRESS NOTES
[facility]    Oral Oncology Patient Outreach      Prescription Details  Consulting Provider:   Klever Ferreira MD ([facility])   Medication and Regimen:  Gleevec 300 mg PO daily        An attempt was made by the [facility] to contact this patient for a follow-up on their chemotherapy medication. The [facility] can be reached at [phone].     Attempted to contact patient for follow-up on 7/27/23 and 7/28/23, will defer patient out to 8/28/23 for next telephone consultation.       Electronically signed 07/28/2023 13:35 CDT by:  Kirsten Campos PharmD  Hematology & Oncology Specialty Pharmacist  [facility]  Phone: [phone]

## 2023-08-24 ENCOUNTER — SPECIALTY PHARMACY (OUTPATIENT)
Dept: ONCOLOGY | Facility: HOSPITAL | Age: 68
End: 2023-08-24
Payer: MEDICARE

## 2023-08-24 NOTE — PROGRESS NOTES
"Specialty Pharmacy Refill Coordination Note       Spoke with Susana (spouse) for Mr. Curry monthly telephone follow-up regarding the oral oncology medication. She stated that he has been tolerating the Gleevec well and has not had any obvious side effects from the medication. \"He is doing just fine, we are just staying in and out of this heat\" He has not missed any doses of the medication in the last month. He still receives refills through [facility] , and has not had any delays in getting those refills. She states that there have been no changes to his maintenance medications and no new drug allergies that she is aware of. She stated he has not had any recent stays in the hospital and has not visited the ER in the last month due to the Gleevec . I encouraged her to reach out anytime with any questions or concerns they might have regarding his oral chemotherapy medication.     No needs expressed by the patient. Will follow-up next month regarding the patient's oral chemotherapy with a routine telephone consultation.     Refill Questions      Flowsheet Row Most Recent Value   Changes to allergies? No   Changes to medications? No   New conditions since last clinic visit No   Unplanned office visit, urgent care, ED, or hospital admission in the last 4 weeks  No   How does patient/caregiver feel medication is working? Good   Financial problems or insurance changes  No   Since the previous refill, were any specialty medication doses or scheduled injections missed or delayed?  No   Does this patient require a clinical escalation to a pharmacist? No                       Follow-up: 28-30 day(s)     Neena Turner, Pharmacy Technician  Specialty Pharmacy Technician        "

## 2023-09-22 ENCOUNTER — SPECIALTY PHARMACY (OUTPATIENT)
Dept: ONCOLOGY | Facility: HOSPITAL | Age: 68
End: 2023-09-22
Payer: MEDICARE

## 2023-09-22 NOTE — PROGRESS NOTES
Specialty Pharmacy Refill Coordination Note     Patient Outreach  An attempt was made by the [facility] to contact this patient for a follow-up on their chemotherapy medication. The [facility] can be reached at [phone].      Neena Turner, Pharmacy Technician  Specialty Pharmacy Technician

## 2023-09-26 ENCOUNTER — SPECIALTY PHARMACY (OUTPATIENT)
Dept: ONCOLOGY | Facility: HOSPITAL | Age: 68
End: 2023-09-26
Payer: MEDICARE

## 2023-09-26 NOTE — PROGRESS NOTES
Specialty Pharmacy Refill Coordination Note     Patient Outreach  An attempt was made by the [facility] to contact this patient for a follow-up on their chemotherapy medication. The [facility] can be reached at [phone].      Neena Turner, Pharmacy Technician  Specialty Pharmacy Technician

## 2023-09-27 ENCOUNTER — SPECIALTY PHARMACY (OUTPATIENT)
Dept: ONCOLOGY | Facility: HOSPITAL | Age: 68
End: 2023-09-27
Payer: MEDICARE

## 2023-09-27 NOTE — PROGRESS NOTES
Specialty Pharmacy Refill Coordination Note     Patient Outreach  A second attempt was made by the [facility] to contact this patient for a follow-up on their chemotherapy medication. The [facility] can be reached at [phone].      Neena Turner, Pharmacy Technician  Specialty Pharmacy Technician

## 2023-10-30 ENCOUNTER — SPECIALTY PHARMACY (OUTPATIENT)
Dept: ONCOLOGY | Facility: HOSPITAL | Age: 68
End: 2023-10-30
Payer: MEDICARE

## 2023-10-30 NOTE — PROGRESS NOTES
"Specialty Pharmacy Refill Coordination Note       Spoke with Susana (spouse) for Mr. Curry' monthly telephone follow-up regarding the oral oncology medication. She stated that he has been tolerating the Gleevec well and has not had any obvious side effects from the medication. \"He's doing fine, no changes its all the same\" He has not missed any doses of the medication in the last month. He still receives refills through [facility] , and has not had any delays in getting those refills. He states that there have been no changes to his maintenance medications and no new drug allergies that he is aware of. He stated he has not had any recent stays in the hospital and has not visited the ER in the last month due to the Gleevec .     I encouraged him to reach out anytime with any questions or concerns he might have regarding his oral chemotherapy medication.     No needs expressed by the patient. Will follow-up next month regarding the patient's oral chemotherapy with a routine telephone consultation.     Refill Questions      Flowsheet Row Most Recent Value   Changes to allergies? No   Changes to medications? No   New conditions since last clinic visit No   Unplanned office visit, urgent care, ED, or hospital admission in the last 4 weeks  No   How does patient/caregiver feel medication is working? Good   Financial problems or insurance changes  No   Since the previous refill, were any specialty medication doses or scheduled injections missed or delayed?  No   Does this patient require a clinical escalation to a pharmacist? No                       Follow-up: 28-30 day(s)     Neena Turner, Pharmacy Technician  Specialty Pharmacy Technician        "

## 2023-11-28 ENCOUNTER — SPECIALTY PHARMACY (OUTPATIENT)
Dept: ONCOLOGY | Facility: HOSPITAL | Age: 68
End: 2023-11-28
Payer: MEDICARE

## 2023-11-28 RX ORDER — HYDROCODONE BITARTRATE AND ACETAMINOPHEN 5; 325 MG/1; MG/1
1 TABLET ORAL EVERY 6 HOURS PRN
COMMUNITY

## 2023-11-28 NOTE — PROGRESS NOTES
Specialty Pharmacy Patient Management Program  Oncology 6-Month Clinical Assessment       Cayetano Curry is a 68 y.o. male with CML (chronic myelocytic leukemia) seen today to assess adherence and side effects.    Consulting Provider: Klever Ferreira MD ([facility])  Oral Chemotherapy Regimen: imatinib (Gleevec) 300 mg PO daily  Specialty Pharmacy:  [facility] [address] [address] - [phone] [facility] [phone]       Reason for Outreach: Routine medication check-in .    Oncology/Hematology History Overview Note    Oncology/Hematology History Overview Note   DIAGNOSTIC ABNORMALITIES:  CBC, 12/10/2003. Total WBC of 135,000 with 28% segs, 40% bands, 8% metamyelocytes, 9% myelocytes, 1% blasts, and 2% nucleated RBC. Hemoglobin of 14, a hematocrit of 43.2, and platelet count of 146,000.  Leukocyte alkaline phosphatase, 12/10/2003. 19 (13 to 140).  Peripheral smear, 12/10/2003. Marked leukocytosis with entire spectrum of granulocytic precursors from myeloblasts (1%) to PMNs. Occasional nucleated red blood cell. Mild anisocytosis and poikilocytosis. Adequate platelets.  Marrow biopsy, 12/10/2003. Hypercellular, packed bone marrow.  Marked granulocytic hyperplasia with all stages of maturation including 3% blasts. 1+ stainable iron. Occasional dyspoietic megakaryocytes with normal numbers. Cytometry reveals aberrant CD56 expression on myeloid cells. Cytogenetic studies pending.  Ferritin, B12, and LDH, 12/11/2003. 1309, greater than 2000, and 1963 (all grossly elevated).  Liver/spleen scan, 12/11/2003. Abnormally decreased activity within the spleen. Liver parenchyma shows normal homogenous activity without focal masses. Mild hepatomegaly. No colloid shift.  Chest x-ray, 12/11/2003. Normal.  PREVIOUS INTERVENTIONS:  Gleevec, 12/26/2003 through present. Varying doses. Held 01/30/2004 through 02/27/2004.  Held again (), 12/14/2006 through 12/18/2006.    "  CML (chronic myelocytic leukemia)   12/10/2003 Initial Diagnosis    CML (chronic myelocytic leukemia) (CMS/[facility])     11/12/2020 -  Chemotherapy    OP CML Imatinib 300 mg         Problem List   Medicines reviewed by Kirsten Campos, PharmD on 11/28/2023 at 11:27 AM  Allergies reviewed by Kirsten Campos, PharmD on 11/28/2023 at 11:22 AM  Patient Active Problem List   Diagnosis Code    Anemia D64.9    CML (chronic myelocytic leukemia) C92.10    Hep C w/o coma, chronic B18.2    GERD (gastroesophageal reflux disease) K21.9    Adhesive capsulitis of knee, left M76.892    Bilateral low back pain with right-sided sciatica M54.41    Chronic midline low back pain with bilateral sciatica M54.41, M54.42, G89.29    High risk medications (not anticoagulants) long-term use Z79.899    Hx of lumbosacral spine surgery Z98.890    Hyperlipidemia E78.5    Lumbar facet arthropathy M47.816    Myofascial pain syndrome M79.18    Neck pain M54.2    Cervicalgia M54.2    Cervical radiculopathy M54.12    Former smoker Z87.891    Overweight with body mass index (BMI) of 25 to 25.9 in adult E66.3, Z68.25       Specialty Pharmacy Goal:   Goals Addressed Today        Specialty Pharmacy General Goal      Patient states \"Everything is fine.\" Patient has three pain medications at home due to surgeries, but not currently taking.              Medication Regimen Assessment:  Administration: as prescribed .   Patient can self administer medications: yes  Medication Follow-Up Plan: Refill coordination    Lab(s) Review:  The labs listed below have been reviewed. No dose adjustments are needed for the oral specialty medication(s) based on the labs.    Lab Results   Component Value Date    GLUCOSE 118 (H) 06/26/2023    CALCIUM 9.0 06/26/2023     06/26/2023    K 4.1 06/26/2023    CO2 29.0 06/26/2023     06/26/2023    BUN 8 06/26/2023    CREATININE 0.94 06/26/2023    EGFRIFNONA 75 09/22/2021    BCR 8.5 06/26/2023    ANIONGAP 9.0 06/26/2023 "     Lab Results   Component Value Date    WBC 5.6 07/09/2023    RBC 4.50 (L) 07/09/2023    HGB 13.3 (L) 07/09/2023    HCT 40.7 (L) 07/09/2023    MCV 90.4 07/09/2023    MCH 29.6 07/09/2023    MCHC 32.7 (L) 07/09/2023    RDW 13.8 07/09/2023    RDWSD 49.0 06/26/2023    MPV 9.4 07/09/2023     07/09/2023    NEUTRORELPCT 77.7 (H) 07/07/2023    LYMPHORELPCT 10.8 (L) 07/07/2023    MONORELPCT 7.7 07/07/2023    EOSRELPCT 2.6 07/07/2023    BASORELPCT 0.8 07/07/2023    AUTOIGPER 0.2 06/26/2023    NEUTROABS 6.6 07/07/2023    LYMPHSABS 0.9 (L) 07/07/2023    MONOSABS 0.70 07/07/2023    EOSABS 0.20 07/07/2023    BASOSABS 0.10 07/07/2023    AUTOIGNUM 0.0 07/07/2023    NRBC 0.0 06/26/2023       Drug-Drug Interaction(s) Assessment:  Completed medication reconciliation today to assess for drug interactions. Patient denies starting or stopping any medications.    Assessed medication list for interactions, no significant drug interactions noted.   Advised patient to call the clinic if any new medications are started so we can assess for drug-drug interactions.  Drug-food interactions discussed: eating grapefruit and drinking grapefruit juice  Vaccines are coordinated by the patient's oncologist and primary care provider.    Medication(s) Review:   Medicines reviewed by Kirsten Campos, PharmD on 11/28/2023 at 11:27 AM  Prior to Admission medications    Medication Sig Start Date End Date Taking? Authorizing Provider   HYDROcodone-acetaminophen (NORCO) 5-325 MG per tablet Take 1 tablet by mouth Every 6 (Six) Hours As Needed for Other (Surgery Pain). As needed   Yes Provider, MD Kelsey   imatinib (GLEEVEC) 100 MG chemo tablet Take 3 tablets by mouth Daily. 5/11/23  Yes Klever Ferreira MD   omeprazole (priLOSEC) 40 MG capsule Take 1 capsule by mouth Daily. 5/17/19  Yes Provider, MD Kelsey   oxyCODONE (ROXICODONE) 5 MG immediate release tablet Take 1 tablet by mouth Every 8 (Eight) Hours As Needed for Severe Pain or  Moderate Pain. As needed 6/19/23  Yes Kelsey Champion MD   traMADol (ULTRAM) 50 MG tablet 1 tablet Every 8 (Eight) Hours As Needed for Moderate Pain. As needed. 5/9/23  Yes Kelsey Champion MD   citalopram (CeleXA) 40 MG tablet Take 1 tablet by mouth Daily.  Patient not taking: Reported on 11/28/2023 5/17/19   Kelsey Champion MD   fluticasone (FLONASE) 50 MCG/ACT nasal spray 2 sprays into the nostril(s) as directed by provider Daily. 4/4/19 11/28/23  Kelsey Champion MD       Allergy Review:  Known allergies and reactions were discussed with the patient. The patient's chart has been reviewed for allergy information and updated as necessary.   No Known Allergies      Allergies reviewed by Kirsten Campos, PharmD on 11/28/2023 at 11:22 AM    Hospitalizations and Urgent Care Visits Since Last Visit Assessment:  Unplanned hospitalizations or inpatient admissions: no  ED Visits: no  Urgent Office Visits: no    Adherence Assessment:  No adherence issues noted during 6-month clinical discussion.    Quality of Life Assessment:  Quality of Life: 7/10    Financial Assessment:  Medication availability/affordability: Patient has had no issues obtaining medication from pharmacy.    Follow-Up(s):  No needs expressed by the patient or otherwise identified. Will follow-up next month regarding the patient's oral chemotherapy with a routine telephone consultation. The next routine follow-up will be scheduled approximately 28-30 days from today. Will conduct the next in-person clinical assessment visit within the next 6 months.      Attestation:  I attest the patient was actively involved in and has agreed to the above plan of care. If the prescribed therapy is at any point deemed not appropriate based on the current or future assessments, a consultation will be initiated with the patient's specialty care provider to determine the best course of action. The revised plan of therapy will be documented along with  any required assessments and/or additional patient education provided.     Finally, all questions and concerns today were addressed to the best of my knowledge within the 6-month clinical assessment office visit. Patient verbalized they had the Specialty Pharmacy Services contact information for any future concerns or questions.         Electronically signed 11/28/2023 11:31 CST by:  Kirsten Campos PharmD  Hematology & Oncology Specialty Pharmacist  [facility]  Phone: [phone]

## 2023-12-19 ENCOUNTER — SPECIALTY PHARMACY (OUTPATIENT)
Dept: ONCOLOGY | Facility: HOSPITAL | Age: 68
End: 2023-12-19
Payer: MEDICARE

## 2023-12-19 NOTE — PROGRESS NOTES
"Specialty Pharmacy Refill Coordination Note       Spoke with Susana (spouse) for Mr. Curry monthly telephone follow-up regarding the oral oncology medication. She stated that he has been tolerating the Gleevec well and has not had any obvious side effects from the medication. \"He's doing pretty good I guess, no issues\" He has not missed any doses of the medication in the last month. He still receives refills through [facility] , and has not had any delays in getting those refills. She states that there have been no changes to his maintenance medications and no new drug allergies that she is aware of. She stated he has not had any recent stays in the hospital and has not visited the ER in the last month due to the Gleevec .     I encouraged her to reach out anytime with any questions or concerns they might have regarding his oral chemotherapy medication.     No needs expressed by the patient. Will follow-up next month regarding the patient's oral chemotherapy with a routine telephone consultation.     Refill Questions      Flowsheet Row Most Recent Value   Changes to allergies? No   Changes to medications? No   New conditions or infections since last clinic visit No   Unplanned office visit, urgent care, ED, or hospital admission in the last 4 weeks  No   How does patient/caregiver feel medication is working? Good   Financial problems or insurance changes  No   Since the previous refill, were any specialty medication doses or scheduled injections missed or delayed?  No   Does this patient require a clinical escalation to a pharmacist? No            Delivery Questions      Flowsheet Row Most Recent Value   Delivery method Other (Comment)   Delivery address verified with patient/caregiver? No   Delivery address Other (Comment)   Number of medications in delivery 0   Medication(s) being filled and delivered No medications found.   Doses left of specialty medications Patient fills medication through external " pharmacy   Copay verified? No   Copay amount Patient fills medication through external pharmacy.   Copay form of payment No copayment ($0)                   Follow-up: 28-30 day(s)     Neena Turner, Pharmacy Technician  Specialty Pharmacy Technician

## 2024-01-03 ENCOUNTER — LAB (OUTPATIENT)
Dept: LAB | Facility: HOSPITAL | Age: 69
End: 2024-01-03
Payer: MEDICARE

## 2024-01-03 DIAGNOSIS — C92.10 CML (CHRONIC MYELOCYTIC LEUKEMIA): ICD-10-CM

## 2024-01-03 LAB
ALBUMIN SERPL-MCNC: 4.6 G/DL (ref 3.5–5.2)
ALBUMIN/GLOB SERPL: 1.8 G/DL
ALP SERPL-CCNC: 89 U/L (ref 39–117)
ALT SERPL W P-5'-P-CCNC: 62 U/L (ref 1–41)
ANION GAP SERPL CALCULATED.3IONS-SCNC: 10 MMOL/L (ref 5–15)
AST SERPL-CCNC: 65 U/L (ref 1–40)
BASOPHILS # BLD AUTO: 0.07 10*3/MM3 (ref 0–0.2)
BASOPHILS NFR BLD AUTO: 1.3 % (ref 0–1.5)
BILIRUB SERPL-MCNC: 0.7 MG/DL (ref 0–1.2)
BUN SERPL-MCNC: 9 MG/DL (ref 8–23)
BUN/CREAT SERPL: 9.7 (ref 7–25)
CALCIUM SPEC-SCNC: 9 MG/DL (ref 8.6–10.5)
CHLORIDE SERPL-SCNC: 102 MMOL/L (ref 98–107)
CO2 SERPL-SCNC: 28 MMOL/L (ref 22–29)
CREAT SERPL-MCNC: 0.93 MG/DL (ref 0.76–1.27)
DEPRECATED RDW RBC AUTO: 45.1 FL (ref 37–54)
EGFRCR SERPLBLD CKD-EPI 2021: 90.6 ML/MIN/1.73
EOSINOPHIL # BLD AUTO: 0.81 10*3/MM3 (ref 0–0.4)
EOSINOPHIL NFR BLD AUTO: 15.3 % (ref 0.3–6.2)
ERYTHROCYTE [DISTWIDTH] IN BLOOD BY AUTOMATED COUNT: 13.2 % (ref 12.3–15.4)
FERRITIN SERPL-MCNC: 137.5 NG/ML (ref 30–400)
GLOBULIN UR ELPH-MCNC: 2.6 GM/DL
GLUCOSE SERPL-MCNC: 102 MG/DL (ref 65–99)
HCT VFR BLD AUTO: 48.3 % (ref 37.5–51)
HGB BLD-MCNC: 15.3 G/DL (ref 13–17.7)
IMM GRANULOCYTES # BLD AUTO: 0.01 10*3/MM3 (ref 0–0.05)
IMM GRANULOCYTES NFR BLD AUTO: 0.2 % (ref 0–0.5)
IRON 24H UR-MRATE: 95 MCG/DL (ref 59–158)
IRON SATN MFR SERPL: 24 % (ref 20–50)
LYMPHOCYTES # BLD AUTO: 1.21 10*3/MM3 (ref 0.7–3.1)
LYMPHOCYTES NFR BLD AUTO: 22.8 % (ref 19.6–45.3)
MCH RBC QN AUTO: 29.2 PG (ref 26.6–33)
MCHC RBC AUTO-ENTMCNC: 31.7 G/DL (ref 31.5–35.7)
MCV RBC AUTO: 92.2 FL (ref 79–97)
MONOCYTES # BLD AUTO: 0.41 10*3/MM3 (ref 0.1–0.9)
MONOCYTES NFR BLD AUTO: 7.7 % (ref 5–12)
NEUTROPHILS NFR BLD AUTO: 2.79 10*3/MM3 (ref 1.7–7)
NEUTROPHILS NFR BLD AUTO: 52.7 % (ref 42.7–76)
NRBC BLD AUTO-RTO: 0 /100 WBC (ref 0–0.2)
PHOSPHATE SERPL-MCNC: 3.2 MG/DL (ref 2.5–4.5)
PLATELET # BLD AUTO: 245 10*3/MM3 (ref 140–450)
PMV BLD AUTO: 10 FL (ref 6–12)
POTASSIUM SERPL-SCNC: 4.4 MMOL/L (ref 3.5–5.2)
PROT SERPL-MCNC: 7.2 G/DL (ref 6–8.5)
RBC # BLD AUTO: 5.24 10*6/MM3 (ref 4.14–5.8)
SODIUM SERPL-SCNC: 140 MMOL/L (ref 136–145)
TIBC SERPL-MCNC: 399 MCG/DL (ref 298–536)
TRANSFERRIN SERPL-MCNC: 268 MG/DL (ref 200–360)
WBC NRBC COR # BLD AUTO: 5.3 10*3/MM3 (ref 3.4–10.8)

## 2024-01-03 PROCEDURE — 84100 ASSAY OF PHOSPHORUS: CPT

## 2024-01-03 PROCEDURE — 36415 COLL VENOUS BLD VENIPUNCTURE: CPT

## 2024-01-03 PROCEDURE — 84466 ASSAY OF TRANSFERRIN: CPT

## 2024-01-03 PROCEDURE — 80053 COMPREHEN METABOLIC PANEL: CPT

## 2024-01-03 PROCEDURE — 85025 COMPLETE CBC W/AUTO DIFF WBC: CPT

## 2024-01-03 PROCEDURE — 82728 ASSAY OF FERRITIN: CPT

## 2024-01-03 PROCEDURE — 83540 ASSAY OF IRON: CPT
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
## 2024-01-09 ENCOUNTER — OFFICE VISIT (OUTPATIENT)
Dept: ONCOLOGY | Facility: CLINIC | Age: 69
End: 2024-01-09
Payer: MEDICARE

## 2024-01-09 VITALS
OXYGEN SATURATION: 98 % | DIASTOLIC BLOOD PRESSURE: 58 MMHG | TEMPERATURE: 97.6 F | HEIGHT: 63 IN | SYSTOLIC BLOOD PRESSURE: 110 MMHG | RESPIRATION RATE: 18 BRPM | HEART RATE: 94 BPM | WEIGHT: 138.1 LBS | BODY MASS INDEX: 24.47 KG/M2

## 2024-01-09 DIAGNOSIS — K13.79 MOUTH PAIN: Primary | ICD-10-CM

## 2024-01-09 DIAGNOSIS — C92.10 CML (CHRONIC MYELOCYTIC LEUKEMIA): Primary | ICD-10-CM

## 2024-01-14 LAB
INTERPRETATION: NEGATIVE
LAB DIRECTOR NAME PROVIDER: NORMAL
LABORATORY COMMENT REPORT: NORMAL
REF LAB TEST METHOD: NORMAL
T(ABL1,BCR)B2A2/CONTROL BLD/T: NORMAL %
T(ABL1,BCR)B3A2/CONTROL BLD/T: NORMAL %
T(ABL1,BCR)E1A2/CONTROL BLD/T: NORMAL %

## 2024-01-24 ENCOUNTER — SPECIALTY PHARMACY (OUTPATIENT)
Dept: ONCOLOGY | Facility: HOSPITAL | Age: 69
End: 2024-01-24
Payer: MEDICARE

## 2024-01-24 NOTE — PROGRESS NOTES
Specialty Pharmacy Refill Coordination Note     Patient Outreach  An attempt was made by the [facility] to contact this patient for a follow-up on their chemotherapy medication. The [facility] can be reached at [phone].         Neena Turner, Pharmacy Technician  Specialty Pharmacy Technician

## 2024-01-25 ENCOUNTER — SPECIALTY PHARMACY (OUTPATIENT)
Dept: ONCOLOGY | Facility: HOSPITAL | Age: 69
End: 2024-01-25
Payer: MEDICARE

## 2024-01-25 NOTE — PROGRESS NOTES
Specialty Pharmacy Refill Coordination Note       Patient Outreach  A second attempt was made by the [facility] to contact this patient for a follow-up on their chemotherapy medication. [facility] can be reached at [phone].      Neena Turner, Pharmacy Technician  Specialty Pharmacy Technician

## 2024-02-22 ENCOUNTER — SPECIALTY PHARMACY (OUTPATIENT)
Dept: ONCOLOGY | Facility: HOSPITAL | Age: 69
End: 2024-02-22
Payer: MEDICARE

## 2024-02-22 NOTE — PROGRESS NOTES
Specialty Pharmacy Refill Coordination Note       Patient Outreach  An attempt was made by the [facility] to contact this patient for a follow-up on their chemotherapy medication. [facility] can be reached at [phone].      Neena Turner, Pharmacy Technician  Specialty Pharmacy Technician

## 2024-02-28 ENCOUNTER — SPECIALTY PHARMACY (OUTPATIENT)
Dept: ONCOLOGY | Facility: HOSPITAL | Age: 69
End: 2024-02-28
Payer: MEDICARE

## 2024-02-28 NOTE — PROGRESS NOTES
[facility]    Oral Oncology Patient Outreach      Prescription Details  Consulting Provider:   Klever Ferreira MD ([facility])   Medication and Regimen:  imatinib [Gleevec] 300 mg PO daily        An attempt was made by the [facility] to contact this patient for a follow-up on their chemotherapy medication. [facility] can be reached at [phone].     Attempted to contact patient for follow-up on 2/22/24 and 2/28/24, will defer patient out to 3/25/24 for next telephone consultation.       Electronically signed 02/28/2024 12:12 CST by:  Kirsten Campos PharmD  Hematology & Oncology Specialty Pharmacist  [facility]  Phone: [phone]

## 2024-03-21 ENCOUNTER — SPECIALTY PHARMACY (OUTPATIENT)
Dept: ONCOLOGY | Facility: HOSPITAL | Age: 69
End: 2024-03-21
Payer: MEDICARE

## 2024-03-21 NOTE — PROGRESS NOTES
Specialty Pharmacy Refill Coordination Note     Patient Outreach  An attempt was made by the [facility] to contact this patient for a follow-up on their chemotherapy medication. [facility] can be reached at [phone].      Neena Turner, Pharmacy Technician  Specialty Pharmacy Technician

## 2024-03-27 ENCOUNTER — SPECIALTY PHARMACY (OUTPATIENT)
Dept: ONCOLOGY | Facility: HOSPITAL | Age: 69
End: 2024-03-27
Payer: MEDICARE

## 2024-03-27 ENCOUNTER — PATIENT OUTREACH (OUTPATIENT)
Dept: ONCOLOGY | Facility: HOSPITAL | Age: 69
End: 2024-03-27
Payer: MEDICARE

## 2024-03-28 ENCOUNTER — TELEPHONE (OUTPATIENT)
Dept: PULMONOLOGY | Facility: CLINIC | Age: 69
End: 2024-03-28
Payer: MEDICARE

## 2024-03-28 NOTE — TELEPHONE ENCOUNTER
Called patient to reschedule their no show appointment that was originally scheduled on 03/19/2024 .      Unable to contact patient after trying all available phone numbers. No Show letter was mailed, which includes [employer] No Show Policy.

## 2024-04-18 ENCOUNTER — SPECIALTY PHARMACY (OUTPATIENT)
Dept: ONCOLOGY | Facility: HOSPITAL | Age: 69
End: 2024-04-18
Payer: MEDICARE

## 2024-04-18 NOTE — PROGRESS NOTES
Specialty Pharmacy Refill Coordination Note     Patient Outreach  An attempt was made by the [facility] to contact this patient for a follow-up on their chemotherapy medication. [facility] can be reached at [phone].      Neena Turner, Pharmacy Technician  Specialty Pharmacy Technician

## 2024-04-19 ENCOUNTER — SPECIALTY PHARMACY (OUTPATIENT)
Dept: ONCOLOGY | Facility: HOSPITAL | Age: 69
End: 2024-04-19
Payer: MEDICARE

## 2024-04-19 NOTE — PROGRESS NOTES
Specialty Pharmacy Refill Coordination Note     Patient Outreach  A second attempt was made by the [facility] to contact this patient for a follow-up on their chemotherapy medication. [facility] can be reached at [phone].         Neena Turner, Pharmacy Technician  Specialty Pharmacy Technician

## 2024-05-31 ENCOUNTER — SPECIALTY PHARMACY (OUTPATIENT)
Dept: ONCOLOGY | Facility: HOSPITAL | Age: 69
End: 2024-05-31
Payer: MEDICARE

## 2024-05-31 NOTE — PROGRESS NOTES
Specialty Pharmacy Refill Coordination Note     Patient Outreach  An attempt was made by the [facility] to contact this patient for a follow-up on their chemotherapy medication. [facility] can be reached at [phone].        Neena Turner, Pharmacy Technician  Specialty Pharmacy Technician

## 2024-06-03 ENCOUNTER — SPECIALTY PHARMACY (OUTPATIENT)
Dept: ONCOLOGY | Facility: HOSPITAL | Age: 69
End: 2024-06-03
Payer: MEDICARE

## 2024-06-03 NOTE — PROGRESS NOTES
Specialty Pharmacy Refill Coordination Note     Patient Outreach  An attempt was made by the [facility] to contact this patient for a follow-up on their chemotherapy medication. [facility] can be reached at [phone].       Katie Davila, Pharmacy Technician  Specialty Pharmacy Technician

## 2024-06-05 ENCOUNTER — SPECIALTY PHARMACY (OUTPATIENT)
Dept: ONCOLOGY | Facility: HOSPITAL | Age: 69
End: 2024-06-05
Payer: MEDICARE

## 2024-06-05 NOTE — PROGRESS NOTES
Specialty Pharmacy Refill Coordination Note     Patient Outreach  A second attempt was made by the [facility] to contact this patient for a follow-up on their chemotherapy medication. [facility] can be reached at [phone].        Neena Turner, Pharmacy Technician  Specialty Pharmacy Technician

## 2024-06-27 ENCOUNTER — SPECIALTY PHARMACY (OUTPATIENT)
Dept: ONCOLOGY | Facility: HOSPITAL | Age: 69
End: 2024-06-27
Payer: MEDICARE

## 2024-06-27 NOTE — PROGRESS NOTES
Specialty Pharmacy Refill Coordination Note     Patient Outreach  An attempt was made by the [facility] to contact this patient for a follow-up on their chemotherapy medication. [facility] can be reached at [phone].        Neena Turner, Pharmacy Technician  Specialty Pharmacy Technician

## 2024-06-28 ENCOUNTER — SPECIALTY PHARMACY (OUTPATIENT)
Dept: ONCOLOGY | Facility: HOSPITAL | Age: 69
End: 2024-06-28
Payer: MEDICARE

## 2024-06-28 NOTE — PROGRESS NOTES
Specialty Pharmacy Refill Coordination Note     Patient Outreach  A second attempt was made by the [facility] to contact this patient for a follow-up on their chemotherapy medication. [facility] can be reached at [phone].        Neena Turner, Pharmacy Technician  Specialty Pharmacy Technician

## 2024-07-09 ENCOUNTER — TELEPHONE (OUTPATIENT)
Dept: ONCOLOGY | Facility: CLINIC | Age: 69
End: 2024-07-09

## 2024-07-09 NOTE — TELEPHONE ENCOUNTER
----- Message from Klever Ferreira sent at 7/8/2024 10:07 PM CDT -----  Regarding: needs labs  Will need to draw today then move his visit out another week for results of RT-PCR to be available:    pre-office RT-PCR for BCR-ABL, serum iron, Fe saturation, ferritin, CMP, phosphorus, and CBC with differential

## 2024-07-10 DIAGNOSIS — C92.10 CML (CHRONIC MYELOCYTIC LEUKEMIA): ICD-10-CM

## 2024-07-10 NOTE — TELEPHONE ENCOUNTER
"Caller: Cayetano Curry \"OCTAVIO\"    Relationship: Self    Best call back number: [phone]    Requested Prescriptions:   Requested Prescriptions     Pending Prescriptions Disp Refills    imatinib (GLEEVEC) 100 MG chemo tablet 90 tablet 6     Sig: Take 3 tablets by mouth Daily.        Pharmacy where request should be sent: [facility] [employer] [address] - [phone] [facility] [phone]     Last office visit with prescribing clinician: 1/9/2024   Last telemedicine visit with prescribing clinician: Visit date not found   Next office visit with prescribing clinician: Visit date not found     Additional details provided by patient: PATIENT ALSO NEEDS TO R/S LAB & F/U 1 APPTS.    Does the patient have less than a 3 day supply:  [x] Yes  [] No    Would you like a call back once the refill request has been completed: [] Yes [x] No    If the office needs to give you a call back, can they leave a voicemail: [] Yes [x] No      "

## 2024-07-11 DIAGNOSIS — C92.10 CML (CHRONIC MYELOCYTIC LEUKEMIA): Primary | ICD-10-CM

## 2024-07-11 RX ORDER — IMATINIB MESYLATE 100 MG/1
300 TABLET, FILM COATED ORAL DAILY
Qty: 90 TABLET | Refills: 6 | Status: SHIPPED | OUTPATIENT
Start: 2024-07-11

## 2024-07-11 RX ORDER — IMATINIB MESYLATE 100 MG/1
300 TABLET, FILM COATED ORAL DAILY
Qty: 90 TABLET | Refills: 11 | Status: SHIPPED | OUTPATIENT
Start: 2024-07-11

## 2024-07-24 ENCOUNTER — SPECIALTY PHARMACY (OUTPATIENT)
Dept: ONCOLOGY | Facility: HOSPITAL | Age: 69
End: 2024-07-24
Payer: MEDICARE

## 2024-07-24 NOTE — PROGRESS NOTES
Specialty Pharmacy Refill Coordination Note     Patient Outreach  An attempt was made by the [facility] to contact this patient for a follow-up on their chemotherapy medication. [facility] can be reached at [phone].        Neena Turner, Pharmacy Technician  Specialty Pharmacy Technician

## 2024-07-25 ENCOUNTER — SPECIALTY PHARMACY (OUTPATIENT)
Dept: ONCOLOGY | Facility: HOSPITAL | Age: 69
End: 2024-07-25
Payer: MEDICARE

## 2024-07-25 NOTE — PROGRESS NOTES
Specialty Pharmacy Refill Coordination Note     Patient Outreach  A second attempt was made by the [facility] to contact this patient for a follow-up on their chemotherapy medication. [facility] can be reached at [phone].        Neena Turner, Pharmacy Technician  Specialty Pharmacy Technician

## 2024-08-22 ENCOUNTER — SPECIALTY PHARMACY (OUTPATIENT)
Dept: ONCOLOGY | Facility: HOSPITAL | Age: 69
End: 2024-08-22
Payer: MEDICARE

## 2024-08-22 NOTE — PROGRESS NOTES
Specialty Pharmacy Refill Coordination Note     Patient Outreach  An attempt was made by the [facility] to contact this patient for a follow-up on their chemotherapy medication. [facility] can be reached at [phone].        Neena Turner, Pharmacy Technician  Specialty Pharmacy Technician

## 2024-09-19 ENCOUNTER — LAB (OUTPATIENT)
Dept: LAB | Facility: HOSPITAL | Age: 69
End: 2024-09-19
Payer: MEDICARE

## 2024-09-19 DIAGNOSIS — C92.10 CML (CHRONIC MYELOCYTIC LEUKEMIA): ICD-10-CM

## 2024-09-19 LAB
ALBUMIN SERPL-MCNC: 4.2 G/DL (ref 3.5–5.2)
ALBUMIN/GLOB SERPL: 1.8 G/DL
ALP SERPL-CCNC: 67 U/L (ref 39–117)
ALT SERPL W P-5'-P-CCNC: 33 U/L (ref 1–41)
ANION GAP SERPL CALCULATED.3IONS-SCNC: 9 MMOL/L (ref 5–15)
AST SERPL-CCNC: 43 U/L (ref 1–40)
BASOPHILS # BLD AUTO: 0.08 10*3/MM3 (ref 0–0.2)
BASOPHILS NFR BLD AUTO: 1.6 % (ref 0–1.5)
BILIRUB SERPL-MCNC: 0.6 MG/DL (ref 0–1.2)
BUN SERPL-MCNC: 9 MG/DL (ref 8–23)
BUN/CREAT SERPL: 8.3 (ref 7–25)
CALCIUM SPEC-SCNC: 8.9 MG/DL (ref 8.6–10.5)
CHLORIDE SERPL-SCNC: 102 MMOL/L (ref 98–107)
CO2 SERPL-SCNC: 29 MMOL/L (ref 22–29)
CREAT SERPL-MCNC: 1.08 MG/DL (ref 0.76–1.27)
DEPRECATED RDW RBC AUTO: 46.9 FL (ref 37–54)
EGFRCR SERPLBLD CKD-EPI 2021: 75.2 ML/MIN/1.73
EOSINOPHIL # BLD AUTO: 0.56 10*3/MM3 (ref 0–0.4)
EOSINOPHIL NFR BLD AUTO: 11.2 % (ref 0.3–6.2)
ERYTHROCYTE [DISTWIDTH] IN BLOOD BY AUTOMATED COUNT: 13.8 % (ref 12.3–15.4)
FERRITIN SERPL-MCNC: 83.94 NG/ML (ref 30–400)
GLOBULIN UR ELPH-MCNC: 2.4 GM/DL
GLUCOSE SERPL-MCNC: 114 MG/DL (ref 65–99)
HCT VFR BLD AUTO: 43.8 % (ref 37.5–51)
HGB BLD-MCNC: 14.3 G/DL (ref 13–17.7)
IMM GRANULOCYTES # BLD AUTO: 0.01 10*3/MM3 (ref 0–0.05)
IMM GRANULOCYTES NFR BLD AUTO: 0.2 % (ref 0–0.5)
IRON 24H UR-MRATE: 68 MCG/DL (ref 59–158)
IRON SATN MFR SERPL: 18 % (ref 20–50)
LYMPHOCYTES # BLD AUTO: 1.34 10*3/MM3 (ref 0.7–3.1)
LYMPHOCYTES NFR BLD AUTO: 26.7 % (ref 19.6–45.3)
MCH RBC QN AUTO: 30.2 PG (ref 26.6–33)
MCHC RBC AUTO-ENTMCNC: 32.6 G/DL (ref 31.5–35.7)
MCV RBC AUTO: 92.6 FL (ref 79–97)
MONOCYTES # BLD AUTO: 0.49 10*3/MM3 (ref 0.1–0.9)
MONOCYTES NFR BLD AUTO: 9.8 % (ref 5–12)
NEUTROPHILS NFR BLD AUTO: 2.53 10*3/MM3 (ref 1.7–7)
NEUTROPHILS NFR BLD AUTO: 50.5 % (ref 42.7–76)
NRBC BLD AUTO-RTO: 0 /100 WBC (ref 0–0.2)
PHOSPHATE SERPL-MCNC: 2.4 MG/DL (ref 2.5–4.5)
PLATELET # BLD AUTO: 215 10*3/MM3 (ref 140–450)
PMV BLD AUTO: 9.7 FL (ref 6–12)
POTASSIUM SERPL-SCNC: 4.4 MMOL/L (ref 3.5–5.2)
PROT SERPL-MCNC: 6.6 G/DL (ref 6–8.5)
RBC # BLD AUTO: 4.73 10*6/MM3 (ref 4.14–5.8)
SODIUM SERPL-SCNC: 140 MMOL/L (ref 136–145)
TIBC SERPL-MCNC: 374 MCG/DL (ref 298–536)
TRANSFERRIN SERPL-MCNC: 251 MG/DL (ref 200–360)
WBC NRBC COR # BLD AUTO: 5.01 10*3/MM3 (ref 3.4–10.8)

## 2024-09-19 PROCEDURE — 85025 COMPLETE CBC W/AUTO DIFF WBC: CPT

## 2024-09-19 PROCEDURE — 84100 ASSAY OF PHOSPHORUS: CPT

## 2024-09-19 PROCEDURE — 83540 ASSAY OF IRON: CPT

## 2024-09-19 PROCEDURE — 82728 ASSAY OF FERRITIN: CPT

## 2024-09-19 PROCEDURE — 36415 COLL VENOUS BLD VENIPUNCTURE: CPT

## 2024-09-19 PROCEDURE — 84466 ASSAY OF TRANSFERRIN: CPT

## 2024-09-19 PROCEDURE — 80053 COMPREHEN METABOLIC PANEL: CPT

## 2024-09-26 ENCOUNTER — OFFICE VISIT (OUTPATIENT)
Dept: ONCOLOGY | Facility: CLINIC | Age: 69
End: 2024-09-26
Payer: MEDICARE

## 2024-09-26 VITALS
SYSTOLIC BLOOD PRESSURE: 124 MMHG | HEIGHT: 63 IN | OXYGEN SATURATION: 96 % | HEART RATE: 86 BPM | TEMPERATURE: 97.8 F | DIASTOLIC BLOOD PRESSURE: 64 MMHG | BODY MASS INDEX: 24.43 KG/M2 | RESPIRATION RATE: 18 BRPM | WEIGHT: 137.9 LBS

## 2024-09-26 DIAGNOSIS — C92.10 CML (CHRONIC MYELOCYTIC LEUKEMIA): Primary | ICD-10-CM

## 2024-09-26 RX ORDER — FERROUS SULFATE 325(65) MG
325 TABLET ORAL
Qty: 30 TABLET | Refills: 6 | Status: SHIPPED | OUTPATIENT
Start: 2024-09-26

## 2024-09-26 RX ORDER — HYDROCODONE BITARTRATE AND ACETAMINOPHEN 7.5; 325 MG/1; MG/1
1 TABLET ORAL EVERY 8 HOURS PRN
COMMUNITY

## 2025-02-24 NOTE — OUTREACH NOTE
[facility]    Oral Oncology Patient Outreach      Prescription Details  Consulting Provider:   Klever Ferreira MD ([facility])   Medication and Regimen:  Gleevec       A 2nd attempt was made by the [facility] to contact this patient for a follow-up on their chemotherapy medication. [facility] can be reached at [phone]. Will re-attempt next month       Electronically signed 03/27/2024 17:07 CDT by:  Micah Fox, PharmD  Specialty Pharmacist - Hematology & Oncology  [facility]  [phone]     Pediatric [facility] Medicine Progress Note     Date: 2025 / Time: 6:23 AM     Patient:  Billy Taylor - 5 y.o. male  CONSULTANTS: None    Hospital Day: Hospital Day: 4    SUBJECTIVE:   No acute events overnight. Afebrile and vitally stable. Good urine output.   Discussed with RN about poor PO intake of fluids and solids. Required only 0.5 unit insulin for dinner. His pain has been well controlled with tylenol and motrin.   Dad reports that he has not had breakfast this morning and is trying to finish yesterday's water bottle.     OBJECTIVE:   Vitals:    Temp (24hrs), Av.9 °C (98.4 °F), Min:36.4 °C (97.6 °F), Max:37.7 °C (99.8 °F)     Oxygen: Pulse Oximetry: 95 %, O2 (LPM): 0, O2 Delivery Device: None - Room Air  Patient Vitals for the past 24 hrs:   BP Systolic BP Percentile Diastolic BP Percentile Temp Temp src Pulse Resp SpO2   25 0400 -- -- -- 36.7 °C (98.1 °F) Temporal 93 30 95 %   25 0000 -- -- -- 36.6 °C (97.8 °F) Temporal 100 32 95 %   25 2000 (!) 106/74 94 % (!) 99 % 37.7 °C (99.8 °F) Temporal 91 28 95 %   25 1637 -- -- -- 36.7 °C (98 °F) Temporal 109 28 96 %   25 1151 -- -- -- 36.4 °C (97.6 °F) Temporal 108 28 94 %   25 0835 (!) 110/62 (!) 96 % 92 % 37.1 °C (98.8 °F) Temporal 109 (!) 20 98 %     15.6 kg (34 lb 6.3 oz)      In/Out:      IV Fluids/Diet: NS with KCL 0-50 ml/hr (0-80 ml/hr for ketone protocol)  Lines/Tubes: PIV    Physical Exam   Gen:  NAD, sleeping comfortably in bed   HEENT: MMM  Cardio: RRR, clear s1/s2, no murmur  Resp:  Equal bilat, clear to auscultation  GI/: Soft, non-distended, no TTP, normal bowel sounds, no guarding/rebound  Neuro: Non-focal, Gross intact, no deficits  Skin/Extremities: Cap refill <3sec, warm/well perfused, no rash, normal extremities    Labs/X-ray:      Recent Results (from the past 24 hours)   Ketones - Urine Qual (Acetone Urine Qual)    Collection Time: 25 10:50 AM   Result Value Ref Range    Ketones Large (A)  Negative   POCT glucose device results    Collection Time: 02/23/25 12:34 PM   Result Value Ref Range    POC Glucose, Blood 178 (H) 40 - 99 mg/dL   Ketones - Urine Qual (Acetone Urine Qual)    Collection Time: 02/23/25  1:00 PM   Result Value Ref Range    Ketones Large (A) Negative   Basic Metabolic Panel    Collection Time: 02/23/25  3:11 PM   Result Value Ref Range    Sodium 134 (L) 135 - 145 mmol/L    Potassium 4.3 3.6 - 5.5 mmol/L    Chloride 107 96 - 112 mmol/L    Co2 16 (L) 20 - 33 mmol/L    Glucose 187 (H) 40 - 99 mg/dL    Bun 9 8 - 22 mg/dL    Creatinine 0.23 0.20 - 1.00 mg/dL    Calcium 8.9 8.5 - 10.5 mg/dL    Anion Gap 11.0 7.0 - 16.0   BETA-HYDROXYBUTYRIC ACID    Collection Time: 02/23/25  3:11 PM   Result Value Ref Range    beta-Hydroxybutyric Acid 1.43 (H) 0.02 - 0.27 mmol/L   Ketones - Urine Qual (Acetone Urine Qual)    Collection Time: 02/23/25  8:23 PM   Result Value Ref Range    Ketones Moderate (A) Negative   Ketones - Urine Qual (Acetone Urine Qual)    Collection Time: 02/24/25  5:13 AM   Result Value Ref Range    Ketones Moderate (A) Negative       No orders to display       Medications:  Current Facility-Administered Medications   Medication Dose    0.9 % NaCl with KCl 20 mEq infusion      ibuprofen (Motrin) oral suspension (PEDS) 160 mg  10 mg/kg    normal saline PF 2 mL  2 mL    lidocaine-prilocaine (Emla) 2.5-2.5 % cream      ondansetron (Zofran) syringe/vial injection 1.6 mg  0.1 mg/kg    insulin glargine (Lantus) injection PEN  6 Units    0.9 % NaCl with KCl 20 mEq infusion      dextrose 50% (D50W) injection 7.55 g  0.5 g/kg    insulin lispro (HumaLOG Waylon) injection KWIKPEN  0-10 Units    And    insulin lispro (HumaLOG Waylon) injection KWIKPEN  0-10 Units    And    insulin lispro (HumaLOG Waylon) injection KWIKPEN  0-10 Units    acetaminophen (Tylenol) oral suspension (PEDS) 240 mg  15 mg/kg       ASSESSMENT/PLAN:     Principal Problem:    DKA, type 1, not at goal (HCC)  Active  Problems:    Post-op pain      5 y.o. male admitted to the PICU for DKA in the post-op period transferred to the Peds floor 9/22 for further management.      # Diabetes in the post-op period   # Hx of Type 1 DM - Hgb A1c 8.5  - Patient persistently having moderate urine ketones, serum ketones 2/23 1.43  - Continue home Lantus and sliding scale               - Lantus 6U  - ICR 0.5U:15g; CF 0.5U for 100 > 180  - 0.5 unit for every 15 g CHO with daytime snacks except for bedtime snack  - Hypoglycemic protocol  - Ketone protocol check for BG >250 x 2 consecutive times     # Post op T and A  - Alternating PO Tylenol and Motrin 1 being given every 3-4 hours.              - More of a scheduled regimen in the daytime and prn at night time         # FEN/GI  - UOP 3 ml/kg/d  - Able to wean down fluids depending on ketone level  - Encourage p.o. intake to help offset ketone production from decreased PO   -Try pediasure, yogurts, any soft foods he is craving. No rough foods at this time to prevent T&A wound opening.      Dispo: Inpatient for blood sugar and ketone management. Father aware of the plan and is agreeable.      Celine Appiah DO  PGY-1 Pediatrics Intern   [facility]    As this patient's attending physician, I provided on-site coordination of the healthcare team inclusive of the resident physician which included patient assessment, directing the patient's plan of care, and making decisions regarding the patient's management on this visit's date of service as reflected in the documentation above.

## 2025-04-07 ENCOUNTER — TELEPHONE (OUTPATIENT)
Dept: ONCOLOGY | Facility: CLINIC | Age: 70
End: 2025-04-07

## 2025-04-07 NOTE — TELEPHONE ENCOUNTER
"Caller: Cayetano Curry \"OCTAVIO\"    Relationship to patient: Self    Best call back number: [phone]    Chief complaint: RESCHEDULE     Type of visit: LAB AND FOLLOW UP    Requested date: AFTER WEDNESDAY     If rescheduling, when is the original appointment: 3-27     Additional notes:PLEASE ADVISE          "

## 2025-04-22 ENCOUNTER — LAB (OUTPATIENT)
Dept: LAB | Facility: HOSPITAL | Age: 70
End: 2025-04-22
Payer: MEDICARE

## 2025-04-22 ENCOUNTER — TELEPHONE (OUTPATIENT)
Dept: ONCOLOGY | Facility: CLINIC | Age: 70
End: 2025-04-22
Payer: MEDICARE

## 2025-04-22 DIAGNOSIS — C92.10 CML (CHRONIC MYELOCYTIC LEUKEMIA): ICD-10-CM

## 2025-04-22 LAB
ALBUMIN SERPL-MCNC: 4.3 G/DL (ref 3.5–5.2)
ALBUMIN/GLOB SERPL: 1.8 G/DL
ALP SERPL-CCNC: 62 U/L (ref 39–117)
ALT SERPL W P-5'-P-CCNC: 23 U/L (ref 1–41)
ANION GAP SERPL CALCULATED.3IONS-SCNC: 8 MMOL/L (ref 5–15)
AST SERPL-CCNC: 35 U/L (ref 1–40)
BASOPHILS # BLD AUTO: 0.06 10*3/MM3 (ref 0–0.2)
BASOPHILS NFR BLD AUTO: 1.1 % (ref 0–1.5)
BILIRUB SERPL-MCNC: 1 MG/DL (ref 0–1.2)
BUN SERPL-MCNC: 10 MG/DL (ref 8–23)
BUN/CREAT SERPL: 11 (ref 7–25)
CALCIUM SPEC-SCNC: 9.4 MG/DL (ref 8.6–10.5)
CHLORIDE SERPL-SCNC: 102 MMOL/L (ref 98–107)
CO2 SERPL-SCNC: 30 MMOL/L (ref 22–29)
CREAT SERPL-MCNC: 0.91 MG/DL (ref 0.76–1.27)
DEPRECATED RDW RBC AUTO: 47.6 FL (ref 37–54)
EGFRCR SERPLBLD CKD-EPI 2021: 91.8 ML/MIN/1.73
EOSINOPHIL # BLD AUTO: 0.2 10*3/MM3 (ref 0–0.4)
EOSINOPHIL NFR BLD AUTO: 3.5 % (ref 0.3–6.2)
ERYTHROCYTE [DISTWIDTH] IN BLOOD BY AUTOMATED COUNT: 13.8 % (ref 12.3–15.4)
FERRITIN SERPL-MCNC: 94.53 NG/ML (ref 30–400)
GLOBULIN UR ELPH-MCNC: 2.4 GM/DL
GLUCOSE SERPL-MCNC: 110 MG/DL (ref 65–99)
HCT VFR BLD AUTO: 43.7 % (ref 37.5–51)
HGB BLD-MCNC: 14.7 G/DL (ref 13–17.7)
IMM GRANULOCYTES # BLD AUTO: 0.01 10*3/MM3 (ref 0–0.05)
IMM GRANULOCYTES NFR BLD AUTO: 0.2 % (ref 0–0.5)
IRON 24H UR-MRATE: 182 MCG/DL (ref 59–158)
IRON SATN MFR SERPL: 45 % (ref 20–50)
LYMPHOCYTES # BLD AUTO: 1.26 10*3/MM3 (ref 0.7–3.1)
LYMPHOCYTES NFR BLD AUTO: 22.1 % (ref 19.6–45.3)
MCH RBC QN AUTO: 32 PG (ref 26.6–33)
MCHC RBC AUTO-ENTMCNC: 33.6 G/DL (ref 31.5–35.7)
MCV RBC AUTO: 95.2 FL (ref 79–97)
MONOCYTES # BLD AUTO: 0.67 10*3/MM3 (ref 0.1–0.9)
MONOCYTES NFR BLD AUTO: 11.8 % (ref 5–12)
NEUTROPHILS NFR BLD AUTO: 3.49 10*3/MM3 (ref 1.7–7)
NEUTROPHILS NFR BLD AUTO: 61.3 % (ref 42.7–76)
NRBC BLD AUTO-RTO: 0 /100 WBC (ref 0–0.2)
PHOSPHATE SERPL-MCNC: 1.8 MG/DL (ref 2.5–4.5)
PLATELET # BLD AUTO: 238 10*3/MM3 (ref 140–450)
PMV BLD AUTO: 9.2 FL (ref 6–12)
POTASSIUM SERPL-SCNC: 4.2 MMOL/L (ref 3.5–5.2)
PROT SERPL-MCNC: 6.7 G/DL (ref 6–8.5)
RBC # BLD AUTO: 4.59 10*6/MM3 (ref 4.14–5.8)
SODIUM SERPL-SCNC: 140 MMOL/L (ref 136–145)
TIBC SERPL-MCNC: 402 MCG/DL (ref 298–536)
TRANSFERRIN SERPL-MCNC: 270 MG/DL (ref 200–360)
WBC NRBC COR # BLD AUTO: 5.69 10*3/MM3 (ref 3.4–10.8)

## 2025-04-22 PROCEDURE — 80053 COMPREHEN METABOLIC PANEL: CPT

## 2025-04-22 PROCEDURE — 85025 COMPLETE CBC W/AUTO DIFF WBC: CPT

## 2025-04-22 PROCEDURE — 84100 ASSAY OF PHOSPHORUS: CPT

## 2025-04-22 PROCEDURE — 82728 ASSAY OF FERRITIN: CPT

## 2025-04-22 PROCEDURE — 84466 ASSAY OF TRANSFERRIN: CPT

## 2025-04-22 PROCEDURE — 83540 ASSAY OF IRON: CPT

## 2025-04-22 PROCEDURE — 36415 COLL VENOUS BLD VENIPUNCTURE: CPT

## 2025-04-22 RX ORDER — SODIUM PHOSPHATE, DIBASIC, ANHYDROUS, POTASSIUM PHOSPHATE, MONOBASIC, AND SODIUM PHOSPHATE, MONOBASIC, MONOHYDRATE 852; 155; 130 MG/1; MG/1; MG/1
1 TABLET, COATED ORAL 3 TIMES DAILY
Qty: 9 EACH | Refills: 0 | Status: SHIPPED | OUTPATIENT
Start: 2025-04-22 | End: 2025-04-25

## 2025-04-22 NOTE — TELEPHONE ENCOUNTER
Kphos 3 xs daily for 3 days and repeat phosphorus on Friday. I called to notify Mr Curry and was directed to voicemail. Left a message with our office main number for call back to let him know these things.
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
## 2025-04-27 LAB
LAB DIRECTOR NAME PROVIDER: NORMAL
LABORATORY COMMENT REPORT: NORMAL
OBSERVATION IMP: NEGATIVE
REF LAB TEST METHOD: NORMAL
T(ABL1,BCR)B2A2/CONTROL BLD/T: NORMAL %
T(ABL1,BCR)B3A2/CONTROL BLD/T: NORMAL %
T(ABL1,BCR)E1A2/CONTROL BLD/T: NORMAL %
T(ABL1,BCR)E1A2/CONTROL BLD/T: NORMAL %

## 2025-04-30 ENCOUNTER — OFFICE VISIT (OUTPATIENT)
Dept: ONCOLOGY | Facility: CLINIC | Age: 70
End: 2025-04-30
Payer: MEDICARE

## 2025-04-30 VITALS
HEIGHT: 63 IN | DIASTOLIC BLOOD PRESSURE: 74 MMHG | OXYGEN SATURATION: 98 % | HEART RATE: 66 BPM | WEIGHT: 140.5 LBS | RESPIRATION RATE: 18 BRPM | TEMPERATURE: 97.4 F | BODY MASS INDEX: 24.89 KG/M2 | SYSTOLIC BLOOD PRESSURE: 128 MMHG

## 2025-04-30 DIAGNOSIS — C92.10 CML (CHRONIC MYELOCYTIC LEUKEMIA): Primary | ICD-10-CM

## 2025-04-30 RX ORDER — SODIUM PHOSPHATE, DIBASIC, ANHYDROUS, POTASSIUM PHOSPHATE, MONOBASIC, AND SODIUM PHOSPHATE, MONOBASIC, MONOHYDRATE 852; 155; 130 MG/1; MG/1; MG/1
1 TABLET, COATED ORAL 2 TIMES DAILY
Qty: 60 EACH | Refills: 0 | Status: SHIPPED | OUTPATIENT
Start: 2025-04-30

## 2025-04-30 RX ORDER — FERROUS SULFATE 325(65) MG
325 TABLET ORAL
Qty: 30 TABLET | Refills: 6 | Status: SHIPPED | OUTPATIENT
Start: 2025-04-30

## 2025-05-16 ENCOUNTER — TELEPHONE (OUTPATIENT)
Dept: ONCOLOGY | Facility: CLINIC | Age: 70
End: 2025-05-16
Payer: MEDICARE

## 2025-05-16 DIAGNOSIS — C92.10 CML (CHRONIC MYELOCYTIC LEUKEMIA): Primary | ICD-10-CM

## 2025-05-16 RX ORDER — IMATINIB MESYLATE 100 MG/1
300 TABLET, FILM COATED ORAL DAILY
Qty: 90 TABLET | Refills: 11 | Status: SHIPPED | OUTPATIENT
Start: 2025-05-16

## 2025-05-16 NOTE — TELEPHONE ENCOUNTER
Refill sent to pharmacy    Pharmacy    [facility] - [place], [place] - [address] [facility] [address] [facility] - [phone]  - [phone]  [address] [facility] [address]  Phone: [phone]  Fax: [phone]   E-Prescribing Status    Outpatient Medication Detail      imatinib (GLEEVEC) 100 MG chemo tablet        Sig: Take 3 tablets by mouth Daily.        Sent to pharmacy as: Imatinib Mesylate 100 MG Oral Tablet (GLEEVEC)        Class: Normal        Notes to Pharmacy: Email: [email or web address], To: [facility]: NCPDP [id number]. For questions, please call [phone] for office pharmacist.        Route: Oral        E-Prescribing Status: Receipt confirmed by pharmacy (5/16/2025  4:19 PM CDT)            Micah Fox, PharmD, BCPS  05/16/25  16:21 CDT

## 2025-05-16 NOTE — TELEPHONE ENCOUNTER
"    Caller: Cayetano Curry \"OCTAVIO\"    Relationship: Self    Best call back number:   Telephone Information:   Mobile [phone]         Requested Prescriptions:     GLEEVEC 100MG CHEMO TABLET       Pharmacy where request should be sent:      [facility]     WAS NOT SURE ON ADDRESS         Last office visit with prescribing clinician: 4/30/2025   Last telemedicine visit with prescribing clinician: Visit date not found   Next office visit with prescribing clinician: 10/30/2025     Additional details provided by patient:     HAS ABOUT 3 DAYS LEFT , HAS RUN OUT OF REFILLS    Does the patient have less than a 3 day supply:  [] Yes  [x] No    Would you like a call back once the refill request has been completed: [] Yes [x] No    If the office needs to give you a call back, can they leave a voicemail: [] Yes [x] No          "

## 2025-08-12 ENCOUNTER — TRANSCRIBE ORDERS (OUTPATIENT)
Dept: ADMINISTRATIVE | Facility: HOSPITAL | Age: 70
End: 2025-08-12
Payer: MEDICARE

## 2025-08-12 DIAGNOSIS — M89.8X8 OTHER SPECIFIED DISORDERS OF BONE, OTHER SITE: Primary | ICD-10-CM

## (undated) DEVICE — CABLE SURG L750MM DIA1MM S STL SMOOTH W/ CRMP
Type: IMPLANTABLE DEVICE | Site: HIP | Status: NON-FUNCTIONAL
Removed: 2023-05-07

## (undated) DEVICE — SOLUTION IV 250ML 0.9% SOD CHL PH 5 INJ USP VIAFLX PLAS

## (undated) DEVICE — SUTURE VCRL SZ 2-0 L36IN ABSRB UD L36MM CT-1 1/2 CIR J945H

## (undated) DEVICE — GLOVE SURG SZ 75 L12IN FNGR THK87MIL DK GRN LTX FREE ISOLEX

## (undated) DEVICE — DRAPE,ORTHOMAX ,HIP,W/POUCHES: Brand: MEDLINE

## (undated) DEVICE — YANKAUER,BULB TIP WITH VENT: Brand: ARGYLE

## (undated) DEVICE — LARGE BONE HALL, BLADE, OSCILLATING, 12.5 X 73.8 X 0.63, 1.06 MM: Brand: HALL

## (undated) DEVICE — TUBE ET 7.5MM NSL ORAL BASIC CUF INTMED MURPHY EYE RADPQ

## (undated) DEVICE — TOOL 21BA90 LEGEND 21CM 9MM BA: Brand: MIDAS REX

## (undated) DEVICE — COVER POS PERINL POST NS 082501

## (undated) DEVICE — SYSTEM SKIN CLSR 22CM DERMBND PRINEO

## (undated) DEVICE — THE CHANNEL CLEANING BRUSH IS A NYLON FLEXI BRUSH ATTACHED TO A FLEXIBLE PLASTIC SHEATH DESIGNED TO SAFELY REMOVE DEBRIS FROM FLEXIBLE ENDOSCOPES.

## (undated) DEVICE — MASK,OXYGEN,MED CONC,ADLT,7' TUB, UC: Brand: PENDING

## (undated) DEVICE — GLOVE SURG SZ 85 CRM LTX FREE POLYISOPRENE POLYMER BEAD ANTI

## (undated) DEVICE — CABLE SURG DIA1.7MM S STL HA CERCLAGE W/ CRMP 29880101S] DEPUY SYNTHES USA]
Type: IMPLANTABLE DEVICE | Site: HIP | Status: NON-FUNCTIONAL
Removed: 2023-05-07

## (undated) DEVICE — Z DISCONTINUED USE 2429233 DRESSING FOAM W10XL10CM 5 LAYR SELF ADH VERSATILE SAFETAC

## (undated) DEVICE — ENDOGATOR AUXILIARY WATER JET CONNECTOR: Brand: ENDOGATOR

## (undated) DEVICE — WRAP AROUND LENS-STERLIE

## (undated) DEVICE — LARYNGOSCOPE BLDE MAC HNDL M SZ 35 ST CURAPLEX CURAVIEW LED

## (undated) DEVICE — Z INACTIVE USE 2660664 SOLUTION IRRIG 3000ML 0.9% SOD CHL USP UROMATIC PLAS CONT

## (undated) DEVICE — BIT DRL L145MM DIA3.2MM QUIK CPL W/O STP REUSE

## (undated) DEVICE — CHLORAPREP 26ML ORANGE

## (undated) DEVICE — Device: Brand: DEFENDO AIR/WATER/SUCTION AND BIOPSY VALVE

## (undated) DEVICE — SHEET,DRAPE,53X77,STERILE: Brand: MEDLINE

## (undated) DEVICE — SENSR O2 OXIMAX FNGR A/ 18IN NONSTR

## (undated) DEVICE — JP 3-SPRING RES W/10FR PVC DRAIN/TR: Brand: CARDINAL HEALTH

## (undated) DEVICE — SURGICAL PROCEDURE PACK KNEE TOT DBD CDS LOURDES HOSP LF

## (undated) DEVICE — SUTURE VCRL SZ 1 L18IN ABSRB UD L36MM CT-1 1/2 CIR J841D

## (undated) DEVICE — 3M™ STERI-DRAPE™ INSTRUMENT POUCH 1018: Brand: STERI-DRAPE™

## (undated) DEVICE — SUTURE VCRL SZ 0 L27IN ABSRB UD L36MM CT-1 1/2 CIR J260H

## (undated) DEVICE — GUIDEWIRE ORTH L230MM DIA2.5MM S STL SPADE PNT TIP

## (undated) DEVICE — 6617 IOBAN II PATIENT ISOLATION DRAPE 5/BX,4BX/CS: Brand: STERI-DRAPE™ IOBAN™ 2

## (undated) DEVICE — SOLUTION IV IRRIG POUR BRL 0.9% SODIUM CHL 2F7124

## (undated) DEVICE — SUTURE ABSRB BRAID COAT UD CP NO 2 27IN VCRL J195H

## (undated) DEVICE — SURGICAL PROCEDURE PACK LOWER EXTREMITY LOURDES HOSP

## (undated) DEVICE — BIPOLAR SEALER 23-112-1 AQM 6.0: Brand: AQUAMANTYS ®

## (undated) DEVICE — THREE QUARTER SHEET: Brand: CONVERTORS

## (undated) DEVICE — C-ARMOR C-ARM EQUIPMENT COVERS CLEAR STERILE UNIVERSAL FIT 12 PER CASE: Brand: C-ARMOR

## (undated) DEVICE — ACCY PA100-A LEGEND LUB/DIFFUSER 4 PACK: Brand: MIDAS REX®

## (undated) DEVICE — FRCP BX RADJAW4 NDL 2.8 240 STD OG

## (undated) DEVICE — CONTAINER SPECIMEN BLISTER ST

## (undated) DEVICE — CUFF,BP,DISP,1 TUBE,ADULT,HP: Brand: MEDLINE

## (undated) DEVICE — ZIMMER® STERILE DISPOSABLE TOURNIQUET CUFF WITH PLC, DUAL PORT, SINGLE BLADDER, 34 IN. (86 CM)

## (undated) DEVICE — SHIELD SURG COAX MULT TIP ORIFICE INTERPULSE

## (undated) DEVICE — GLOVE SURG SZ 85 L12IN FNGR THK79MIL GRN LTX FREE

## (undated) DEVICE — TOWEL,OR,DSP,ST,BLUE,DLX,4/PK,20PK/CS: Brand: MEDLINE

## (undated) DEVICE — 3M™ IOBAN™ 2 ANTIMICROBIAL INCISE DRAPE 6651EZ: Brand: IOBAN™ 2

## (undated) DEVICE — BANDAGE COMPR W3INXL15FT BGE E SGL LAYERED CLP CLSR

## (undated) DEVICE — 3 ML SYRINGE WITH HYPODERMIC SAFETY NEEDLE: Brand: MAGELLAN

## (undated) DEVICE — C-ARM: Brand: UNBRANDED

## (undated) DEVICE — FAN SPRAY KIT: Brand: PULSAVAC®

## (undated) DEVICE — TBG SMPL FLTR LINE NASL 02/C02 A/ BX/100

## (undated) DEVICE — GLOVE SURG SZ 8 L12IN FNGR THK13MIL BRN LTX SYN POLYMER W

## (undated) DEVICE — IMPLANTABLE DEVICE
Type: IMPLANTABLE DEVICE | Site: HIP | Status: NON-FUNCTIONAL
Removed: 2023-05-07

## (undated) DEVICE — STERILE POLYISOPRENE POWDER-FREE SURGICAL GLOVES: Brand: PROTEXIS

## (undated) DEVICE — CONMED SCOPE SAVER BITE BLOCK, 20X27 MM: Brand: SCOPE SAVER

## (undated) DEVICE — 20 ML SYRINGE LUER-LOCK TIP: Brand: MONOJECT

## (undated) DEVICE — Z DISCONTINUED APPLICATOR SURG PREP 0.35OZ 2% CHG 70% ISO ALC W/ HI LT

## (undated) DEVICE — ARM BOARD PAD: Brand: DEVON

## (undated) DEVICE — SURGICAL PROCEDURE PACK HIP TOT DBD CDS LOURDES HOSP LTX